# Patient Record
Sex: MALE | Race: WHITE | NOT HISPANIC OR LATINO | Employment: PART TIME | URBAN - METROPOLITAN AREA
[De-identification: names, ages, dates, MRNs, and addresses within clinical notes are randomized per-mention and may not be internally consistent; named-entity substitution may affect disease eponyms.]

---

## 2022-12-01 ENCOUNTER — HOSPITAL ENCOUNTER (INPATIENT)
Facility: HOSPITAL | Age: 77
LOS: 10 days | Discharge: HOME HEALTH | DRG: 236 | End: 2022-12-15
Attending: STUDENT IN AN ORGANIZED HEALTH CARE EDUCATION/TRAINING PROGRAM | Admitting: INTERNAL MEDICINE
Payer: MEDICARE

## 2022-12-01 DIAGNOSIS — R60.0 PERIPHERAL EDEMA: ICD-10-CM

## 2022-12-01 DIAGNOSIS — Z45.018: ICD-10-CM

## 2022-12-01 DIAGNOSIS — R07.9 CHEST PAIN: ICD-10-CM

## 2022-12-01 DIAGNOSIS — I25.10 CORONARY ARTERY DISEASE INVOLVING NATIVE CORONARY ARTERY OF NATIVE HEART WITHOUT ANGINA PECTORIS: ICD-10-CM

## 2022-12-01 DIAGNOSIS — I25.10 CORONARY ARTERY DISEASE, UNSPECIFIED VESSEL OR LESION TYPE, UNSPECIFIED WHETHER ANGINA PRESENT, UNSPECIFIED WHETHER NATIVE OR TRANSPLANTED HEART: ICD-10-CM

## 2022-12-01 DIAGNOSIS — I49.9 CARDIAC RHYTHM DISORDER OR DISTURBANCE OR CHANGE: ICD-10-CM

## 2022-12-01 DIAGNOSIS — I46.9 CARDIAC ARREST: ICD-10-CM

## 2022-12-01 DIAGNOSIS — I25.10 CAD (CORONARY ARTERY DISEASE): ICD-10-CM

## 2022-12-01 DIAGNOSIS — I49.9 DYSRHYTHMIA: ICD-10-CM

## 2022-12-01 DIAGNOSIS — I20.0 UNSTABLE ANGINA: Primary | ICD-10-CM

## 2022-12-01 LAB
ALBUMIN SERPL-MCNC: 4 GM/DL (ref 3.4–4.8)
ALBUMIN/GLOB SERPL: 1.6 RATIO (ref 1.1–2)
ALP SERPL-CCNC: 82 UNIT/L (ref 40–150)
ALT SERPL-CCNC: 19 UNIT/L (ref 0–55)
AST SERPL-CCNC: 16 UNIT/L (ref 5–34)
BASOPHILS # BLD AUTO: 0.08 X10(3)/MCL (ref 0–0.2)
BASOPHILS NFR BLD AUTO: 1 %
BILIRUBIN DIRECT+TOT PNL SERPL-MCNC: 0.6 MG/DL
BNP BLD-MCNC: 29.2 PG/ML
BUN SERPL-MCNC: 23.8 MG/DL (ref 8.4–25.7)
CALCIUM SERPL-MCNC: 8.6 MG/DL (ref 8.8–10)
CHLORIDE SERPL-SCNC: 102 MMOL/L (ref 98–107)
CO2 SERPL-SCNC: 27 MMOL/L (ref 23–31)
CREAT SERPL-MCNC: 1.22 MG/DL (ref 0.73–1.18)
EOSINOPHIL # BLD AUTO: 0.19 X10(3)/MCL (ref 0–0.9)
EOSINOPHIL NFR BLD AUTO: 2.4 %
ERYTHROCYTE [DISTWIDTH] IN BLOOD BY AUTOMATED COUNT: 12.5 % (ref 11.5–17)
GFR SERPLBLD CREATININE-BSD FMLA CKD-EPI: >60 MLS/MIN/1.73/M2
GLOBULIN SER-MCNC: 2.5 GM/DL (ref 2.4–3.5)
GLUCOSE SERPL-MCNC: 168 MG/DL (ref 82–115)
HCT VFR BLD AUTO: 41.7 % (ref 42–52)
HGB BLD-MCNC: 14 GM/DL (ref 14–18)
IMM GRANULOCYTES # BLD AUTO: 0.02 X10(3)/MCL (ref 0–0.04)
IMM GRANULOCYTES NFR BLD AUTO: 0.3 %
INR BLD: 1.09 (ref 0–1.3)
LYMPHOCYTES # BLD AUTO: 2.56 X10(3)/MCL (ref 0.6–4.6)
LYMPHOCYTES NFR BLD AUTO: 32 %
MCH RBC QN AUTO: 32.6 PG (ref 27–31)
MCHC RBC AUTO-ENTMCNC: 33.6 MG/DL (ref 33–36)
MCV RBC AUTO: 97.2 FL (ref 80–94)
MONOCYTES # BLD AUTO: 0.97 X10(3)/MCL (ref 0.1–1.3)
MONOCYTES NFR BLD AUTO: 12.1 %
NEUTROPHILS # BLD AUTO: 4.2 X10(3)/MCL (ref 2.1–9.2)
NEUTROPHILS NFR BLD AUTO: 52.2 %
NRBC BLD AUTO-RTO: 0 %
PLATELET # BLD AUTO: 161 X10(3)/MCL (ref 130–400)
PMV BLD AUTO: 9.1 FL (ref 7.4–10.4)
POTASSIUM SERPL-SCNC: 4.3 MMOL/L (ref 3.5–5.1)
PROT SERPL-MCNC: 6.5 GM/DL (ref 5.8–7.6)
PROTHROMBIN TIME: 14 SECONDS (ref 12.5–14.5)
RBC # BLD AUTO: 4.29 X10(6)/MCL (ref 4.7–6.1)
SODIUM SERPL-SCNC: 138 MMOL/L (ref 136–145)
TROPONIN I SERPL-MCNC: 0.04 NG/ML (ref 0–0.04)
WBC # SPEC AUTO: 8 X10(3)/MCL (ref 4.5–11.5)

## 2022-12-01 PROCEDURE — 93010 ELECTROCARDIOGRAM REPORT: CPT | Mod: ,,, | Performed by: STUDENT IN AN ORGANIZED HEALTH CARE EDUCATION/TRAINING PROGRAM

## 2022-12-01 PROCEDURE — 80053 COMPREHEN METABOLIC PANEL: CPT | Performed by: STUDENT IN AN ORGANIZED HEALTH CARE EDUCATION/TRAINING PROGRAM

## 2022-12-01 PROCEDURE — 63600175 PHARM REV CODE 636 W HCPCS: Performed by: STUDENT IN AN ORGANIZED HEALTH CARE EDUCATION/TRAINING PROGRAM

## 2022-12-01 PROCEDURE — 99285 EMERGENCY DEPT VISIT HI MDM: CPT | Mod: 25

## 2022-12-01 PROCEDURE — 93010 EKG 12-LEAD: ICD-10-PCS | Mod: ,,, | Performed by: STUDENT IN AN ORGANIZED HEALTH CARE EDUCATION/TRAINING PROGRAM

## 2022-12-01 PROCEDURE — 96372 THER/PROPH/DIAG INJ SC/IM: CPT | Performed by: STUDENT IN AN ORGANIZED HEALTH CARE EDUCATION/TRAINING PROGRAM

## 2022-12-01 PROCEDURE — 93005 ELECTROCARDIOGRAM TRACING: CPT

## 2022-12-01 PROCEDURE — 84484 ASSAY OF TROPONIN QUANT: CPT | Performed by: STUDENT IN AN ORGANIZED HEALTH CARE EDUCATION/TRAINING PROGRAM

## 2022-12-01 PROCEDURE — 85025 COMPLETE CBC W/AUTO DIFF WBC: CPT | Performed by: STUDENT IN AN ORGANIZED HEALTH CARE EDUCATION/TRAINING PROGRAM

## 2022-12-01 PROCEDURE — 85610 PROTHROMBIN TIME: CPT | Performed by: STUDENT IN AN ORGANIZED HEALTH CARE EDUCATION/TRAINING PROGRAM

## 2022-12-01 PROCEDURE — 83880 ASSAY OF NATRIURETIC PEPTIDE: CPT | Performed by: STUDENT IN AN ORGANIZED HEALTH CARE EDUCATION/TRAINING PROGRAM

## 2022-12-01 PROCEDURE — G0378 HOSPITAL OBSERVATION PER HR: HCPCS

## 2022-12-01 PROCEDURE — 25000003 PHARM REV CODE 250

## 2022-12-01 PROCEDURE — 25000003 PHARM REV CODE 250: Performed by: STUDENT IN AN ORGANIZED HEALTH CARE EDUCATION/TRAINING PROGRAM

## 2022-12-01 RX ORDER — TADALAFIL 5 MG/1
5 TABLET ORAL DAILY
Status: ON HOLD | COMMUNITY
Start: 2022-11-21 | End: 2022-12-15 | Stop reason: HOSPADM

## 2022-12-01 RX ORDER — SODIUM CHLORIDE 0.9 % (FLUSH) 0.9 %
10 SYRINGE (ML) INJECTION
Status: DISCONTINUED | OUTPATIENT
Start: 2022-12-02 | End: 2022-12-15 | Stop reason: HOSPADM

## 2022-12-01 RX ORDER — AMLODIPINE BESYLATE 5 MG/1
5 TABLET ORAL DAILY
Status: ON HOLD | COMMUNITY
Start: 2022-11-17 | End: 2022-12-15 | Stop reason: HOSPADM

## 2022-12-01 RX ORDER — TRAMADOL HYDROCHLORIDE 50 MG/1
50 TABLET ORAL 2 TIMES DAILY PRN
Status: ON HOLD | COMMUNITY
Start: 2022-11-14 | End: 2022-12-15 | Stop reason: HOSPADM

## 2022-12-01 RX ORDER — TRAZODONE HYDROCHLORIDE 100 MG/1
100 TABLET ORAL NIGHTLY
Status: ON HOLD | COMMUNITY
Start: 2022-09-25 | End: 2022-12-15 | Stop reason: HOSPADM

## 2022-12-01 RX ORDER — NAPROXEN SODIUM 220 MG/1
324 TABLET, FILM COATED ORAL
Status: COMPLETED | OUTPATIENT
Start: 2022-12-01 | End: 2022-12-01

## 2022-12-01 RX ORDER — TALC
6 POWDER (GRAM) TOPICAL NIGHTLY PRN
Status: DISCONTINUED | OUTPATIENT
Start: 2022-12-02 | End: 2022-12-15 | Stop reason: HOSPADM

## 2022-12-01 RX ORDER — ENOXAPARIN SODIUM 100 MG/ML
1 INJECTION SUBCUTANEOUS
Status: COMPLETED | OUTPATIENT
Start: 2022-12-01 | End: 2022-12-01

## 2022-12-01 RX ORDER — NAPROXEN SODIUM 220 MG/1
81 TABLET, FILM COATED ORAL DAILY
Status: DISCONTINUED | OUTPATIENT
Start: 2022-12-02 | End: 2022-12-05

## 2022-12-01 RX ORDER — PANTOPRAZOLE SODIUM 40 MG/1
40 TABLET, DELAYED RELEASE ORAL DAILY
Status: ON HOLD | COMMUNITY
Start: 2022-10-29 | End: 2022-12-15 | Stop reason: HOSPADM

## 2022-12-01 RX ADMIN — ASPIRIN 81 MG CHEWABLE TABLET 324 MG: 81 TABLET CHEWABLE at 10:12

## 2022-12-01 RX ADMIN — ENOXAPARIN SODIUM 80 MG: 80 INJECTION SUBCUTANEOUS at 11:12

## 2022-12-01 NOTE — Clinical Note
The catheter was repositioned into the MECHE. An angiography was performed of the Right internal mammary artery. The angiography was performed via power injection.

## 2022-12-01 NOTE — Clinical Note
The catheter was inserted into the ostium   left main. Hemodynamics were performed.  An angiography was performed of the left coronary arteries. Multiple views were taken. The angiography was performed via power injection.  Then removed.

## 2022-12-01 NOTE — Clinical Note
The catheter was repositioned into the LIMA. Hemodynamics were performed.  An angiography was performed of the Left internal mammary artery. The angiography was performed via power injection.  Then removed.

## 2022-12-01 NOTE — Clinical Note
Diagnosis: Unstable angina [[id number]]   Future Attending Provider: FABIAN GUPTA [[id number]]   Admitting Provider:: FABIAN GUPTA [[id number]]

## 2022-12-01 NOTE — Clinical Note
The wire was inserted into the aortaWire used for catheter insertion and all exchanges. Then removed..

## 2022-12-02 PROBLEM — I20.0 UNSTABLE ANGINA: Status: ACTIVE | Noted: 2022-12-02

## 2022-12-02 PROBLEM — R07.9 CHEST PAIN: Status: ACTIVE | Noted: 2022-12-02

## 2022-12-02 LAB
CATH EF QUANTITATIVE: 60 %
INR BLD: 0.99 (ref 0–1.3)
PROTHROMBIN TIME: 13 SECONDS (ref 12.5–14.5)
TROPONIN I SERPL-MCNC: 0.05 NG/ML (ref 0–0.04)
TROPONIN I SERPL-MCNC: 0.07 NG/ML (ref 0–0.04)

## 2022-12-02 PROCEDURE — 25500020 PHARM REV CODE 255: Performed by: INTERNAL MEDICINE

## 2022-12-02 PROCEDURE — 93010 EKG 12-LEAD: ICD-10-PCS | Mod: 76,,, | Performed by: STUDENT IN AN ORGANIZED HEALTH CARE EDUCATION/TRAINING PROGRAM

## 2022-12-02 PROCEDURE — 93005 ELECTROCARDIOGRAM TRACING: CPT

## 2022-12-02 PROCEDURE — 84484 ASSAY OF TROPONIN QUANT: CPT | Performed by: STUDENT IN AN ORGANIZED HEALTH CARE EDUCATION/TRAINING PROGRAM

## 2022-12-02 PROCEDURE — C1760 CLOSURE DEV, VASC: HCPCS | Performed by: INTERNAL MEDICINE

## 2022-12-02 PROCEDURE — 36415 COLL VENOUS BLD VENIPUNCTURE: CPT | Performed by: INTERNAL MEDICINE

## 2022-12-02 PROCEDURE — 99153 MOD SED SAME PHYS/QHP EA: CPT | Performed by: INTERNAL MEDICINE

## 2022-12-02 PROCEDURE — 99152 MOD SED SAME PHYS/QHP 5/>YRS: CPT | Performed by: INTERNAL MEDICINE

## 2022-12-02 PROCEDURE — G0378 HOSPITAL OBSERVATION PER HR: HCPCS

## 2022-12-02 PROCEDURE — 25000003 PHARM REV CODE 250: Performed by: INTERNAL MEDICINE

## 2022-12-02 PROCEDURE — C1769 GUIDE WIRE: HCPCS | Performed by: INTERNAL MEDICINE

## 2022-12-02 PROCEDURE — 25000003 PHARM REV CODE 250: Performed by: STUDENT IN AN ORGANIZED HEALTH CARE EDUCATION/TRAINING PROGRAM

## 2022-12-02 PROCEDURE — 93010 ELECTROCARDIOGRAM REPORT: CPT | Mod: 59,,, | Performed by: STUDENT IN AN ORGANIZED HEALTH CARE EDUCATION/TRAINING PROGRAM

## 2022-12-02 PROCEDURE — 93459 L HRT ART/GRFT ANGIO: CPT | Performed by: INTERNAL MEDICINE

## 2022-12-02 PROCEDURE — 25000242 PHARM REV CODE 250 ALT 637 W/ HCPCS: Performed by: INTERNAL MEDICINE

## 2022-12-02 PROCEDURE — 63600175 PHARM REV CODE 636 W HCPCS: Performed by: INTERNAL MEDICINE

## 2022-12-02 PROCEDURE — 85610 PROTHROMBIN TIME: CPT | Performed by: INTERNAL MEDICINE

## 2022-12-02 PROCEDURE — C1894 INTRO/SHEATH, NON-LASER: HCPCS | Performed by: INTERNAL MEDICINE

## 2022-12-02 PROCEDURE — 93010 ELECTROCARDIOGRAM REPORT: CPT | Mod: 76,,, | Performed by: STUDENT IN AN ORGANIZED HEALTH CARE EDUCATION/TRAINING PROGRAM

## 2022-12-02 DEVICE — ANGIO-SEAL VIP VASCULAR CLOSURE DEVICE
Type: IMPLANTABLE DEVICE | Site: GROIN | Status: FUNCTIONAL
Brand: ANGIO-SEAL

## 2022-12-02 RX ORDER — SODIUM CHLORIDE 9 MG/ML
500 INJECTION, SOLUTION INTRAVENOUS
Status: COMPLETED | OUTPATIENT
Start: 2022-12-02 | End: 2022-12-02

## 2022-12-02 RX ORDER — FENTANYL CITRATE 50 UG/ML
INJECTION, SOLUTION INTRAMUSCULAR; INTRAVENOUS
Status: DISCONTINUED | OUTPATIENT
Start: 2022-12-02 | End: 2022-12-05

## 2022-12-02 RX ORDER — NITROGLYCERIN 0.4 MG/1
0.4 TABLET SUBLINGUAL
Status: COMPLETED | OUTPATIENT
Start: 2022-12-02 | End: 2022-12-02

## 2022-12-02 RX ORDER — LIDOCAINE HYDROCHLORIDE 10 MG/ML
INJECTION INFILTRATION; PERINEURAL
Status: DISCONTINUED | OUTPATIENT
Start: 2022-12-02 | End: 2022-12-05

## 2022-12-02 RX ORDER — MIDAZOLAM HYDROCHLORIDE 1 MG/ML
INJECTION INTRAMUSCULAR; INTRAVENOUS
Status: DISCONTINUED | OUTPATIENT
Start: 2022-12-02 | End: 2022-12-05

## 2022-12-02 RX ORDER — SODIUM CHLORIDE 9 MG/ML
INJECTION, SOLUTION INTRAVENOUS CONTINUOUS
Status: ACTIVE | OUTPATIENT
Start: 2022-12-02 | End: 2022-12-03

## 2022-12-02 RX ORDER — LOSARTAN POTASSIUM 25 MG/1
25 TABLET ORAL DAILY
Status: DISCONTINUED | OUTPATIENT
Start: 2022-12-03 | End: 2022-12-15 | Stop reason: HOSPADM

## 2022-12-02 RX ORDER — ATORVASTATIN CALCIUM 40 MG/1
80 TABLET, FILM COATED ORAL NIGHTLY
Status: DISCONTINUED | OUTPATIENT
Start: 2022-12-02 | End: 2022-12-15 | Stop reason: HOSPADM

## 2022-12-02 RX ORDER — HEPARIN SODIUM,PORCINE/D5W 25000/250
20 INTRAVENOUS SOLUTION INTRAVENOUS CONTINUOUS
Status: DISCONTINUED | OUTPATIENT
Start: 2022-12-03 | End: 2022-12-05

## 2022-12-02 RX ORDER — METOPROLOL TARTRATE 25 MG/1
25 TABLET, FILM COATED ORAL 2 TIMES DAILY
Status: DISCONTINUED | OUTPATIENT
Start: 2022-12-02 | End: 2022-12-05

## 2022-12-02 RX ORDER — NITROGLYCERIN 0.4 MG/1
TABLET SUBLINGUAL
Status: DISCONTINUED | OUTPATIENT
Start: 2022-12-02 | End: 2022-12-15 | Stop reason: HOSPADM

## 2022-12-02 RX ADMIN — SODIUM CHLORIDE 500 ML: 9 INJECTION, SOLUTION INTRAVENOUS at 04:12

## 2022-12-02 RX ADMIN — ATORVASTATIN CALCIUM 80 MG: 40 TABLET, FILM COATED ORAL at 09:12

## 2022-12-02 RX ADMIN — METOPROLOL TARTRATE 25 MG: 25 TABLET, FILM COATED ORAL at 09:12

## 2022-12-02 RX ADMIN — SODIUM CHLORIDE: 9 INJECTION, SOLUTION INTRAVENOUS at 07:12

## 2022-12-02 RX ADMIN — NITROGLYCERIN 0.4 MG: 0.4 TABLET, ORALLY DISINTEGRATING SUBLINGUAL at 04:12

## 2022-12-02 RX ADMIN — ASPIRIN 81 MG CHEWABLE TABLET 81 MG: 81 TABLET CHEWABLE at 08:12

## 2022-12-02 NOTE — ED PROVIDER NOTES
Encounter Date: 12/1/2022    SCRIBE #1 NOTE: I, Melvin Mayer, am scribing for, and in the presence of,  Dr. Dale Singh MD. I have scribed the entire note.     History     Chief Complaint   Patient presents with    Chest Pain     Pt was seen [facility] yesterday for CP intial troponin was 52. 1 hour troponin 71. 3 hour troponin was 65.  Pt was sent home with a f/u for cardiology. Dr. Martinez wanted to perform an angio asap and needed to pt admitted to do so. Pt arrives POV states CP radiates down left arm.      77 year old male presents to the ED for chest pain. Pt states over the past 7 days he has experienced mild chest pressure that radiates down his left arm. Pt states he has also been feeling SOB upon exertion, but improves when resting. Pt was seen at [facility] yesterday and was told his Troponin was elevated. Pt was told to find a cardiologist. Dr. Martinez told the pt to come to the ED for admission so he can have an angiogram done in the morning. Pt denies any current pain or symptoms.     The history is provided by the patient. No  was used.   Chest Pain  The current episode started several days ago. Chest pain occurs intermittently. The chest pain is unchanged. The pain is associated with exertion. The quality of the pain is described as pressure-like. The pain radiates to the left arm. Primary symptoms include shortness of breath. Pertinent negatives for primary symptoms include no fever, no fatigue, no cough, no abdominal pain, no nausea, no vomiting and no dizziness.   Pertinent negatives for associated symptoms include no numbness and no weakness. He tried nothing for the symptoms. Risk factors include being elderly.   Review of patient's allergies indicates:   Allergen Reactions    Diphenhydramine      No past medical history on file.  Past Surgical History:   Procedure Laterality Date    ANGIOGRAM, CORONARY, WITH LEFT HEART CATHETERIZATION N/A 12/2/2022    Procedure: Angiogram, Coronary, with Left  Heart Cath;  Surgeon: Candido Martinez MD;  Location: [facility] CATH LAB;  Service: Cardiology;  Laterality: N/A;    CORONARY ARTERY BYPASS GRAFT (CABG) N/A 12/5/2022    Procedure: CORONARY ARTERY BYPASS GRAFT (CABG);  Surgeon: Vini Ricks MD;  Location: [facility] OR;  Service: Cardiothoracic;  Laterality: N/A;  Case #3    INSERTION OF PACEMAKER N/A 12/8/2022    Procedure: INSERTION, CARDIAC PACEMAKER;  Surgeon: Vini Ricks MD;  Location: [facility] OR;  Service: Cardiology;  Laterality: N/A;  INSERTION OF PERM PACEMAKER //    REP - PAT     No family history on file.     Review of Systems   Constitutional:  Negative for chills, fatigue and fever.   HENT:  Negative for congestion, ear discharge, ear pain, nosebleeds, rhinorrhea and sore throat.    Eyes:  Negative for pain, redness and visual disturbance.   Respiratory:  Positive for shortness of breath. Negative for cough and chest tightness.    Cardiovascular:  Positive for chest pain. Negative for leg swelling.   Gastrointestinal:  Negative for abdominal pain, blood in stool, constipation, diarrhea, nausea and vomiting.   Genitourinary:  Negative for dysuria and hematuria.   Musculoskeletal:  Negative for arthralgias, joint swelling, myalgias and neck pain.   Skin:  Negative for color change, pallor and wound.   Neurological:  Negative for dizziness, weakness, light-headedness, numbness and headaches.     Physical Exam     Initial Vitals [12/01/22 2126]   BP Pulse Resp Temp SpO2   130/74 76 16 96.1 °F (35.6 °C) 98 %      MAP       --         Physical Exam    Nursing note and vitals reviewed.  Constitutional: He appears well-developed and well-nourished. He is not diaphoretic. No distress.   HENT:   Head: Normocephalic and atraumatic.   Right Ear: External ear normal.   Left Ear: External ear normal.   Nose: Nose normal.   Mouth/Throat: Oropharynx is clear and moist.   Eyes: Conjunctivae and EOM are normal. Pupils are equal, round, and reactive to light. Right eye  exhibits no discharge. Left eye exhibits no discharge.   Neck: Neck supple. No tracheal deviation present.   Normal range of motion.  Cardiovascular:  Normal rate, regular rhythm, normal heart sounds and intact distal pulses.     Exam reveals no gallop and no friction rub.       No murmur heard.  Pulmonary/Chest: Breath sounds normal. No stridor. No respiratory distress. He has no wheezes. He has no rhonchi. He has no rales. He exhibits no tenderness.   Abdominal: Abdomen is soft. Bowel sounds are normal. He exhibits no distension and no mass. There is no abdominal tenderness. There is no guarding.   Musculoskeletal:         General: No tenderness or edema. Normal range of motion.      Cervical back: Normal range of motion and neck supple.     Neurological: He is alert and oriented to person, place, and time. No cranial nerve deficit or sensory deficit.   Skin: Skin is warm and dry. Capillary refill takes less than 2 seconds. No rash noted. No erythema. No pallor.       ED Course   Critical Care    Date/Time: 12/19/2022 4:00 PM  Performed by: Dale Cordoba MD  Authorized by: Dale Cordoba MD   Direct patient critical care time: 10 minutes  Additional history critical care time: 5 minutes  Ordering / reviewing critical care time: 6 minutes  Documentation critical care time: 5 minutes  Consulting other physicians critical care time: 5 minutes  Total critical care time (exclusive of procedural time) : 31 minutes  Critical care time was exclusive of separately billable procedures and treating other patients.  Critical care was necessary to treat or prevent imminent or life-threatening deterioration of the following conditions: cardiac failure.  Critical care was time spent personally by me on the following activities: development of treatment plan with patient or surrogate, discussions with consultants, discussions with primary provider, interpretation of cardiac output measurements, evaluation of patient's  response to treatment, examination of patient, obtaining history from patient or surrogate, ordering and performing treatments and interventions, ordering and review of laboratory studies, ordering and review of radiographic studies, pulse oximetry, re-evaluation of patient's condition and review of old charts.  Comments: Unstable angina requiring Lovenox, admission      Labs Reviewed   COMPREHENSIVE METABOLIC PANEL - Abnormal; Notable for the following components:       Result Value    Glucose Level 168 (*)     Creatinine 1.22 (*)     Calcium Level Total 8.6 (*)     All other components within normal limits   CBC WITH DIFFERENTIAL - Abnormal; Notable for the following components:    RBC 4.29 (*)     Hct 41.7 (*)     MCV 97.2 (*)     MCH 32.6 (*)     All other components within normal limits   TROPONIN I - Abnormal; Notable for the following components:    Troponin-I 0.051 (*)     All other components within normal limits   TROPONIN I - Abnormal; Notable for the following components:    Troponin-I 0.067 (*)     All other components within normal limits   B-TYPE NATRIURETIC PEPTIDE - Normal   TROPONIN I - Normal   PROTIME-INR - Normal   CBC W/ AUTO DIFFERENTIAL    Narrative:     The following orders were created for panel order CBC auto differential.  Procedure                               Abnormality         Status                     ---------                               -----------         ------                     CBC with Differential[[id number]]        Abnormal            Final result                 Please view results for these tests on the individual orders.     EKG Readings: (Independently Interpreted)   Initial Reading: No STEMI. Rhythm: Normal Sinus Rhythm. Heart Rate: 70. Ectopy: No Ectopy. Conduction: Normal. ST Segments: Normal ST Segments. T Waves Flipped: AVL. Axis: Left Axis Deviation.   Done on 12/1/22 at 2126.     QTC: 429 ms    High voltage QRS   ECG Results              EKG 12-lead (Final  result)  Result time 12/03/22 09:28:12      [facility] (12/03/22 09:28:12)                   Narrative:    Test Reason : R07.9,    Vent. Rate : 073 BPM     Atrial Rate : 073 BPM     P-R Int : 238 ms          QRS Dur : 106 ms      QT Int : 408 ms       P-R-T Axes : 050 -39 078 degrees     QTc Int : 449 ms    Sinus rhythm with 1st degree A-V block  Left axis deviation  Incomplete right bundle branch block  Minimal voltage criteria for LVH, may be normal variant ( R in aVL )  Septal infarct ,age undetermined  Abnormal ECG  Confirmed by Jay Mitchell MD ([id number]) on 12/3/2022 9:28:05 AM    Referred By: [email or web address]   SELF           Confirmed By:Jay Mitchell MD                                     EKG 12-lead (Final result)  Result time 12/03/22 09:31:28      Final result by Interface, Lab In [facility] (12/03/22 09:31:28)                   Narrative:    Test Reason : R07.9,    Vent. Rate : 070 BPM     Atrial Rate : 070 BPM     P-R Int : 202 ms          QRS Dur : 104 ms      QT Int : 398 ms       P-R-T Axes : 090 -40 068 degrees     QTc Int : 429 ms    Normal sinus rhythm  Left axis deviation  Minimal voltage criteria for LVH, may be normal variant ( R in aVL )  Abnormal ECG  No previous ECGs available  Confirmed by Jay Mitchell MD ([id number]) on 12/3/2022 9:31:15 AM    Referred By:             Confirmed By:Jay Mitchell MD                                  Imaging Results              X-Ray Chest PA And Lateral (Final result)  Result time 12/02/22 06:49:11      Final result by Jong Salazar MD (12/02/22 06:49:11)                   Impression:      No acute cardiopulmonary process.      Electronically signed by: Jong Salazar  Date:    12/02/2022  Time:    06:49               Narrative:    EXAMINATION:  XR CHEST PA AND LATERAL    CLINICAL HISTORY:  Chest Pain;    TECHNIQUE:  Two views of the chest    COMPARISON:  08/18/2021    FINDINGS:  No focal opacification, pleural effusion, or  pneumothorax.    The cardiomediastinal silhouette is within normal limits.    No acute osseous abnormality.                                       Medications   0.9%  NaCl infusion ( Intravenous New Bag 12/2/22 1915)   cefUROXime sodium 1.5 gram SolR (has no administration in time range)   dextrose 5 % in water (D5W) 5 % IVPB (has no administration in time range)   cefazolin (ANCEF) 2 gram in dextrose 5% 50 mL IVPB (premix) (2 g Intravenous New Bag 12/6/22 0611)   mupirocin 2 % ointment ( Nasal Given 12/9/22 2030)   electrolyte-A infusion (has no administration in time range)   ondansetron 4 mg/2 mL injection (has no administration in time range)   ondansetron 4 mg/2 mL injection (  Back Association 12/6/22 1000)   heparin, porcine (PF) (heparin flush 100 units/mL) 100 unit/mL injection flush (has no administration in time range)   LIDOcaine HCL 10 mg/ml (1%) 10 mg/mL (1 %) injection (has no administration in time range)   amiodarone 360 mg/200 mL (1.8 mg/mL) infusion (1 mg/min Intravenous New Bag 12/10/22 0603)   amiodarone tablet 400 mg (400 mg Oral Given 12/12/22 2131)   aspirin chewable tablet 324 mg (324 mg Oral Given 12/1/22 2206)   enoxaparin injection 80 mg (80 mg Subcutaneous Given 12/1/22 2342)   0.9%  NaCl infusion (0 mLs Intravenous Stopped 12/2/22 0406)   nitroGLYCERIN SL tablet 0.4 mg (0.4 mg Sublingual Given 12/2/22 0404)   heparin 25,000 units in dextrose 5% (100 units/ml) IV bolus from bag INITIAL BOLUS (max bolus 4000 units) (4,000 Units Intravenous Bolus from Bag 12/3/22 0010)   ketorolac injection 15 mg (15 mg Intravenous Given 12/8/22 1140)   acetaminophen 1,000 mg/100 mL (10 mg/mL) injection 1,000 mg (1,000 mg Intravenous New Bag 12/5/22 1615)   morphine 4 mg/mL injection (4 mg  Given 12/5/22 1740)   acetaminophen 1,000 mg/100 mL (10 mg/mL) injection 1,000 mg (0 mg Intravenous Stopped 12/8/22 9671)   amiodarone in dextrose 150 mg/100 mL (1.5 mg/mL) loading dose 150 mg (0 mg Intravenous Stopped  12/10/22 0228)   amiodarone tablet 200 mg (200 mg Oral Given 12/15/22 0802)   furosemide injection 20 mg (20 mg Intravenous Given 12/12/22 1343)     Medical Decision Making:   Initial Assessment:   Patient with chest pain, worse with exertion told by Cardiology to come to the ER for further evaluation  Differential Diagnosis:   CAD, angina, unstable angina, NSTEMI  Clinical Tests:   Lab Tests: Reviewed and Ordered  Radiological Study: Reviewed and Ordered  Medical Tests: Reviewed and Ordered  ED Management:  Patient awake alert oriented.  Chest pain-free at this time.  Unfortunately he is a very good history for some unstable angina, chest pain radiating down his left arm.  Associated with shortness of breath.  Discussed with DANNI Madrigal for [facility], asked that we admit patient give therapeutic Lovenox, will admit for further evaluation the a.m..  NPO at midnight.  Patient's labs largely unremarkable, EKG without any acute ischemic changes.  We will admit patient to cardiology for further evaluation management.    Dispo:  Admit      Data Reviewed/Counseling: I have personally reviewed the patient's vital signs, nursing notes, and other relevant tests, information, and imaging. I had a detailed discussion regarding the historical points, exam findings, and any diagnostic results supporting the discharge diagnosis.      Portions of this note were dictated using voice recognition software. Although it was reviewed for accuracy, some inherent voice recognition errors may have occurred and be present in this document.             Scribe Attestation:   Scribe #1: I performed the above scribed service and the documentation accurately describes the services I performed. I attest to the accuracy of the note.    Attending Attestation:           Physician Attestation for Scribe:  Physician Attestation Statement for Scribe #1: I, Dr. Dale Singh MD, reviewed documentation, as scribed by Melvin Mayer in my presence, and it is  both accurate and complete.                        Clinical Impression:   Final diagnoses:  [R07.9] Chest pain  [I20.0] Unstable angina (Primary)          ED Disposition Condition    Observation Stable                Dale Cordoba MD  12/02/22 0059       Dale Cordoba MD  12/19/22 4457

## 2022-12-02 NOTE — H&P
Patient name: Suresh Aragon  MRN: [id number]  : 1945  Cath Lab Procedure H&P Update    Pre-Procedure Assessment:    I saw and examined the patient face to face. The patient has been re-evaluated and his condition is unchanged. The reason for admission, procedure and care is still present.  Based on the patients H&P, pre-procedure physical exam, relevant diagnostic studies, NPO status and information obtained from the patient, I determine the patient is an appropriate candidate for the proposed procedure and anesthesia planned. I further certify the anesthesia risks, benefits and options have been explained to the patient to which he agrees as documented on the procedural consent.    See scanned updated H&P and additional records from media tab.      Candido Martinez

## 2022-12-02 NOTE — H&P
[facility]  Cardiology  History and Physical     Patient Name: Suresh Aragon  MRN: [id number]  Admission Date: 12/1/2022  Code Status: Full Code   Attending Provider: Yair Benson MD   Primary Care Physician: No primary care provider on file.  Principal Problem:<principal problem not specified>    Patient information was obtained from patient, past medical records, and ER records.     Subjective:     Chief Complaint:  chest pain     HPI:   This is a 77-year-old male, who is known to Dr. Martinez, with a history of HTN, HLD, GERD, angina.  Patient presents to the ER on 12/01/2022 with complaints of chest pain.  Of note patient was just seen in clinic by Dr. Martinez where he was advised to present to ER if had chest pain secondary to unstable angina prior to his scheduled outpatient LHC.  Patient stated the pain started approximately 7 days prior.  He stated the pain feels like a pressure and radiates down to his left arm.  He said the pain is worse with exertion and improves with rest.  He also had associated shortness of breath with exertion.  Initial troponin was negative however repeat troponins are now mildly elevated.  Initial EKG revealed normal sinus rhythm with a left axis deviation and LVH.  [facility] has admitted the patient for unstable angina.      PMH: HTN, HLD, GERD, angina, Prostate cancer  PSH:   appendectomy, prostate surgery  Social History:  Denies EtOH, tobacco, and illicit drug use  Family History:  Father-CAD, MI, HTN    Previous Cardiac Diagnostics:   No previous cardiac diagnostics available for review          Review of patient's allergies indicates:   Allergen Reactions    Diphenhydramine        No current facility-administered medications on file prior to encounter.     Current Outpatient Medications on File Prior to Encounter   Medication Sig    amLODIPine (NORVASC) 5 MG tablet Take 5 mg by mouth once daily.    pantoprazole (PROTONIX) 40 MG tablet Take 40 mg by mouth once  daily.    tadalafiL (CIALIS) 5 MG tablet Take 5 mg by mouth once daily.    traMADoL (ULTRAM) 50 mg tablet Take 50 mg by mouth 2 (two) times daily as needed.    traZODone (DESYREL) 100 MG tablet Take 100 mg by mouth every evening.         Review of Systems   Constitutional: Negative.   HENT: Negative.     Eyes: Negative.    Cardiovascular:  Positive for chest pain.   Respiratory: Negative.     Endocrine: Negative.    Hematologic/Lymphatic: Negative.    Skin: Negative.    Musculoskeletal: Negative.    Gastrointestinal: Negative.    Genitourinary: Negative.    Neurological: Negative.    Psychiatric/Behavioral: Negative.     Allergic/Immunologic: Negative.    Objective:     Vital Signs (Most Recent):  Temp: 96.1 °F (35.6 °C) (12/01/22 2126)  Pulse: 72 (12/02/22 0721)  Resp: 18 (12/02/22 0721)  BP: (!) 135/94 (12/02/22 0701)  SpO2: 98 % (12/02/22 0721)   Vital Signs (24h Range):  Temp:  [96.1 °F (35.6 °C)] 96.1 °F (35.6 °C)  Pulse:  [63-77] 72  Resp:  [13-22] 18  SpO2:  [95 %-98 %] 98 %  BP: (110-156)/(63-94) 135/94     Weight: 78 kg (172 lb)  Body mass index is 24.68 kg/m².    SpO2: 98 %  O2 Device (Oxygen Therapy): room air    No intake or output data in the 24 hours ending 12/02/22 0823    Lines/Drains/Airways       Peripheral Intravenous Line  Duration                  Peripheral IV - Single Lumen 12/01/22 2215 20 G Left Antecubital <1 day                    Physical Exam  Constitutional:       Appearance: Normal appearance.   HENT:      Head: Normocephalic.   Eyes:      Extraocular Movements: Extraocular movements intact.      Conjunctiva/sclera: Conjunctivae normal.   Cardiovascular:      Rate and Rhythm: Normal rate and regular rhythm.      Pulses: Normal pulses.      Heart sounds: Normal heart sounds.   Pulmonary:      Effort: Pulmonary effort is normal.      Breath sounds: Normal breath sounds.   Abdominal:      General: Abdomen is flat.   Musculoskeletal:         General: Normal range of motion.      Cervical  back: Normal range of motion and neck supple.   Skin:     General: Skin is warm and dry.   Neurological:      Mental Status: He is alert and oriented to person, place, and time.       Significant Labs: CMP   Recent Labs   Lab 12/01/22 2203      K 4.3   CO2 27   BUN 23.8   CREATININE 1.22*   CALCIUM 8.6*   ALBUMIN 4.0   BILITOT 0.6   ALKPHOS 82   AST 16   ALT 19   , CBC   Recent Labs   Lab 12/01/22 2203   WBC 8.0   HGB 14.0   HCT 41.7*      , and Troponin   Recent Labs   Lab 12/01/22 2203 12/02/22  0004 12/02/22  0529   TROPONINI 0.045 0.051* 0.067*       Significant Imaging:  X-Ray: CXR: X-Ray Chest PA and Lateral (CXR):   Results for orders placed or performed during the hospital encounter of 12/01/22   X-Ray Chest PA And Lateral    Narrative    EXAMINATION:  XR CHEST PA AND LATERAL    CLINICAL HISTORY:  Chest Pain;    TECHNIQUE:  Two views of the chest    COMPARISON:  08/18/2021    FINDINGS:  No focal opacification, pleural effusion, or pneumothorax.    The cardiomediastinal silhouette is within normal limits.    No acute osseous abnormality.      Impression    No acute cardiopulmonary process.      Electronically signed by: Jong Salazar  Date:    12/02/2022  Time:    06:49     EKG:       Assessment and Plan:     Impression:  Unstable angina  HTN  HLD  GERD    Plan:  Keep NPO  Continue to trend CE  [facility] today  Continue home medications  Risk, Benefits and Alternatives Reviewed and Discussed with the PT and their Family and they wish to proceed with above Procedure.        NIKOLE Cervantes-BC  Cardiology  [facility] - Emergency Dept  12/02/2022 8:23 AM

## 2022-12-02 NOTE — PROGRESS NOTES
76 yo male known to [facility], seen by Dr. Martinez today as a new patient, with history of USA and scheduled for OP C. He was told to go to ER if CP worsened. Tonight he c/o CP described as pressure radiating down left arm and SOB/WONG which improves with rest. Trop 0.045, no acute EKG changes. Will admit to observation, trend troponins, monitor telemetry, give one dose of wt. Based lovenox, ASA and keep NPO after MN. Further evaluation in the morning.

## 2022-12-03 LAB
APTT PPP: 100.1 SECONDS (ref 23.2–33.7)
APTT PPP: 76.9 SECONDS (ref 23.2–33.7)
APTT PPP: >200 SECONDS (ref 23.2–33.7)
BASOPHILS # BLD AUTO: 0.07 X10(3)/MCL (ref 0–0.2)
BASOPHILS NFR BLD AUTO: 0.8 %
EOSINOPHIL # BLD AUTO: 0.09 X10(3)/MCL (ref 0–0.9)
EOSINOPHIL NFR BLD AUTO: 1 %
ERYTHROCYTE [DISTWIDTH] IN BLOOD BY AUTOMATED COUNT: 12.6 % (ref 11.5–17)
HCT VFR BLD AUTO: 39.6 % (ref 42–52)
HGB BLD-MCNC: 13.3 GM/DL (ref 14–18)
IMM GRANULOCYTES # BLD AUTO: 0.02 X10(3)/MCL (ref 0–0.04)
IMM GRANULOCYTES NFR BLD AUTO: 0.2 %
LYMPHOCYTES # BLD AUTO: 2.35 X10(3)/MCL (ref 0.6–4.6)
LYMPHOCYTES NFR BLD AUTO: 27.3 %
MCH RBC QN AUTO: 32.4 PG (ref 27–31)
MCHC RBC AUTO-ENTMCNC: 33.6 MG/DL (ref 33–36)
MCV RBC AUTO: 96.6 FL (ref 80–94)
MONOCYTES # BLD AUTO: 1.02 X10(3)/MCL (ref 0.1–1.3)
MONOCYTES NFR BLD AUTO: 11.9 %
NEUTROPHILS # BLD AUTO: 5.1 X10(3)/MCL (ref 2.1–9.2)
NEUTROPHILS NFR BLD AUTO: 58.8 %
NRBC BLD AUTO-RTO: 0 %
PLATELET # BLD AUTO: 168 X10(3)/MCL (ref 130–400)
PMV BLD AUTO: 9.7 FL (ref 7.4–10.4)
RBC # BLD AUTO: 4.1 X10(6)/MCL (ref 4.7–6.1)
WBC # SPEC AUTO: 8.6 X10(3)/MCL (ref 4.5–11.5)

## 2022-12-03 PROCEDURE — 63600175 PHARM REV CODE 636 W HCPCS: Performed by: INTERNAL MEDICINE

## 2022-12-03 PROCEDURE — 36415 COLL VENOUS BLD VENIPUNCTURE: CPT | Performed by: INTERNAL MEDICINE

## 2022-12-03 PROCEDURE — 93010 EKG 12-LEAD: ICD-10-PCS | Mod: ,,, | Performed by: INTERNAL MEDICINE

## 2022-12-03 PROCEDURE — 93005 ELECTROCARDIOGRAM TRACING: CPT

## 2022-12-03 PROCEDURE — G0378 HOSPITAL OBSERVATION PER HR: HCPCS

## 2022-12-03 PROCEDURE — 93010 ELECTROCARDIOGRAM REPORT: CPT | Mod: ,,, | Performed by: INTERNAL MEDICINE

## 2022-12-03 PROCEDURE — 25000003 PHARM REV CODE 250: Performed by: STUDENT IN AN ORGANIZED HEALTH CARE EDUCATION/TRAINING PROGRAM

## 2022-12-03 PROCEDURE — 25000003 PHARM REV CODE 250: Performed by: INTERNAL MEDICINE

## 2022-12-03 PROCEDURE — 85730 THROMBOPLASTIN TIME PARTIAL: CPT | Performed by: INTERNAL MEDICINE

## 2022-12-03 PROCEDURE — 85025 COMPLETE CBC W/AUTO DIFF WBC: CPT | Performed by: INTERNAL MEDICINE

## 2022-12-03 RX ADMIN — METOPROLOL TARTRATE 25 MG: 25 TABLET, FILM COATED ORAL at 09:12

## 2022-12-03 RX ADMIN — LOSARTAN POTASSIUM 25 MG: 25 TABLET, FILM COATED ORAL at 09:12

## 2022-12-03 RX ADMIN — NITROGLYCERIN 2 INCH: 20 OINTMENT TOPICAL at 09:12

## 2022-12-03 RX ADMIN — ATORVASTATIN CALCIUM 80 MG: 40 TABLET, FILM COATED ORAL at 09:12

## 2022-12-03 RX ADMIN — NITROGLYCERIN 2 INCH: 20 OINTMENT TOPICAL at 12:12

## 2022-12-03 RX ADMIN — HEPARIN SODIUM 16 UNITS/KG/HR: 10000 INJECTION, SOLUTION INTRAVENOUS at 04:12

## 2022-12-03 RX ADMIN — ASPIRIN 81 MG CHEWABLE TABLET 81 MG: 81 TABLET CHEWABLE at 09:12

## 2022-12-03 RX ADMIN — HEPARIN SODIUM 20 UNITS/KG/HR: 10000 INJECTION, SOLUTION INTRAVENOUS at 12:12

## 2022-12-03 NOTE — PROGRESS NOTES
[facility] - Observation Unit  Cardiology  Progress Note    Patient Name: Suersh Aragon  MRN: [id number]  Admission Date: 12/1/2022  Hospital Length of Stay: 0 days  Code Status: Full Code   Attending Physician: Yair Benson MD   Primary Care Physician: No primary care provider on file.  Expected Discharge Date:   Principal Problem:Unstable angina    Subjective:     Brief HPI:   This is a 77-year-old male, who is known to Dr. Martinez, with a history of HTN, HLD, GERD, angina.  Patient presents to the ER on 12/01/2022 with complaints of chest pain.  Of note patient was just seen in clinic by Dr. Martinez where he was advised to present to ER if had chest pain secondary to unstable angina prior to his scheduled outpatient LHC.  Patient stated the pain started approximately 7 days prior.  He stated the pain feels like a pressure and radiates down to his left arm.  He said the pain is worse with exertion and improves with rest.  He also had associated shortness of breath with exertion.  Initial troponin was negative however repeat troponins are now mildly elevated.  Initial EKG revealed normal sinus rhythm with a left axis deviation and LVH.  [facility] has admitted the patient for unstable angina.     Hospital Course:   12.3.22: NAD noted. VSS. SR on tele. Denies CP/SOB/Palps.  Right groin benign.    PMH: HTN, HLD, GERD, angina, Prostate cancer  PSH:   appendectomy, prostate surgery  Social History:  Denies EtOH, tobacco, and illicit drug use  Family History:  Father-CAD, MI, HTN     Previous Cardiac Diagnostics:   [facility] 12.2.22  1. LEFT MAIN:  MILD CALCIFICATION BUT NO OBSTRUCTIVE DISEASE.  2. LAD:  THE LAD SHOWS HEAVY CALCIFICATION PROXIMALLY.  THERE IS EVIDENCE OF A 95% STENOSIS PROXIMALLY INVOLVING THE 1ST DIAGONAL.  THE 1ST DIAGONAL SHOWS 95% STENOSIS PROXIMALLY.  THE MID LAD SHOWS 90% STENOSIS.  3. LEFT CIRCUMFLEX:  90% PROXIMAL STENOSIS IS NOTED  4. RIGHT CORONARY ARTERY:  THE RIGHT CORONARY ARTERY SHOWS A LONG,  VERY DISTAL 90% LESION WHICH COMPROMISES THE FLOW INTO THE PDA     GRAFT ANGIOGRAPHY:  1. LIMA TO THE LEFT SIDE OF THE CHEST WALL: WIDELY PATENT WITH NO DISEASE  2.  MECHE TO THE RIGHT SIDE OF THE CHEST WALL: WIDELY PATENT WITH NO DISEASE          Review of Systems   Constitutional: Negative for chills and fever.   Cardiovascular:  Negative for chest pain and palpitations.   Respiratory:  Negative for shortness of breath.    Psychiatric/Behavioral:  Negative for altered mental status.          Objective:     Vital Signs (Most Recent):  Temp: 97.6 °F (36.4 °C) (12/03/22 0730)  Pulse: 72 (12/03/22 0730)  Resp: 18 (12/03/22 0730)  BP: 128/67 (12/03/22 0730)  SpO2: 96 % (12/03/22 0730) Vital Signs (24h Range):  Temp:  [96.1 °F (35.6 °C)-98.4 °F (36.9 °C)] 97.6 °F (36.4 °C)  Pulse:  [] 72  Resp:  [15-18] 18  SpO2:  [95 %-100 %] 96 %  BP: (128-171)/(67-87) 128/67     Weight: 78 kg (171 lb 15.3 oz)  Body mass index is 24.67 kg/m².    SpO2: 96 %  O2 Device (Oxygen Therapy): nasal cannula      Intake/Output Summary (Last 24 hours) at 12/3/2022 0823  Last data filed at 12/3/2022 0445  Gross per 24 hour   Intake 240 ml   Output 1500 ml   Net -1260 ml       Lines/Drains/Airways       Peripheral Intravenous Line  Duration                  Peripheral IV - Single Lumen 12/01/22 2215 20 G Left Antecubital 1 day                    Significant Labs: CMP   Recent Labs   Lab 12/01/22 2203      K 4.3   CO2 27   BUN 23.8   CREATININE 1.22*   CALCIUM 8.6*   ALBUMIN 4.0   BILITOT 0.6   ALKPHOS 82   AST 16   ALT 19   , CBC   Recent Labs   Lab 12/01/22 2203 12/03/22  0556   WBC 8.0 8.6   HGB 14.0 13.3*   HCT 41.7* 39.6*    168   , and Troponin   Recent Labs   Lab 12/01/22 2203 12/02/22  0004 12/02/22  0529   TROPONINI 0.045 0.051* 0.067*       Telemetry:  SR    Physical Exam:  Physical Exam  Constitutional:       Appearance: Normal appearance.   HENT:      Head: Normocephalic.   Eyes:      Extraocular Movements:  Extraocular movements intact.      Conjunctiva/sclera: Conjunctivae normal.   Cardiovascular:      Rate and Rhythm: Normal rate and regular rhythm.      Pulses: Normal pulses.      Heart sounds: Normal heart sounds.      Comments: R Groin Soft/Flat, Non-Tender, No Sign of Bleed/Infection. +2 BLE Palpable Pedal Pulses    Pulmonary:      Effort: Pulmonary effort is normal.      Breath sounds: Normal breath sounds.   Abdominal:      Palpations: Abdomen is soft.   Musculoskeletal:         General: Normal range of motion.      Cervical back: Neck supple.   Skin:     General: Skin is warm and dry.   Neurological:      Mental Status: He is alert and oriented to person, place, and time. Mental status is at baseline.   Psychiatric:         Mood and Affect: Mood normal.         Behavior: Behavior normal.       Current Inpatient Medications:    Current Facility-Administered Medications:     aspirin chewable tablet 81 mg, 81 mg, Oral, Daily, Dale Cordoba MD, 81 mg at 12/02/22 0807    atorvastatin tablet 80 mg, 80 mg, Oral, QHS, Candido Martinez MD, 80 mg at 12/02/22 2145    fentaNYL injection, , , PRN, Candido Martinez MD, 50 mcg at 12/02/22 1724    heparin 25,000 units in dextrose 5% (100 units/ml) IV bolus from bag - ADDITIONAL PRN BOLUS - 30 units/kg (max bolus 4000 units), 30 Units/kg (Adjusted), Intravenous, PRN, Candido Martinez MD    heparin 25,000 units in dextrose 5% (100 units/ml) IV bolus from bag - ADDITIONAL PRN BOLUS - 60 units/kg (max bolus 4000 units), 53.3 Units/kg (Adjusted), Intravenous, PRCandido CROSS MD    heparin 25,000 units in dextrose 5% 250 mL (100 units/mL) infusion LOW INTENSITY nomogram - LAF, 20 Units/kg/hr (Adjusted), Intravenous, Continuous, Candido Martinez MD, Last Rate: 15 mL/hr at 12/03/22 0018, 20 Units/kg/hr at 12/03/22 0018    iopamidoL (ISOVUE-370) injection, , , PRN, Candido Martinez MD, 100 mL at 12/02/22 1747    LIDOcaine HCL 10 mg/ml (1%) injection, , , PRNCandido  MD Juan, 10 mL at 12/02/22 1725    losartan tablet 25 mg, 25 mg, Oral, Daily, Candido Martinez MD    melatonin tablet 6 mg, 6 mg, Oral, Nightly PRN, Dale Cordoba MD    metoprolol tartrate (LOPRESSOR) tablet 25 mg, 25 mg, Oral, BID, Candido Martinez MD, 25 mg at 12/02/22 2145    midazolam (VERSED) 1 mg/mL injection, , , PRN, Candido Martinez MD, 1 mg at 12/02/22 1724    nitroGLYCERIN 2% TD oint ointment 2 inch, 2 inch, Topical (Top), Q6H, Candido Martinez MD, 2 inch at 12/03/22 0000    nitroGLYCERIN SL tablet, , , PRN, Candido Martinez MD, 0.1 mg at 12/02/22 1754    sodium chloride 0.9% flush 10 mL, 10 mL, Intravenous, PRN, Dale Cordoba MD        Assessment:     IMPRESSION:  NSTEMI type I  MV CAD  Unstable angina  HTN  HLD  GERD  No Hx of GI Bleed      Plan:     PLAN:  Continue ASA, high intensity statin, ARB, BB  Echo   Carotid US  CT Sx for CABG Eval pending  Right groin precautions/activity restrictions        JORDAN CervantesHospital for Special Care  Cardiology  [facility]  12/03/2022

## 2022-12-03 NOTE — NURSING
aPTT >200, verified with two separate lab results. Heparin infusion stopped per nomogram. aPTT ordered for 11:00am. RT with [facility] notified per phone, instructed to follow protocol.   aPTT result at 76.9. Heparin infusion restarted at 12:48 at 16u/kg/hr per ordered nomogram.

## 2022-12-04 LAB
ABORH RETYPE: NORMAL
ALBUMIN SERPL-MCNC: 3.7 GM/DL (ref 3.4–4.8)
ALBUMIN/GLOB SERPL: 1.7 RATIO (ref 1.1–2)
ALP SERPL-CCNC: 63 UNIT/L (ref 40–150)
ALT SERPL-CCNC: 14 UNIT/L (ref 0–55)
APTT PPP: 142.1 SECONDS (ref 23.2–33.7)
APTT PPP: 78.4 SECONDS (ref 23.2–33.7)
APTT PPP: 97.9 SECONDS (ref 23.2–33.7)
AST SERPL-CCNC: 17 UNIT/L (ref 5–34)
AV INDEX (PROSTH): 0.72
AV MEAN GRADIENT: 2 MMHG
AV PEAK GRADIENT: 4 MMHG
AV VALVE AREA: 2.27 CM2
AV VELOCITY RATIO: 0.74
BASOPHILS # BLD AUTO: 0.06 X10(3)/MCL (ref 0–0.2)
BASOPHILS NFR BLD AUTO: 0.6 %
BILIRUBIN DIRECT+TOT PNL SERPL-MCNC: 1.3 MG/DL
BSA FOR ECHO PROCEDURE: 1.96 M2
BUN SERPL-MCNC: 11.8 MG/DL (ref 8.4–25.7)
CALCIUM SERPL-MCNC: 8.4 MG/DL (ref 8.8–10)
CHLORIDE SERPL-SCNC: 108 MMOL/L (ref 98–107)
CO2 SERPL-SCNC: 22 MMOL/L (ref 23–31)
CREAT SERPL-MCNC: 0.84 MG/DL (ref 0.73–1.18)
DOP CALC AO PEAK VEL: 1 M/S
DOP CALC AO VTI: 20.9 CM
DOP CALC LVOT AREA: 3.1 CM2
DOP CALC LVOT DIAMETER: 2 CM
DOP CALC LVOT PEAK VEL: 0.74 M/S
DOP CALC LVOT STROKE VOLUME: 47.41 CM3
DOP CALC MV VTI: 24.9 CM
DOP CALCLVOT PEAK VEL VTI: 15.1 CM
E WAVE DECELERATION TIME: 224 MSEC
E/A RATIO: 0.78
E/E' RATIO: 5.06 M/S
EJECTION FRACTION: 50 %
EOSINOPHIL # BLD AUTO: 0.06 X10(3)/MCL (ref 0–0.9)
EOSINOPHIL NFR BLD AUTO: 0.6 %
ERYTHROCYTE [DISTWIDTH] IN BLOOD BY AUTOMATED COUNT: 12.5 % (ref 11.5–17)
GFR SERPLBLD CREATININE-BSD FMLA CKD-EPI: >60 MLS/MIN/1.73/M2
GLOBULIN SER-MCNC: 2.2 GM/DL (ref 2.4–3.5)
GLUCOSE SERPL-MCNC: 112 MG/DL (ref 82–115)
GROUP & RH: NORMAL
HCT VFR BLD AUTO: 39 % (ref 42–52)
HGB BLD-MCNC: 13.2 GM/DL (ref 14–18)
IMM GRANULOCYTES # BLD AUTO: 0.02 X10(3)/MCL (ref 0–0.04)
IMM GRANULOCYTES NFR BLD AUTO: 0.2 %
INDIRECT COOMBS GEL: NORMAL
LEFT ATRIUM SIZE: 2.8 CM
LEFT CCA DIST DIAS: 15 CM/S
LEFT CCA DIST SYS: 86 CM/S
LEFT CCA PROX DIAS: 16 CM/S
LEFT CCA PROX SYS: 88 CM/S
LEFT ECA DIAS: 7 CM/S
LEFT ECA SYS: 102 CM/S
LEFT ICA DIST DIAS: 21 CM/S
LEFT ICA DIST SYS: 81 CM/S
LEFT ICA MID DIAS: 18 CM/S
LEFT ICA MID SYS: 82 CM/S
LEFT ICA PROX DIAS: 14 CM/S
LEFT ICA PROX SYS: 57 CM/S
LEFT VENTRICLE DIASTOLIC VOLUME INDEX: 33.67 ML/M2
LEFT VENTRICLE DIASTOLIC VOLUME: 66 ML
LEFT VENTRICLE SYSTOLIC VOLUME INDEX: 15.8 ML/M2
LEFT VENTRICLE SYSTOLIC VOLUME: 31 ML
LEFT VERTEBRAL SYS: 45 CM/S
LV LATERAL E/E' RATIO: 4.3 M/S
LV SEPTAL E/E' RATIO: 6.14 M/S
LVOT MG: 1 MMHG
LVOT MV: 0.48 CM/S
LYMPHOCYTES # BLD AUTO: 2.93 X10(3)/MCL (ref 0.6–4.6)
LYMPHOCYTES NFR BLD AUTO: 27.2 %
MCH RBC QN AUTO: 32.2 PG (ref 27–31)
MCHC RBC AUTO-ENTMCNC: 33.8 MG/DL (ref 33–36)
MCV RBC AUTO: 95.1 FL (ref 80–94)
MONOCYTES # BLD AUTO: 1.42 X10(3)/MCL (ref 0.1–1.3)
MONOCYTES NFR BLD AUTO: 13.2 %
MV MEAN GRADIENT: 1 MMHG
MV PEAK A VEL: 0.55 M/S
MV PEAK E VEL: 0.43 M/S
MV PEAK GRADIENT: 2 MMHG
MV STENOSIS PRESSURE HALF TIME: 56 MS
MV VALVE AREA BY CONTINUITY EQUATION: 1.9 CM2
MV VALVE AREA P 1/2 METHOD: 3.93 CM2
NEUTROPHILS # BLD AUTO: 6.3 X10(3)/MCL (ref 2.1–9.2)
NEUTROPHILS NFR BLD AUTO: 58.2 %
NRBC BLD AUTO-RTO: 0 %
OHS CV CAROTID RIGHT ICA EDV HIGHEST: 20
OHS CV CAROTID ULTRASOUND LEFT ICA/CCA RATIO: 0.95
OHS CV CAROTID ULTRASOUND RIGHT ICA/CCA RATIO: 0.8
OHS CV PV CAROTID LEFT HIGHEST CCA: 88
OHS CV PV CAROTID LEFT HIGHEST ICA: 82
OHS CV PV CAROTID RIGHT HIGHEST CCA: 91
OHS CV PV CAROTID RIGHT HIGHEST ICA: 72
OHS CV US CAROTID LEFT HIGHEST EDV: 21
PLATELET # BLD AUTO: 157 X10(3)/MCL (ref 130–400)
PMV BLD AUTO: 9.3 FL (ref 7.4–10.4)
POTASSIUM SERPL-SCNC: 3.8 MMOL/L (ref 3.5–5.1)
PROT SERPL-MCNC: 5.9 GM/DL (ref 5.8–7.6)
PV PEAK VELOCITY: 0.65 CM/S
RBC # BLD AUTO: 4.1 X10(6)/MCL (ref 4.7–6.1)
RIGHT CCA DIST DIAS: 8 CM/S
RIGHT CCA DIST SYS: 90 CM/S
RIGHT CCA PROX DIAS: 8 CM/S
RIGHT CCA PROX SYS: 91 CM/S
RIGHT ECA DIAS: 0 CM/S
RIGHT ECA SYS: 130 CM/S
RIGHT ICA DIST DIAS: 20 CM/S
RIGHT ICA DIST SYS: 72 CM/S
RIGHT ICA MID DIAS: 18 CM/S
RIGHT ICA MID SYS: 71 CM/S
RIGHT ICA PROX DIAS: 14 CM/S
RIGHT ICA PROX SYS: 67 CM/S
RIGHT VERTEBRAL SYS: 40 CM/S
SODIUM SERPL-SCNC: 139 MMOL/L (ref 136–145)
TDI LATERAL: 0.1 M/S
TDI SEPTAL: 0.07 M/S
TDI: 0.09 M/S
WBC # SPEC AUTO: 10.8 X10(3)/MCL (ref 4.5–11.5)

## 2022-12-04 PROCEDURE — G0378 HOSPITAL OBSERVATION PER HR: HCPCS

## 2022-12-04 PROCEDURE — 85730 THROMBOPLASTIN TIME PARTIAL: CPT | Performed by: INTERNAL MEDICINE

## 2022-12-04 PROCEDURE — 86923 COMPATIBILITY TEST ELECTRIC: CPT | Performed by: PHYSICIAN ASSISTANT

## 2022-12-04 PROCEDURE — 25000003 PHARM REV CODE 250: Performed by: INTERNAL MEDICINE

## 2022-12-04 PROCEDURE — 36415 COLL VENOUS BLD VENIPUNCTURE: CPT | Performed by: INTERNAL MEDICINE

## 2022-12-04 PROCEDURE — 25000003 PHARM REV CODE 250: Performed by: STUDENT IN AN ORGANIZED HEALTH CARE EDUCATION/TRAINING PROGRAM

## 2022-12-04 PROCEDURE — 80053 COMPREHEN METABOLIC PANEL: CPT | Performed by: NURSE PRACTITIONER

## 2022-12-04 PROCEDURE — 63600175 PHARM REV CODE 636 W HCPCS: Performed by: INTERNAL MEDICINE

## 2022-12-04 PROCEDURE — 36415 COLL VENOUS BLD VENIPUNCTURE: CPT | Performed by: NURSE PRACTITIONER

## 2022-12-04 PROCEDURE — 86850 RBC ANTIBODY SCREEN: CPT | Performed by: PHYSICIAN ASSISTANT

## 2022-12-04 PROCEDURE — 85025 COMPLETE CBC W/AUTO DIFF WBC: CPT | Performed by: NURSE PRACTITIONER

## 2022-12-04 RX ORDER — CEFAZOLIN SODIUM 2 G/50ML
2 SOLUTION INTRAVENOUS
Status: DISCONTINUED | OUTPATIENT
Start: 2022-12-04 | End: 2022-12-15 | Stop reason: HOSPADM

## 2022-12-04 RX ORDER — MUPIROCIN 20 MG/G
OINTMENT TOPICAL DAILY
Status: DISCONTINUED | OUTPATIENT
Start: 2022-12-04 | End: 2022-12-15 | Stop reason: HOSPADM

## 2022-12-04 RX ORDER — MUPIROCIN 20 MG/G
OINTMENT TOPICAL
Status: DISCONTINUED | OUTPATIENT
Start: 2022-12-04 | End: 2022-12-15 | Stop reason: HOSPADM

## 2022-12-04 RX ADMIN — ATORVASTATIN CALCIUM 80 MG: 40 TABLET, FILM COATED ORAL at 09:12

## 2022-12-04 RX ADMIN — METOPROLOL TARTRATE 25 MG: 25 TABLET, FILM COATED ORAL at 09:12

## 2022-12-04 RX ADMIN — NITROGLYCERIN 2 INCH: 20 OINTMENT TOPICAL at 12:12

## 2022-12-04 RX ADMIN — MELATONIN TAB 3 MG 6 MG: 3 TAB at 01:12

## 2022-12-04 RX ADMIN — LOSARTAN POTASSIUM 25 MG: 25 TABLET, FILM COATED ORAL at 09:12

## 2022-12-04 RX ADMIN — MELATONIN TAB 3 MG 6 MG: 3 TAB at 11:12

## 2022-12-04 RX ADMIN — ASPIRIN 81 MG CHEWABLE TABLET 81 MG: 81 TABLET CHEWABLE at 09:12

## 2022-12-04 RX ADMIN — MUPIROCIN: 20 OINTMENT TOPICAL at 12:12

## 2022-12-04 RX ADMIN — NITROGLYCERIN 2 INCH: 20 OINTMENT TOPICAL at 06:12

## 2022-12-04 RX ADMIN — HEPARIN SODIUM 13 UNITS/KG/HR: 10000 INJECTION, SOLUTION INTRAVENOUS at 06:12

## 2022-12-04 NOTE — PROGRESS NOTES
[facility] - Observation Unit  Cardiology  Progress Note    Patient Name: Suresh Aragon  MRN: [id number]  Admission Date: 12/1/2022  Hospital Length of Stay: 0 days  Code Status: Full Code   Attending Physician: Yair Benson MD   Primary Care Physician: No primary care provider on file.  Expected Discharge Date:   Principal Problem:Unstable angina    Subjective:     Brief HPI:   This is a 77-year-old male, who is known to Dr. Martinez, with a history of HTN, HLD, GERD, angina.  Patient presents to the ER on 12/01/2022 with complaints of chest pain.  Of note patient was just seen in clinic by Dr. Martinez where he was advised to present to ER if had chest pain secondary to unstable angina prior to his scheduled outpatient LHC.  Patient stated the pain started approximately 7 days prior.  He stated the pain feels like a pressure and radiates down to his left arm.  He said the pain is worse with exertion and improves with rest.  He also had associated shortness of breath with exertion.  Initial troponin was negative however repeat troponins are now mildly elevated.  Initial EKG revealed normal sinus rhythm with a left axis deviation and LVH.  [facility] has admitted the patient for unstable angina.     Hospital Course:   12.3.22: NAD noted. VSS. SR on tele. Denies CP/SOB/Palps.  Right groin benign.  12.4.22: NAD noted. VSS. SR on tele. Denies CP/SOB/Palps. Awaiting CABG.    PMH: HTN, HLD, GERD, angina, Prostate cancer  PSH:   appendectomy, prostate surgery  Social History:  Denies EtOH, tobacco, and illicit drug use  Family History:  Father-CAD, MI, HTN     Previous Cardiac Diagnostics:   Carotid US 12.3.22  The right internal carotid artery demonstrated no hemodynamically significant stenosis   Left internal carotid artery demonstrated less than 50% stenosis.    Bilateral vertebral arteries were patent with antegrade flow    [facility] 12.2.22  1. LEFT MAIN:  MILD CALCIFICATION BUT NO OBSTRUCTIVE DISEASE.  2. LAD:  THE LAD  SHOWS HEAVY CALCIFICATION PROXIMALLY.  THERE IS EVIDENCE OF A 95% STENOSIS PROXIMALLY INVOLVING THE 1ST DIAGONAL.  THE 1ST DIAGONAL SHOWS 95% STENOSIS PROXIMALLY.  THE MID LAD SHOWS 90% STENOSIS.  3. LEFT CIRCUMFLEX:  90% PROXIMAL STENOSIS IS NOTED  4. RIGHT CORONARY ARTERY:  THE RIGHT CORONARY ARTERY SHOWS A LONG, VERY DISTAL 90% LESION WHICH COMPROMISES THE FLOW INTO THE PDA     GRAFT ANGIOGRAPHY:  1. LIMA TO THE LEFT SIDE OF THE CHEST WALL: WIDELY PATENT WITH NO DISEASE  2.  MECHE TO THE RIGHT SIDE OF THE CHEST WALL: WIDELY PATENT WITH NO DISEASE          Review of Systems   Constitutional: Negative for chills and fever.   Cardiovascular:  Negative for chest pain and palpitations.   Respiratory:  Negative for shortness of breath.    Psychiatric/Behavioral:  Negative for altered mental status.          Objective:     Vital Signs (Most Recent):  Temp: 98.1 °F (36.7 °C) (12/04/22 0717)  Pulse: 72 (12/04/22 0717)  Resp: 18 (12/04/22 0717)  BP: (!) 150/80 (12/04/22 0717)  SpO2: 96 % (12/04/22 0717) Vital Signs (24h Range):  Temp:  [97.4 °F (36.3 °C)-98.1 °F (36.7 °C)] 98.1 °F (36.7 °C)  Pulse:  [66-81] 72  Resp:  [16-20] 18  SpO2:  [94 %-97 %] 96 %  BP: (128-165)/(62-84) 150/80     Weight: 78 kg (171 lb 15.3 oz)  Body mass index is 24.67 kg/m².    SpO2: 96 %  O2 Device (Oxygen Therapy): nasal cannula      Intake/Output Summary (Last 24 hours) at 12/4/2022 0718  Last data filed at 12/3/2022 1047  Gross per 24 hour   Intake 360 ml   Output --   Net 360 ml         Lines/Drains/Airways       Peripheral Intravenous Line  Duration                  Peripheral IV - Single Lumen 12/01/22 2215 20 G Left Antecubital 2 days                    Significant Labs: CMP   Recent Labs   Lab 12/04/22  0037      K 3.8   CO2 22*   BUN 11.8   CREATININE 0.84   CALCIUM 8.4*   ALBUMIN 3.7   BILITOT 1.3   ALKPHOS 63   AST 17   ALT 14     , CBC   Recent Labs   Lab 12/03/22  0556 12/04/22  0037   WBC 8.6 10.8   HGB 13.3* 13.2*   HCT 39.6*  39.0*    157     , and Troponin   No results for input(s): TROPONINI in the last 48 hours.      Telemetry:  SR    Physical Exam:  Physical Exam  Constitutional:       Appearance: Normal appearance.   HENT:      Head: Normocephalic.   Eyes:      Extraocular Movements: Extraocular movements intact.      Conjunctiva/sclera: Conjunctivae normal.   Cardiovascular:      Rate and Rhythm: Normal rate and regular rhythm.      Pulses: Normal pulses.      Heart sounds: Normal heart sounds.      Comments: R Groin Soft/Flat, Non-Tender, No Sign of Bleed/Infection. +2 BLE Palpable Pedal Pulses    Pulmonary:      Effort: Pulmonary effort is normal.      Breath sounds: Normal breath sounds.   Abdominal:      Palpations: Abdomen is soft.   Musculoskeletal:         General: Normal range of motion.      Cervical back: Neck supple.   Skin:     General: Skin is warm and dry.   Neurological:      Mental Status: He is alert and oriented to person, place, and time. Mental status is at baseline.   Psychiatric:         Mood and Affect: Mood normal.         Behavior: Behavior normal.       Current Inpatient Medications:    Current Facility-Administered Medications:     aspirin chewable tablet 81 mg, 81 mg, Oral, Daily, Dale Cordoba MD, 81 mg at 12/03/22 0901    atorvastatin tablet 80 mg, 80 mg, Oral, QHS, Candido Martinez MD, 80 mg at 12/03/22 2127    fentaNYL injection, , , PRN, Candido Martinez MD, 50 mcg at 12/02/22 1724    heparin 25,000 units in dextrose 5% (100 units/ml) IV bolus from bag - ADDITIONAL PRN BOLUS - 30 units/kg (max bolus 4000 units), 30 Units/kg (Adjusted), Intravenous, PRN, Candido Martinez MD    heparin 25,000 units in dextrose 5% (100 units/ml) IV bolus from bag - ADDITIONAL PRN BOLUS - 60 units/kg (max bolus 4000 units), 53.3 Units/kg (Adjusted), Intravenous, PRCandido CROSS MD    heparin 25,000 units in dextrose 5% 250 mL (100 units/mL) infusion LOW INTENSITY nomogram - LAF, 20 Units/kg/hr  (Adjusted), Intravenous, Continuous, Candido Martinez MD, Last Rate: 9.8 mL/hr at 12/04/22 0252, 13 Units/kg/hr at 12/04/22 0252    iopamidoL (ISOVUE-370) injection, , , PRN, Candido Martinez MD, 100 mL at 12/02/22 1747    LIDOcaine HCL 10 mg/ml (1%) injection, , , PRN, Candido Martinez MD, 10 mL at 12/02/22 1725    losartan tablet 25 mg, 25 mg, Oral, Daily, Candido Martinez MD, 25 mg at 12/03/22 0901    melatonin tablet 6 mg, 6 mg, Oral, Nightly PRN, Dale Cordoba MD, 6 mg at 12/04/22 0100    metoprolol tartrate (LOPRESSOR) tablet 25 mg, 25 mg, Oral, BID, Candido Martinez MD, 25 mg at 12/03/22 2128    midazolam (VERSED) 1 mg/mL injection, , , PRN, Candido Martinez MD, 1 mg at 12/02/22 1724    nitroGLYCERIN 2% TD oint ointment 2 inch, 2 inch, Topical (Top), Q6H, Candido Martinez MD, 2 inch at 12/04/22 0654    nitroGLYCERIN SL tablet, , , PRN, Candido Martinez MD, 0.1 mg at 12/02/22 1754    perflutren lipid microspheres injection 1.3 mL, 1.3 mL, Intravenous, Once, Yair Benson MD    sodium chloride 0.9% flush 10 mL, 10 mL, Intravenous, PRN, Dale Cordoba MD        Assessment:     IMPRESSION:  NSTEMI type I  MV CAD  Angina  HTN  HLD  GERD  No Hx of GI Bleed      Plan:     PLAN:  Continue ASA, high intensity statin, ARB, BB  Echo Pending  CABG per CT Sx  Right groin precautions/activity restrictions        Nehemias Santos, [facility]-BC  Cardiology  [facility]  12/04/2022

## 2022-12-05 ENCOUNTER — ANESTHESIA (OUTPATIENT)
Dept: SURGERY | Facility: HOSPITAL | Age: 77
DRG: 236 | End: 2022-12-05
Payer: MEDICARE

## 2022-12-05 ENCOUNTER — ANESTHESIA EVENT (OUTPATIENT)
Dept: SURGERY | Facility: HOSPITAL | Age: 77
DRG: 236 | End: 2022-12-05
Payer: MEDICARE

## 2022-12-05 PROBLEM — I10 HYPERTENSION: Status: ACTIVE | Noted: 2022-12-05

## 2022-12-05 PROBLEM — K21.9 GASTROESOPHAGEAL REFLUX DISEASE: Status: ACTIVE | Noted: 2022-12-05

## 2022-12-05 LAB
ABO + RH BLD: NORMAL
ABS NEUT (OLG): 14.92 X10(3)/MCL (ref 2.1–9.2)
ALBUMIN SERPL-MCNC: 3.2 GM/DL (ref 3.4–4.8)
ALBUMIN/GLOB SERPL: 4 RATIO (ref 1.1–2)
ALP SERPL-CCNC: 33 UNIT/L (ref 40–150)
ALT SERPL-CCNC: 16 UNIT/L (ref 0–55)
ANION GAP SERPL CALC-SCNC: 8 MEQ/L
APTT PPP: 30.6 SECONDS (ref 23.2–33.7)
APTT PPP: 35.5 SECONDS (ref 23.2–33.7)
AST SERPL-CCNC: 39 UNIT/L (ref 5–34)
BASOPHILS # BLD AUTO: 0.06 X10(3)/MCL (ref 0–0.2)
BASOPHILS NFR BLD AUTO: 0.5 %
BILIRUBIN DIRECT+TOT PNL SERPL-MCNC: 1.4 MG/DL
BLD PROD TYP BPU: NORMAL
BLOOD UNIT EXPIRATION DATE: NORMAL
BLOOD UNIT TYPE CODE: 5100
BSA FOR ECHO PROCEDURE: 1.96 M2
BUN SERPL-MCNC: 11.2 MG/DL (ref 8.4–25.7)
BUN SERPL-MCNC: 14.3 MG/DL (ref 8.4–25.7)
CALCIUM SERPL-MCNC: 7.1 MG/DL (ref 8.8–10)
CALCIUM SERPL-MCNC: 9 MG/DL (ref 8.8–10)
CHLORIDE SERPL-SCNC: 112 MMOL/L (ref 98–107)
CHLORIDE SERPL-SCNC: 114 MMOL/L (ref 98–107)
CO2 SERPL-SCNC: 18 MMOL/L (ref 23–31)
CO2 SERPL-SCNC: 21 MMOL/L (ref 23–31)
CORRECTED TEMPERATURE (PCO2): 41 MMHG (ref 35–45)
CORRECTED TEMPERATURE (PH): 7.41 (ref 7.35–7.45)
CORRECTED TEMPERATURE (PO2): 79 MMHG (ref 80–100)
CREAT SERPL-MCNC: 0.87 MG/DL (ref 0.73–1.18)
CREAT SERPL-MCNC: 0.87 MG/DL (ref 0.73–1.18)
CREAT/UREA NIT SERPL: 16
CROSSMATCH INTERPRETATION: NORMAL
DISPENSE STATUS: NORMAL
EOSINOPHIL # BLD AUTO: 0.06 X10(3)/MCL (ref 0–0.9)
EOSINOPHIL NFR BLD AUTO: 0.5 %
ERYTHROCYTE [DISTWIDTH] IN BLOOD BY AUTOMATED COUNT: 12.7 % (ref 11.5–17)
ERYTHROCYTE [DISTWIDTH] IN BLOOD BY AUTOMATED COUNT: 12.8 % (ref 11.5–17)
GFR SERPLBLD CREATININE-BSD FMLA CKD-EPI: >60 MLS/MIN/1.73/M2
GFR SERPLBLD CREATININE-BSD FMLA CKD-EPI: >60 MLS/MIN/1.73/M2
GLOBULIN SER-MCNC: 0.8 GM/DL (ref 2.4–3.5)
GLUCOSE SERPL-MCNC: 148 MG/DL (ref 82–115)
GLUCOSE SERPL-MCNC: 183 MG/DL (ref 82–115)
GLUCOSE SERPL-MCNC: 185 MG/DL (ref 70–110)
GLUCOSE SERPL-MCNC: 194 MG/DL (ref 70–110)
HCO3 UR-SCNC: 23.1 MMOL/L (ref 24–28)
HCO3 UR-SCNC: 24.2 MMOL/L (ref 24–28)
HCO3 UR-SCNC: 26 MMOL/L (ref 22–26)
HCT VFR BLD AUTO: 24.8 % (ref 42–52)
HCT VFR BLD AUTO: 30.4 % (ref 42–52)
HCT VFR BLD CALC: 21 %PCV (ref 36–54)
HCT VFR BLD CALC: 23 %PCV (ref 36–54)
HGB BLD-MCNC: 10.4 GM/DL (ref 14–18)
HGB BLD-MCNC: 15.2 G/DL (ref 12–16)
HGB BLD-MCNC: 7 G/DL
HGB BLD-MCNC: 8 G/DL
HGB BLD-MCNC: 8.5 GM/DL (ref 14–18)
IMM GRANULOCYTES # BLD AUTO: 0.09 X10(3)/MCL (ref 0–0.04)
IMM GRANULOCYTES # BLD AUTO: 0.19 X10(3)/MCL (ref 0–0.04)
IMM GRANULOCYTES NFR BLD AUTO: 0.7 %
IMM GRANULOCYTES NFR BLD AUTO: 1 %
INR BLD: 1.6 (ref 0–1.3)
INSTRUMENT WBC (OLG): 18.2 X10(3)/MCL
LYMPHOCYTES # BLD AUTO: 2.36 X10(3)/MCL (ref 0.6–4.6)
LYMPHOCYTES NFR BLD AUTO: 17.7 %
LYMPHOCYTES NFR BLD MANUAL: 18 %
LYMPHOCYTES NFR BLD MANUAL: 3.28 X10(3)/MCL
MAGNESIUM SERPL-MCNC: 2.3 MG/DL (ref 1.6–2.6)
MCH RBC QN AUTO: 33.2 PG (ref 27–31)
MCH RBC QN AUTO: 33.3 PG (ref 27–31)
MCHC RBC AUTO-ENTMCNC: 34.2 MG/DL (ref 33–36)
MCHC RBC AUTO-ENTMCNC: 34.3 MG/DL (ref 33–36)
MCV RBC AUTO: 97.1 FL (ref 80–94)
MCV RBC AUTO: 97.3 FL (ref 80–94)
MONOCYTES # BLD AUTO: 1.22 X10(3)/MCL (ref 0.1–1.3)
MONOCYTES NFR BLD AUTO: 9.2 %
MRSA PCR SCRN (OHS): NOT DETECTED
NEUTROPHILS # BLD AUTO: 9.5 X10(3)/MCL (ref 2.1–9.2)
NEUTROPHILS NFR BLD AUTO: 71.4 %
NEUTROPHILS NFR BLD MANUAL: 82 %
NRBC BLD AUTO-RTO: 0 %
NRBC BLD AUTO-RTO: 0 %
PCO2 BLDA: 34.8 MMHG (ref 35–45)
PCO2 BLDA: 39 MMHG
PCO2 BLDA: 39.9 MMHG (ref 35–45)
PCO2 BLDA: 41 MMHG (ref 35–45)
PCO2 BLDA: 43 MMHG
PH SMN: 7.31 [PH] (ref 7.35–7.45)
PH SMN: 7.35 [PH]
PH SMN: 7.37 [PH] (ref 7.35–7.45)
PH SMN: 7.41 [PH] (ref 7.35–7.45)
PH SMN: 7.45 [PH] (ref 7.35–7.45)
PHOSPHATE SERPL-MCNC: 2.6 MG/DL (ref 2.3–4.7)
PLATELET # BLD AUTO: 117 X10(3)/MCL (ref 130–400)
PLATELET # BLD AUTO: 82 X10(3)/MCL (ref 130–400)
PLATELET # BLD EST: NORMAL 10*3/UL
PMV BLD AUTO: 9.3 FL (ref 7.4–10.4)
PMV BLD AUTO: 9.9 FL (ref 7.4–10.4)
PO2 BLDA: 144 MMHG
PO2 BLDA: 232 MMHG
PO2 BLDA: 239 MMHG (ref 80–100)
PO2 BLDA: 42 MMHG (ref 40–60)
PO2 BLDA: 79 MMHG (ref 80–100)
POC BASE DEFICIT: -3.8 MMOL/L
POC BASE DEFICIT: -4.5 MMOL/L
POC BASE DEFICIT: 1.2 MMOL/L (ref -2–2)
POC BE: -2 MMOL/L
POC BE: 0 MMOL/L
POC COHB: 1.5 %
POC HCO3: 21.5 MMOL/L
POC HCO3: 21.7 MMOL/L
POC IONIZED CALCIUM: 0.89 MMOL/L (ref 1.06–1.42)
POC IONIZED CALCIUM: 0.93 MMOL/L (ref 1.06–1.42)
POC IONIZED CALCIUM: 0.96 MMOL/L (ref 1.1–1.2)
POC IONIZED CALCIUM: 1.09 MMOL/L (ref 1.12–1.23)
POC IONIZED CALCIUM: 1.12 MMOL/L
POC METHB: 1 % (ref 0.4–1.5)
POC O2HB: 94.7 % (ref 94–97)
POC SATURATED O2: 100 %
POC SATURATED O2: 100 % (ref 95–100)
POC SATURATED O2: 76 % (ref 95–100)
POC SATURATED O2: 95.7 %
POC SATURATED O2: 99 %
POC TCO2: 24 MMOL/L (ref 24–29)
POC TCO2: 25 MMOL/L (ref 23–27)
POC TEMPERATURE: 37 C
POC TEMPERATURE: 37 C
POC TEMPERATURE: 37 °C
POCT GLUCOSE: 114 MG/DL (ref 70–110)
POCT GLUCOSE: 129 MG/DL (ref 70–110)
POCT GLUCOSE: 136 MG/DL (ref 70–110)
POCT GLUCOSE: 138 MG/DL (ref 70–110)
POCT GLUCOSE: 144 MG/DL (ref 70–110)
POCT GLUCOSE: 155 MG/DL (ref 70–110)
POCT GLUCOSE: 164 MG/DL (ref 70–110)
POCT GLUCOSE: 176 MG/DL (ref 70–110)
POCT GLUCOSE: 182 MG/DL (ref 70–110)
POCT GLUCOSE: 97 MG/DL (ref 70–110)
POTASSIUM BLD-SCNC: 3.1 MMOL/L
POTASSIUM BLD-SCNC: 3.5 MMOL/L (ref 3.5–5)
POTASSIUM BLD-SCNC: 4.6 MMOL/L
POTASSIUM BLD-SCNC: 4.6 MMOL/L (ref 3.5–5.1)
POTASSIUM BLD-SCNC: 5.1 MMOL/L (ref 3.5–5.1)
POTASSIUM SERPL-SCNC: 3.3 MMOL/L (ref 3.5–5.1)
POTASSIUM SERPL-SCNC: 3.8 MMOL/L (ref 3.5–5.1)
PROT SERPL-MCNC: 4 GM/DL (ref 5.8–7.6)
PROTHROMBIN TIME: 18.8 SECONDS (ref 12.5–14.5)
RBC # BLD AUTO: 2.55 X10(6)/MCL (ref 4.7–6.1)
RBC # BLD AUTO: 3.13 X10(6)/MCL (ref 4.7–6.1)
RBC MORPH BLD: NORMAL
SAMPLE: ABNORMAL
SAMPLE: ABNORMAL
SODIUM BLD-SCNC: 136 MMOL/L (ref 137–145)
SODIUM BLD-SCNC: 137 MMOL/L
SODIUM BLD-SCNC: 138 MMOL/L (ref 136–145)
SODIUM BLD-SCNC: 139 MMOL/L (ref 136–145)
SODIUM BLD-SCNC: 141 MMOL/L
SODIUM SERPL-SCNC: 141 MMOL/L (ref 136–145)
SODIUM SERPL-SCNC: 143 MMOL/L (ref 136–145)
SPECIMEN SOURCE: ABNORMAL
UNIT NUMBER: NORMAL
WBC # SPEC AUTO: 13.3 X10(3)/MCL (ref 4.5–11.5)
WBC # SPEC AUTO: 18.2 X10(3)/MCL (ref 4.5–11.5)

## 2022-12-05 PROCEDURE — 33533 PR CABG, ARTERIAL, SINGLE: ICD-10-PCS | Mod: AS,,,

## 2022-12-05 PROCEDURE — 25000003 PHARM REV CODE 250

## 2022-12-05 PROCEDURE — 63600175 PHARM REV CODE 636 W HCPCS

## 2022-12-05 PROCEDURE — 83735 ASSAY OF MAGNESIUM: CPT

## 2022-12-05 PROCEDURE — C1729 CATH, DRAINAGE: HCPCS | Performed by: THORACIC SURGERY (CARDIOTHORACIC VASCULAR SURGERY)

## 2022-12-05 PROCEDURE — 85730 THROMBOPLASTIN TIME PARTIAL: CPT | Performed by: THORACIC SURGERY (CARDIOTHORACIC VASCULAR SURGERY)

## 2022-12-05 PROCEDURE — 94761 N-INVAS EAR/PLS OXIMETRY MLT: CPT

## 2022-12-05 PROCEDURE — 33518 PR CABG, ARTERY-VEIN, TWO: ICD-10-PCS | Mod: ,,, | Performed by: THORACIC SURGERY (CARDIOTHORACIC VASCULAR SURGERY)

## 2022-12-05 PROCEDURE — 94003 VENT MGMT INPAT SUBQ DAY: CPT

## 2022-12-05 PROCEDURE — 33518 CABG ARTERY-VEIN TWO: CPT | Mod: AS,,,

## 2022-12-05 PROCEDURE — 80048 BASIC METABOLIC PNL TOTAL CA: CPT | Performed by: THORACIC SURGERY (CARDIOTHORACIC VASCULAR SURGERY)

## 2022-12-05 PROCEDURE — 27000221 HC OXYGEN, UP TO 24 HOURS

## 2022-12-05 PROCEDURE — C1887 CATHETER, GUIDING: HCPCS | Performed by: THORACIC SURGERY (CARDIOTHORACIC VASCULAR SURGERY)

## 2022-12-05 PROCEDURE — 33533 CABG ARTERIAL SINGLE: CPT | Mod: ,,, | Performed by: THORACIC SURGERY (CARDIOTHORACIC VASCULAR SURGERY)

## 2022-12-05 PROCEDURE — 36415 COLL VENOUS BLD VENIPUNCTURE: CPT | Performed by: THORACIC SURGERY (CARDIOTHORACIC VASCULAR SURGERY)

## 2022-12-05 PROCEDURE — 37000008 HC ANESTHESIA 1ST 15 MINUTES: Performed by: THORACIC SURGERY (CARDIOTHORACIC VASCULAR SURGERY)

## 2022-12-05 PROCEDURE — 63600175 PHARM REV CODE 636 W HCPCS: Performed by: STUDENT IN AN ORGANIZED HEALTH CARE EDUCATION/TRAINING PROGRAM

## 2022-12-05 PROCEDURE — 99900035 HC TECH TIME PER 15 MIN (STAT)

## 2022-12-05 PROCEDURE — 33508 PR ENDOSCOPY W/VIDEO-ASST VEIN HARVEST,CABG: ICD-10-PCS | Mod: 59,,, | Performed by: THORACIC SURGERY (CARDIOTHORACIC VASCULAR SURGERY)

## 2022-12-05 PROCEDURE — 25000003 PHARM REV CODE 250: Performed by: INTERNAL MEDICINE

## 2022-12-05 PROCEDURE — 87641 MR-STAPH DNA AMP PROBE: CPT | Performed by: INTERNAL MEDICINE

## 2022-12-05 PROCEDURE — 27201423 OPTIME MED/SURG SUP & DEVICES STERILE SUPPLY: Performed by: THORACIC SURGERY (CARDIOTHORACIC VASCULAR SURGERY)

## 2022-12-05 PROCEDURE — P9016 RBC LEUKOCYTES REDUCED: HCPCS | Performed by: PHYSICIAN ASSISTANT

## 2022-12-05 PROCEDURE — 85007 BL SMEAR W/DIFF WBC COUNT: CPT | Performed by: THORACIC SURGERY (CARDIOTHORACIC VASCULAR SURGERY)

## 2022-12-05 PROCEDURE — 37000009 HC ANESTHESIA EA ADD 15 MINS: Performed by: THORACIC SURGERY (CARDIOTHORACIC VASCULAR SURGERY)

## 2022-12-05 PROCEDURE — 36000712 HC OR TIME LEV V 1ST 15 MIN: Performed by: THORACIC SURGERY (CARDIOTHORACIC VASCULAR SURGERY)

## 2022-12-05 PROCEDURE — 33518 CABG ARTERY-VEIN TWO: CPT | Mod: ,,, | Performed by: THORACIC SURGERY (CARDIOTHORACIC VASCULAR SURGERY)

## 2022-12-05 PROCEDURE — 82803 BLOOD GASES ANY COMBINATION: CPT

## 2022-12-05 PROCEDURE — 84100 ASSAY OF PHOSPHORUS: CPT

## 2022-12-05 PROCEDURE — 63600175 PHARM REV CODE 636 W HCPCS: Performed by: THORACIC SURGERY (CARDIOTHORACIC VASCULAR SURGERY)

## 2022-12-05 PROCEDURE — 33533 PR CABG, ARTERIAL, SINGLE: ICD-10-PCS | Mod: ,,, | Performed by: THORACIC SURGERY (CARDIOTHORACIC VASCULAR SURGERY)

## 2022-12-05 PROCEDURE — C1894 INTRO/SHEATH, NON-LASER: HCPCS | Performed by: THORACIC SURGERY (CARDIOTHORACIC VASCULAR SURGERY)

## 2022-12-05 PROCEDURE — 25000003 PHARM REV CODE 250: Performed by: THORACIC SURGERY (CARDIOTHORACIC VASCULAR SURGERY)

## 2022-12-05 PROCEDURE — 20000000 HC ICU ROOM

## 2022-12-05 PROCEDURE — 27200966 HC CLOSED SUCTION SYSTEM

## 2022-12-05 PROCEDURE — S5010 5% DEXTROSE AND 0.45% SALINE: HCPCS

## 2022-12-05 PROCEDURE — 36000713 HC OR TIME LEV V EA ADD 15 MIN: Performed by: THORACIC SURGERY (CARDIOTHORACIC VASCULAR SURGERY)

## 2022-12-05 PROCEDURE — 25000003 PHARM REV CODE 250: Performed by: STUDENT IN AN ORGANIZED HEALTH CARE EDUCATION/TRAINING PROGRAM

## 2022-12-05 PROCEDURE — 80053 COMPREHEN METABOLIC PANEL: CPT | Performed by: THORACIC SURGERY (CARDIOTHORACIC VASCULAR SURGERY)

## 2022-12-05 PROCEDURE — 99900031 HC PATIENT EDUCATION (STAT)

## 2022-12-05 PROCEDURE — 33508 ENDOSCOPIC VEIN HARVEST: CPT | Mod: 59,,, | Performed by: THORACIC SURGERY (CARDIOTHORACIC VASCULAR SURGERY)

## 2022-12-05 PROCEDURE — 36620 INSERTION CATHETER ARTERY: CPT | Performed by: ANESTHESIOLOGY

## 2022-12-05 PROCEDURE — 85610 PROTHROMBIN TIME: CPT | Performed by: THORACIC SURGERY (CARDIOTHORACIC VASCULAR SURGERY)

## 2022-12-05 PROCEDURE — 37799 UNLISTED PX VASCULAR SURGERY: CPT

## 2022-12-05 PROCEDURE — 33518 PR CABG, ARTERY-VEIN, TWO: ICD-10-PCS | Mod: AS,,,

## 2022-12-05 PROCEDURE — 33533 CABG ARTERIAL SINGLE: CPT | Mod: AS,,,

## 2022-12-05 PROCEDURE — P9045 ALBUMIN (HUMAN), 5%, 250 ML: HCPCS | Mod: JG

## 2022-12-05 RX ORDER — SUCRALFATE 1 G/1
1 TABLET ORAL EVERY 6 HOURS
Status: DISCONTINUED | OUTPATIENT
Start: 2022-12-05 | End: 2022-12-15 | Stop reason: HOSPADM

## 2022-12-05 RX ORDER — PROTAMINE SULFATE 10 MG/ML
INJECTION, SOLUTION INTRAVENOUS
Status: DISCONTINUED | OUTPATIENT
Start: 2022-12-05 | End: 2022-12-05

## 2022-12-05 RX ORDER — POTASSIUM CHLORIDE 29.8 MG/ML
40 INJECTION INTRAVENOUS
Status: DISCONTINUED | OUTPATIENT
Start: 2022-12-05 | End: 2022-12-10

## 2022-12-05 RX ORDER — CEFAZOLIN SODIUM 2 G/50ML
2 SOLUTION INTRAVENOUS
Status: DISPENSED | OUTPATIENT
Start: 2022-12-05 | End: 2022-12-06

## 2022-12-05 RX ORDER — LOPERAMIDE HYDROCHLORIDE 2 MG/1
2 CAPSULE ORAL CONTINUOUS PRN
Status: DISCONTINUED | OUTPATIENT
Start: 2022-12-05 | End: 2022-12-15 | Stop reason: HOSPADM

## 2022-12-05 RX ORDER — MUPIROCIN 20 MG/G
OINTMENT TOPICAL 2 TIMES DAILY
Status: DISPENSED | OUTPATIENT
Start: 2022-12-05 | End: 2022-12-10

## 2022-12-05 RX ORDER — DEXMEDETOMIDINE HYDROCHLORIDE 4 UG/ML
0-1.4 INJECTION, SOLUTION INTRAVENOUS CONTINUOUS
Status: DISCONTINUED | OUTPATIENT
Start: 2022-12-05 | End: 2022-12-09

## 2022-12-05 RX ORDER — SODIUM CHLORIDE, SODIUM GLUCONATE, SODIUM ACETATE, POTASSIUM CHLORIDE AND MAGNESIUM CHLORIDE 30; 37; 368; 526; 502 MG/100ML; MG/100ML; MG/100ML; MG/100ML; MG/100ML
INJECTION, SOLUTION INTRAVENOUS CONTINUOUS
Status: ACTIVE | OUTPATIENT
Start: 2022-12-05 | End: 2022-12-06

## 2022-12-05 RX ORDER — DEXTROSE MONOHYDRATE AND SODIUM CHLORIDE 5; .45 G/100ML; G/100ML
INJECTION, SOLUTION INTRAVENOUS CONTINUOUS
Status: DISCONTINUED | OUTPATIENT
Start: 2022-12-05 | End: 2022-12-09

## 2022-12-05 RX ORDER — POTASSIUM CHLORIDE 14.9 MG/ML
20 INJECTION INTRAVENOUS
Status: DISCONTINUED | OUTPATIENT
Start: 2022-12-05 | End: 2022-12-10

## 2022-12-05 RX ORDER — METOPROLOL TARTRATE 25 MG/1
12.5 TABLET ORAL 2 TIMES DAILY
Status: DISCONTINUED | OUTPATIENT
Start: 2022-12-06 | End: 2022-12-12

## 2022-12-05 RX ORDER — AMLODIPINE BESYLATE 5 MG/1
5 TABLET ORAL DAILY
Status: DISCONTINUED | OUTPATIENT
Start: 2022-12-06 | End: 2022-12-12

## 2022-12-05 RX ORDER — ALBUMIN HUMAN 250 G/1000ML
SOLUTION INTRAVENOUS CONTINUOUS PRN
Status: DISCONTINUED | OUTPATIENT
Start: 2022-12-05 | End: 2022-12-05

## 2022-12-05 RX ORDER — DOCUSATE SODIUM 100 MG/1
100 CAPSULE, LIQUID FILLED ORAL 2 TIMES DAILY
Status: DISCONTINUED | OUTPATIENT
Start: 2022-12-06 | End: 2022-12-15 | Stop reason: HOSPADM

## 2022-12-05 RX ORDER — PAPAVERINE HYDROCHLORIDE 30 MG/ML
INJECTION INTRAMUSCULAR; INTRAVENOUS
Status: DISCONTINUED | OUTPATIENT
Start: 2022-12-05 | End: 2022-12-05 | Stop reason: HOSPADM

## 2022-12-05 RX ORDER — MIDAZOLAM HYDROCHLORIDE 1 MG/ML
INJECTION INTRAMUSCULAR; INTRAVENOUS
Status: DISCONTINUED | OUTPATIENT
Start: 2022-12-05 | End: 2022-12-05

## 2022-12-05 RX ORDER — FENTANYL CITRATE 50 UG/ML
INJECTION, SOLUTION INTRAMUSCULAR; INTRAVENOUS
Status: DISCONTINUED | OUTPATIENT
Start: 2022-12-05 | End: 2022-12-05

## 2022-12-05 RX ORDER — ACETAMINOPHEN 650 MG/20.3ML
650 LIQUID ORAL EVERY 6 HOURS PRN
Status: DISCONTINUED | OUTPATIENT
Start: 2022-12-05 | End: 2022-12-15 | Stop reason: HOSPADM

## 2022-12-05 RX ORDER — DEXTROSE MONOHYDRATE 5 G/100ML
INJECTION INTRAVENOUS
Status: DISPENSED
Start: 2022-12-05 | End: 2022-12-05

## 2022-12-05 RX ORDER — HYDROCODONE BITARTRATE AND ACETAMINOPHEN 500; 5 MG/1; MG/1
TABLET ORAL
Status: DISCONTINUED | OUTPATIENT
Start: 2022-12-05 | End: 2022-12-15 | Stop reason: HOSPADM

## 2022-12-05 RX ORDER — VASOPRESSIN 20 [USP'U]/ML
INJECTION, SOLUTION INTRAMUSCULAR; SUBCUTANEOUS
Status: DISCONTINUED | OUTPATIENT
Start: 2022-12-05 | End: 2022-12-05

## 2022-12-05 RX ORDER — MAG HYDROX/ALUMINUM HYD/SIMETH 200-200-20
30 SUSPENSION, ORAL (FINAL DOSE FORM) ORAL
Status: DISCONTINUED | OUTPATIENT
Start: 2022-12-05 | End: 2022-12-06

## 2022-12-05 RX ORDER — LIDOCAINE HYDROCHLORIDE 10 MG/ML
1 INJECTION, SOLUTION EPIDURAL; INFILTRATION; INTRACAUDAL; PERINEURAL ONCE
Status: CANCELLED | OUTPATIENT
Start: 2022-12-05 | End: 2022-12-05

## 2022-12-05 RX ORDER — MORPHINE SULFATE 4 MG/ML
INJECTION, SOLUTION INTRAMUSCULAR; INTRAVENOUS
Status: COMPLETED
Start: 2022-12-05 | End: 2022-12-05

## 2022-12-05 RX ORDER — OXYCODONE HYDROCHLORIDE 5 MG/1
10 TABLET ORAL EVERY 4 HOURS PRN
Status: DISCONTINUED | OUTPATIENT
Start: 2022-12-05 | End: 2022-12-15 | Stop reason: HOSPADM

## 2022-12-05 RX ORDER — CALCIUM GLUCONATE 20 MG/ML
2 INJECTION, SOLUTION INTRAVENOUS
Status: DISCONTINUED | OUTPATIENT
Start: 2022-12-05 | End: 2022-12-15 | Stop reason: HOSPADM

## 2022-12-05 RX ORDER — EPHEDRINE SULFATE 50 MG/ML
INJECTION, SOLUTION INTRAVENOUS
Status: DISCONTINUED | OUTPATIENT
Start: 2022-12-05 | End: 2022-12-05

## 2022-12-05 RX ORDER — ALBUMIN HUMAN 50 G/1000ML
12.5 SOLUTION INTRAVENOUS
Status: DISCONTINUED | OUTPATIENT
Start: 2022-12-05 | End: 2022-12-15 | Stop reason: HOSPADM

## 2022-12-05 RX ORDER — HEPARIN SODIUM 1000 [USP'U]/ML
INJECTION, SOLUTION INTRAVENOUS; SUBCUTANEOUS
Status: DISCONTINUED | OUTPATIENT
Start: 2022-12-05 | End: 2022-12-05

## 2022-12-05 RX ORDER — KETOROLAC TROMETHAMINE 30 MG/ML
15 INJECTION, SOLUTION INTRAMUSCULAR; INTRAVENOUS EVERY 6 HOURS
Status: COMPLETED | OUTPATIENT
Start: 2022-12-05 | End: 2022-12-08

## 2022-12-05 RX ORDER — LACTULOSE 10 G/15ML
20 SOLUTION ORAL EVERY 6 HOURS PRN
Status: DISCONTINUED | OUTPATIENT
Start: 2022-12-05 | End: 2022-12-15 | Stop reason: HOSPADM

## 2022-12-05 RX ORDER — CALCIUM CHLORIDE INJECTION 100 MG/ML
INJECTION, SOLUTION INTRAVENOUS
Status: DISCONTINUED | OUTPATIENT
Start: 2022-12-05 | End: 2022-12-05 | Stop reason: HOSPADM

## 2022-12-05 RX ORDER — ONDANSETRON 4 MG/1
8 TABLET, ORALLY DISINTEGRATING ORAL EVERY 6 HOURS PRN
Status: CANCELLED | OUTPATIENT
Start: 2022-12-05

## 2022-12-05 RX ORDER — DEXTROSE MONOHYDRATE 100 MG/ML
25 INJECTION, SOLUTION INTRAVENOUS
Status: DISCONTINUED | OUTPATIENT
Start: 2022-12-05 | End: 2022-12-15 | Stop reason: HOSPADM

## 2022-12-05 RX ORDER — CALCIUM GLUCONATE 20 MG/ML
3 INJECTION, SOLUTION INTRAVENOUS
Status: DISCONTINUED | OUTPATIENT
Start: 2022-12-05 | End: 2022-12-15 | Stop reason: HOSPADM

## 2022-12-05 RX ORDER — MAGNESIUM SULFATE HEPTAHYDRATE 40 MG/ML
4 INJECTION, SOLUTION INTRAVENOUS
Status: DISCONTINUED | OUTPATIENT
Start: 2022-12-05 | End: 2022-12-10

## 2022-12-05 RX ORDER — METOCLOPRAMIDE HYDROCHLORIDE 5 MG/ML
5 INJECTION INTRAMUSCULAR; INTRAVENOUS EVERY 6 HOURS PRN
Status: DISCONTINUED | OUTPATIENT
Start: 2022-12-05 | End: 2022-12-15 | Stop reason: HOSPADM

## 2022-12-05 RX ORDER — ONDANSETRON 2 MG/ML
INJECTION INTRAMUSCULAR; INTRAVENOUS
Status: DISPENSED
Start: 2022-12-05 | End: 2022-12-06

## 2022-12-05 RX ORDER — HYDROCODONE BITARTRATE AND ACETAMINOPHEN 5; 325 MG/1; MG/1
1 TABLET ORAL EVERY 4 HOURS PRN
Status: DISCONTINUED | OUTPATIENT
Start: 2022-12-05 | End: 2022-12-15 | Stop reason: HOSPADM

## 2022-12-05 RX ORDER — POTASSIUM CHLORIDE 14.9 MG/ML
60 INJECTION INTRAVENOUS
Status: DISCONTINUED | OUTPATIENT
Start: 2022-12-05 | End: 2022-12-10

## 2022-12-05 RX ORDER — DEXTROSE MONOHYDRATE 100 MG/ML
12.5 INJECTION, SOLUTION INTRAVENOUS
Status: DISCONTINUED | OUTPATIENT
Start: 2022-12-05 | End: 2022-12-15 | Stop reason: HOSPADM

## 2022-12-05 RX ORDER — ASPIRIN 81 MG/1
81 TABLET ORAL DAILY
Status: DISCONTINUED | OUTPATIENT
Start: 2022-12-06 | End: 2022-12-15 | Stop reason: HOSPADM

## 2022-12-05 RX ORDER — MORPHINE SULFATE 4 MG/ML
4 INJECTION, SOLUTION INTRAMUSCULAR; INTRAVENOUS EVERY 4 HOURS PRN
Status: DISCONTINUED | OUTPATIENT
Start: 2022-12-05 | End: 2022-12-09

## 2022-12-05 RX ORDER — SODIUM CHLORIDE, SODIUM GLUCONATE, SODIUM ACETATE, POTASSIUM CHLORIDE AND MAGNESIUM CHLORIDE 30; 37; 368; 526; 502 MG/100ML; MG/100ML; MG/100ML; MG/100ML; MG/100ML
INJECTION, SOLUTION INTRAVENOUS CONTINUOUS
Status: CANCELLED | OUTPATIENT
Start: 2022-12-05 | End: 2023-01-04

## 2022-12-05 RX ORDER — TRANEXAMIC ACID 100 MG/ML
INJECTION, SOLUTION INTRAVENOUS
Status: DISCONTINUED | OUTPATIENT
Start: 2022-12-05 | End: 2022-12-05

## 2022-12-05 RX ORDER — ONDANSETRON 2 MG/ML
4 INJECTION INTRAMUSCULAR; INTRAVENOUS EVERY 4 HOURS PRN
Status: DISCONTINUED | OUTPATIENT
Start: 2022-12-05 | End: 2022-12-09

## 2022-12-05 RX ORDER — PROPOFOL 10 MG/ML
VIAL (ML) INTRAVENOUS
Status: DISCONTINUED | OUTPATIENT
Start: 2022-12-05 | End: 2022-12-05

## 2022-12-05 RX ORDER — FOLIC ACID 1 MG/1
1 TABLET ORAL DAILY
Status: DISCONTINUED | OUTPATIENT
Start: 2022-12-06 | End: 2022-12-15 | Stop reason: HOSPADM

## 2022-12-05 RX ORDER — HEPARIN SODIUM 1000 [USP'U]/ML
INJECTION, SOLUTION INTRAVENOUS; SUBCUTANEOUS
Status: DISCONTINUED | OUTPATIENT
Start: 2022-12-05 | End: 2022-12-05 | Stop reason: HOSPADM

## 2022-12-05 RX ORDER — ROCURONIUM BROMIDE 10 MG/ML
INJECTION, SOLUTION INTRAVENOUS
Status: DISCONTINUED | OUTPATIENT
Start: 2022-12-05 | End: 2022-12-05

## 2022-12-05 RX ORDER — MIDAZOLAM HYDROCHLORIDE 1 MG/ML
INJECTION INTRAMUSCULAR; INTRAVENOUS
Status: COMPLETED
Start: 2022-12-05 | End: 2022-12-05

## 2022-12-05 RX ORDER — MAGNESIUM SULFATE HEPTAHYDRATE 40 MG/ML
2 INJECTION, SOLUTION INTRAVENOUS
Status: DISCONTINUED | OUTPATIENT
Start: 2022-12-05 | End: 2022-12-10

## 2022-12-05 RX ORDER — SUCRALFATE 1 G/1
1 TABLET ORAL
Status: DISCONTINUED | OUTPATIENT
Start: 2022-12-06 | End: 2022-12-05

## 2022-12-05 RX ORDER — CEFUROXIME SODIUM 1.5 G/16ML
INJECTION, POWDER, FOR SOLUTION INTRAVENOUS
Status: DISPENSED
Start: 2022-12-05 | End: 2022-12-05

## 2022-12-05 RX ORDER — FAMOTIDINE 10 MG/ML
20 INJECTION INTRAVENOUS DAILY
Status: DISCONTINUED | OUTPATIENT
Start: 2022-12-06 | End: 2022-12-15 | Stop reason: HOSPADM

## 2022-12-05 RX ORDER — MIDAZOLAM HYDROCHLORIDE 1 MG/ML
2 INJECTION INTRAMUSCULAR; INTRAVENOUS ONCE AS NEEDED
Status: CANCELLED | OUTPATIENT
Start: 2022-12-05 | End: 2034-05-03

## 2022-12-05 RX ORDER — ACETAMINOPHEN 10 MG/ML
1000 INJECTION, SOLUTION INTRAVENOUS ONCE
Status: COMPLETED | OUTPATIENT
Start: 2022-12-05 | End: 2022-12-05

## 2022-12-05 RX ORDER — CALCIUM GLUCONATE 20 MG/ML
1 INJECTION, SOLUTION INTRAVENOUS
Status: DISCONTINUED | OUTPATIENT
Start: 2022-12-05 | End: 2022-12-15 | Stop reason: HOSPADM

## 2022-12-05 RX ORDER — ENOXAPARIN SODIUM 100 MG/ML
40 INJECTION SUBCUTANEOUS EVERY 24 HOURS
Status: DISCONTINUED | OUTPATIENT
Start: 2022-12-06 | End: 2022-12-12

## 2022-12-05 RX ADMIN — EPINEPHRINE 0.02 MCG/KG/MIN: 1 INJECTION INTRAMUSCULAR; INTRAVENOUS; SUBCUTANEOUS at 05:12

## 2022-12-05 RX ADMIN — PROTAMINE SULFATE 50 MG: 10 INJECTION, SOLUTION INTRAVENOUS at 02:12

## 2022-12-05 RX ADMIN — HEPARIN SODIUM 30000 UNITS: 1000 INJECTION, SOLUTION INTRAVENOUS; SUBCUTANEOUS at 01:12

## 2022-12-05 RX ADMIN — EPHEDRINE SULFATE 20 MG: 50 INJECTION INTRAVENOUS at 12:12

## 2022-12-05 RX ADMIN — SODIUM CHLORIDE: 9 INJECTION, SOLUTION INTRAVENOUS at 12:12

## 2022-12-05 RX ADMIN — EPHEDRINE SULFATE 10 MG: 50 INJECTION INTRAVENOUS at 12:12

## 2022-12-05 RX ADMIN — FENTANYL CITRATE 100 MCG: 50 INJECTION, SOLUTION INTRAMUSCULAR; INTRAVENOUS at 01:12

## 2022-12-05 RX ADMIN — MIDAZOLAM HYDROCHLORIDE 2 MG: 1 INJECTION, SOLUTION INTRAMUSCULAR; INTRAVENOUS at 12:12

## 2022-12-05 RX ADMIN — CALCIUM CHLORIDE INJECTION 250 G: 100 INJECTION, SOLUTION INTRAVENOUS at 03:12

## 2022-12-05 RX ADMIN — ROCURONIUM BROMIDE 40 MG: 10 INJECTION, SOLUTION INTRAVENOUS at 01:12

## 2022-12-05 RX ADMIN — TRANEXAMIC ACID 1000 MG: 100 INJECTION, SOLUTION INTRAVENOUS at 02:12

## 2022-12-05 RX ADMIN — POTASSIUM CHLORIDE 20 MEQ: 14.9 INJECTION, SOLUTION INTRAVENOUS at 06:12

## 2022-12-05 RX ADMIN — VASOPRESSIN 1 UNITS: 20 INJECTION INTRAVENOUS at 12:12

## 2022-12-05 RX ADMIN — DEXMEDETOMIDINE HYDROCHLORIDE 0.6 MCG/KG/HR: 400 INJECTION INTRAVENOUS at 02:12

## 2022-12-05 RX ADMIN — SUGAMMADEX 156 MG: 100 INJECTION, SOLUTION INTRAVENOUS at 03:12

## 2022-12-05 RX ADMIN — SODIUM CHLORIDE 2 UNITS/HR: 9 INJECTION, SOLUTION INTRAVENOUS at 02:12

## 2022-12-05 RX ADMIN — ALBUMIN HUMAN: 250 SOLUTION INTRAVENOUS at 03:12

## 2022-12-05 RX ADMIN — TRANEXAMIC ACID 1000 MG: 100 INJECTION, SOLUTION INTRAVENOUS at 12:12

## 2022-12-05 RX ADMIN — FENTANYL CITRATE 150 MCG: 50 INJECTION, SOLUTION INTRAMUSCULAR; INTRAVENOUS at 01:12

## 2022-12-05 RX ADMIN — EPINEPHRINE 0.02 MCG/KG/MIN: 1 INJECTION INTRAMUSCULAR; INTRAVENOUS; SUBCUTANEOUS at 02:12

## 2022-12-05 RX ADMIN — ACETAMINOPHEN 1000 MG: 10 INJECTION, SOLUTION INTRAVENOUS at 04:12

## 2022-12-05 RX ADMIN — FENTANYL CITRATE 50 MCG: 50 INJECTION, SOLUTION INTRAMUSCULAR; INTRAVENOUS at 04:12

## 2022-12-05 RX ADMIN — FENTANYL CITRATE 250 MCG: 50 INJECTION, SOLUTION INTRAMUSCULAR; INTRAVENOUS at 12:12

## 2022-12-05 RX ADMIN — MORPHINE SULFATE 4 MG: 4 INJECTION INTRAVENOUS at 10:12

## 2022-12-05 RX ADMIN — PROTAMINE SULFATE 350 MG: 10 INJECTION, SOLUTION INTRAVENOUS at 02:12

## 2022-12-05 RX ADMIN — MIDAZOLAM HYDROCHLORIDE 2 MG: 1 INJECTION, SOLUTION INTRAMUSCULAR; INTRAVENOUS at 11:12

## 2022-12-05 RX ADMIN — CALCIUM CHLORIDE INJECTION 250 G: 100 INJECTION, SOLUTION INTRAVENOUS at 02:12

## 2022-12-05 RX ADMIN — ONDANSETRON 4 MG: 2 INJECTION INTRAMUSCULAR; INTRAVENOUS at 05:12

## 2022-12-05 RX ADMIN — ROCURONIUM BROMIDE 60 MG: 10 INJECTION, SOLUTION INTRAVENOUS at 12:12

## 2022-12-05 RX ADMIN — ALBUMIN (HUMAN) 12.5 G: 2.5 SOLUTION INTRAVENOUS at 05:12

## 2022-12-05 RX ADMIN — DEXTROSE AND SODIUM CHLORIDE: 5; 450 INJECTION, SOLUTION INTRAVENOUS at 05:12

## 2022-12-05 RX ADMIN — MUPIROCIN: 20 OINTMENT TOPICAL at 09:12

## 2022-12-05 RX ADMIN — PROPOFOL 100 MG: 10 INJECTION, EMULSION INTRAVENOUS at 12:12

## 2022-12-05 RX ADMIN — MIDAZOLAM HYDROCHLORIDE 3 MG: 1 INJECTION, SOLUTION INTRAMUSCULAR; INTRAVENOUS at 02:12

## 2022-12-05 RX ADMIN — CEFUROXIME SODIUM 1.5 G: 1.5 INJECTION, POWDER, FOR SOLUTION INTRAVENOUS at 12:12

## 2022-12-05 RX ADMIN — ROCURONIUM BROMIDE 50 MG: 10 INJECTION, SOLUTION INTRAVENOUS at 02:12

## 2022-12-05 RX ADMIN — KETOROLAC TROMETHAMINE 15 MG: 30 INJECTION, SOLUTION INTRAMUSCULAR at 06:12

## 2022-12-05 RX ADMIN — MORPHINE SULFATE 4 MG: 4 INJECTION INTRAVENOUS at 05:12

## 2022-12-05 RX ADMIN — MIDAZOLAM 2 MG: 1 INJECTION INTRAMUSCULAR; INTRAVENOUS at 11:12

## 2022-12-05 RX ADMIN — FENTANYL CITRATE 50 MCG: 50 INJECTION, SOLUTION INTRAMUSCULAR; INTRAVENOUS at 01:12

## 2022-12-05 RX ADMIN — METOPROLOL TARTRATE 25 MG: 25 TABLET, FILM COATED ORAL at 09:12

## 2022-12-05 NOTE — ANESTHESIA PREPROCEDURE EVALUATION
12/05/2022  Suresh Aragon is a 77 y.o., male presented December 1st with chest pain and further workup revealed severe CAD with non ST elevation MI (EF preserved) now requiring CABG.    Transthoracic echo December 3, 2022   EF 50%  Grade 1 diastolic dysfunction  No significant valvular disease    Left heart catheterization December 2, 2022   Left main mild disease  Lad 90% mid with 95% 1st diagonal  Left circumflex 90% proximal  RCA 90% distal  EF 60%   LVEDP 7  No AS no MR        Pre-op Assessment    I have reviewed the Patient Summary Reports.    I have reviewed the NPO Status.   I have reviewed the Medications.     Review of Systems  Anesthesia Hx:   Denies Personal Hx of Anesthesia complications.   Social:  Non-Smoker    Cardiovascular:   Angina  Functional Capacity 2 METS  Coronary Artery Disease: Hx of Myocardial Infarction, MI is acute = within 1 week, NSTEMI        Physical Exam  General: Well nourished, Cooperative, Alert and Oriented    Airway:  Mallampati: II   Mouth Opening: Normal  TM Distance: Normal  Tongue: Normal  Neck ROM: Normal ROM    Dental:  Intact    Chest/Lungs:  Clear to auscultation, Normal Respiratory Rate    Heart:  Rate: Normal  Rhythm: Regular Rhythm        Anesthesia Plan  Type of Anesthesia, risks & benefits discussed:    Anesthesia Type: Gen ETT  Intra-op Monitoring Plan: Standard ASA Monitors, Art Line, Central Line, KEVIN and CO  Post Op Pain Control Plan: multimodal analgesia and IV/PO Opioids PRN  Induction:  IV  Airway Plan: Direct  Informed Consent: Informed consent signed with the Patient and all parties understand the risks and agree with anesthesia plan.  All questions answered. Patient consented to blood products? Yes  ASA Score: 4  Day of Surgery Review of History & Physical: H&P Update referred to the surgeon/provider.    Ready For Surgery From Anesthesia  Perspective.     .

## 2022-12-05 NOTE — OP NOTE
[facility]                       [address] [place] [id number]    PATIENT NAME:      RAMON GIRALDO   YOB: 1945  CSN:               [id number]  MRN:               [id number]  ADMIT DATE:        12/01/2022 21:39:00  PHYSICIAN:         Vini Ricks MD                          OPERATIVE REPORT      DATE OF SURGERY:    12/05/2022 00:00:00    SURGEON:  Vini Ricks MD    PREOPERATIVE DIAGNOSIS:  Severe triple-vessel coronary artery disease.    PROCEDURES:    1. Coronary artery bypass grafting x3 with left internal mammary artery to the   left anterior descending, saphenous venous graft to diagonal, and saphenous   venous graft to posterior descending artery.   2. Endoscopic venous harvesting, left greater saphenous vein.  3. Application of shorter graft to sternal edges.    POSTOPERATIVE DIAGNOSIS:  Severe triple-vessel coronary artery disease.    ASSISTANT:  Keagan Olson.    BLOOD LOSS:  Per perfusion.    ANESTHESIA:  General.    TECHNIQUE:  Under informed consent, the patient was taken to the OR in the   supine position.  General anesthesia was induced and therefore maintained for   the remainder of the procedure.  All pressure points were padded equally.  Skin   over chest, abdomen, and legs was prepped and draped in the usual sterile   fashion.  IV antibiotics were administered.  Anesthesia placed appropriate   lines.  Endoscopic venous harvesting was performed in the left lower extremity   with good quality vein.  Next, a midline incision was made followed by taking   down subcutaneous tissue and fascia, exposing the sternum that was penetrated in   the midline.  The mammary was harvested on the pedicle.  The patient was   heparinized.  Midline retractor was placed.  Pericardium was opened.    Pericardial stay suture was placed.  Ascending aortic cannulation was performed.    The mammary was cut.  Distal  pedicle was ligated and then clipped.  Proximal   pedicle was controlled with bulldog.  It was shaped for later anastomosis.    Mammary had excellent flow in it.  Dual-stage venous cannula was placed in the   right atrium when the ACT was therapeutic.  The patient went on full   cardiopulmonary bypass with good emptying.  Crossclamp was placed followed by   antegrade dose of cardioplegia, 1 L of cold blood, with good isoelectricity   throughout the whole case.  Examination of the lateral aspect of the heart   revealed the OM vessel.  This was so small, not suitable for bypass.  Next to it   was the P lateral vessel.  This was opened.  This was 1.5 to 1.75 mm in   diameter.  It was anastomosed to the reverse segment of saphenous vein.  Good   flow down the graft.  The vein was cut to the appropriate length.  Next, the   diagonal vessel was opened.  This was a 2 mm in diameter.  It was anastomosed to   reverse segment saphenous vein with good flow down the graft.  The vein was cut   to the appropriate length.  The patient was being rewarmed.  A slit was made in   the pericardium.  The LAD was opened in the mid epicardium.  This was a 2 mm   vessel.  It was anastomosed to mammary in running Prolene fashion.  Mammary   pedicle was tacked to the epicardium.  There was brisk blood flow down the LAD   when the mammary bulldog was released.  The crossclamp was removed.  Partial   occlusion clamp was placed.  Two arteriotomies were performed.  A 4 mm punch was   used.  Proximal anastomoses were made and marked.  Partial occlusion clamp was   released.  The left chest was evacuated of fluid.  Proximal and distal   anastomoses were checked for hemostasis.  When the patient was warmed, he came   off pump with no problem.  Heparin was reversed with protamine.  Mediastinum and   left chest were drained.  The V wires were placed.  The patient was pacemaker   dependent.  The patient was decannulated.  The sternum was wired, and the  wounds   were closed in layers of absorbable suture.  The patient tolerated the   procedure well.  He was transferred to the ICU in acceptable condition.        ______________________________  MD CARY Tony/KEM  DD:  12/05/2022  Time:  03:08PM  DT:  12/05/2022  Time:  03:54PM  Job #:  [id number]      OPERATIVE REPORT

## 2022-12-05 NOTE — ANESTHESIA PROCEDURE NOTES
Intubation    Date/Time: 12/5/2022 12:35 PM  Performed by: Delta Ware CRNA  Authorized by: Artis Gibbs Jr., MD     Intubation:     Induction:  Intravenous    Intubated:  Postinduction    Mask Ventilation:  Easy mask    Attempts:  1    Attempted By:  CRNA    Method of Intubation:  Direct    Blade:  Apryl 3    Laryngeal View Grade: Grade IIA - cords partially seen      Difficult Airway Encountered?: No      Complications:  None    Airway Device:  Oral endotracheal tube    Airway Device Size:  8.0    Style/Cuff Inflation:  Cuffed (inflated to minimal occlusive pressure)    Tube secured:  22    Secured at:  The lips    Placement Verified By:  Capnometry    Complicating Factors:  None    Findings Post-Intubation:  BS equal bilateral and atraumatic/condition of teeth unchanged

## 2022-12-05 NOTE — PLAN OF CARE
12/05/22 1147   Discharge Assessment   Assessment Type Discharge Planning Assessment   Confirmed Demographics Correct on Facesheet   Source of Information patient;family   Does patient/caregiver understand observation status   (inpatient)   Communicated JEFF with patient/caregiver Yes   Reason For Admission unstable angina   Lives With spouse   Facility Arrived From: home   Do you expect to return to your current living situation? Yes   Do you have help at home or someone to help you manage your care at home? No   Prior to hospitilization cognitive status: Alert/Oriented;No Deficits   Current cognitive status: Alert/Oriented;No Deficits   Walking or Climbing Stairs Difficulty none   Dressing/Bathing Difficulty none   Equipment Currently Used at Home none   Readmission within 30 days? No   Patient currently being followed by outpatient case management? No   Do you currently have service(s) that help you manage your care at home? No   Do you take prescription medications? Yes   Do you have prescription coverage? Yes   Coverage [employer]   Do you have any problems affording any of your prescribed medications? No   Who is going to help you get home at discharge? [facility] cherie [phone] [facility] joselito [phone]   How do you get to doctors appointments? car, drives self   Are you on dialysis? No   Discharge Plan A Home Health   Discharge Plan B Home Health   DME Needed Upon Discharge  none   Discharge Plan discussed with: Patient;Adult children   Discharge Barriers Identified None   Financial Resource Strain   How hard is it for you to pay for the very basics like food, housing, medical care, and heating? Not hard   Housing Stability   In the last 12 months, was there a time when you were not able to pay the mortgage or rent on time? N   In the last 12 months, how many places have you lived?   (1)   Transportation Needs   In the past 12 months, has lack of transportation kept you from medical appointments or from getting  medications? no   In the past 12 months, has lack of transportation kept you from meetings, work, or from getting things needed for daily living? No   Food Insecurity   Within the past 12 months, you worried that your food would run out before you got the money to buy more. Never true   Within the past 12 months, the food you bought just didn't last and you didn't have money to get more. Never true   Social Connections   Are you , , , , never , or living with a partner?    Relationship/Environment   Name(s) of Who Lives With Patient wife   Met with pt. Today his dgtrs. Ashley [phone] resides in [place] and Tawana [phone]/both are POA for their dad.  Pt. Is alert and oriented and made his wishes known to both.  Ashley will provide copy of POA papers on her next visit here.  Mr. Prince is the primary caregiver for his wife, ANGELITO . With Alzheimers.  He is quite concerned about his wife's care while he is inpatient, and also while recuperating at home. She currently attends [employer] [facility]  in [place], however she requires supv. At all times.  I  Provided dgtrs. With a sitters list and brochures to obtain sitters.  Encouraged  them to call [employer] to clarify if meals are available for both parents during this period.  Pt.and family are currently working on Living Will paperwork.  Pt. States his dgtrs. Are aware of his wishes. He is scheduled for CABG today

## 2022-12-05 NOTE — CLINICAL REVIEW
Concurrent review completed, meeting IP. New order obtained from kolby padilla RP. Email sent to BRENDA hanson and FELECIA luke for delivery of IMM. Pending access and md sign.

## 2022-12-05 NOTE — ANESTHESIA PROCEDURE NOTES
Arterial    Diagnosis: CAD    Patient location during procedure: holding area  Procedure start time: 12/5/2022 11:50 AM  Timeout: 12/5/2022 11:50 AM  Procedure end time: 12/5/2022 11:53 AM    Staffing  Authorizing Provider: Artis Gibbs Jr., MD  Performing Provider: Artis Gibbs Jr., MD    Anesthesiologist was present at the time of the procedure.    Preanesthetic Checklist  Completed: patient identified, IV checked, site marked, risks and benefits discussed, surgical consent, monitors and equipment checked, pre-op evaluation, timeout performed and anesthesia consent givenArterial  Skin Prep: alcohol swabs  Local Infiltration: lidocaine  Orientation: left  Location: radial    Catheter Size: 20 G  Catheter placement by Anatomical landmarks. Heme positive aspiration all ports. Insertion Attempts: 1  Assessment  Dressing: secured with tape and tegaderm  Patient: Tolerated well

## 2022-12-05 NOTE — ANESTHESIA PROCEDURE NOTES
Central Line    Diagnosis: CAD  Patient location during procedure: done in OR  Timeout: 12/5/2022 12:36 PM  Procedure end time: 12/5/2022 12:43 PM    Staffing  Authorizing Provider: Artis Gibbs Jr., MD  Performing Provider: Artis Gibbs Jr., MD    Staffing  Performed: anesthesiologist   Anesthesiologist: Artis Gibbs Jr., MD  Anesthesiologist was present at the time of the procedure.  Preanesthetic Checklist  Completed: patient identified, IV checked, site marked, risks and benefits discussed, surgical consent, monitors and equipment checked, pre-op evaluation, timeout performed and anesthesia consent given  Indication   Indication: hemodynamic monitoring, vascular access, med administration     Anesthesia   general anesthesia    Central Line   Skin Prep: skin prepped with ChloraPrep, skin prep agent completely dried prior to procedure  Sterile Barriers Followed: Yes    All five maximal barriers used- gloves, gown, cap, mask, and large sterile sheet    hand hygiene performed prior to central venous catheter insertion  Location: right internal jugular.   Catheter type: double lumen  Catheter Size: 14 Fr  Ultrasound: none      Manometry: Venous cannualation confirmed by visual estimation of blood vessel pressure using manometry.  Insertion Attempts: 1   Securement:line sutured, chlorhexidine patch, sterile dressing applied and blood return through all ports    Post-Procedure        Guidewire Guidewire removed intact. Guidewire removed intact, verified with nurse.

## 2022-12-05 NOTE — TRANSFER OF CARE
"Anesthesia Transfer of Care Note    Patient: Suresh Aragon    Procedure(s) Performed: Procedure(s) (LRB):  CORONARY ARTERY BYPASS GRAFT (CABG) (N/A)    Patient location: ICU    Anesthesia Type: general    Transport from OR: Continuous ECG monitoring in transport. Continuous SpO2 monitoring in transport. Continuos invasive BP monitoring in transport. Transported from OR intubated on 100% O2 by AMBU with assisted ventilation    Post pain: adequate analgesia    Post assessment: no apparent anesthetic complications    Post vital signs: stable    Level of consciousness: sedated    Nausea/Vomiting: no nausea/vomiting    Complications: none    Transfer of care protocol was followed      Last vitals:   Visit Vitals  BP (!) 175/100 (BP Location: Right arm, Patient Position: Sitting)   Pulse 60   Temp 36.7 °C (98.1 °F) (Oral)   Resp 20   Ht 5' 10" (1.778 m)   Wt 73 kg (160 lb 14.4 oz)   SpO2 98%   BMI 23.09 kg/m²     "

## 2022-12-05 NOTE — CONSULTS
[facility]                       [address] [place] [id number]    PATIENT NAME:       RAMON GIRALDO   YOB: 1945  CSN:                [id number]   MRN:                [id number]  ADMIT DATE:         12/01/2022 21:39:00  PHYSICIAN:          Vini Ricks MD                            CONSULTATION    DATE OF CONSULT:  12/03/2022 00:00:00    REASON FOR CONSULTATION:  Possible coronary artery bypass grafting.    HISTORY OF PRESENT ILLNESS:  This is a 77-year-old male, known to Dr. Martinez.  He   presented to the clinic with history of left chest pain radiating to the arm   with exercise.  He was advised to go to the emergency room and workup with a   cardiac cath revealed evidence of severe triple-vessel disease.  I was consulted   for possible intervention.    PAST MEDICAL HISTORY:  Positive for hypertension, hyperlipidemia, GERD, prostate   cancer, appendectomy, prostate surgery.    SOCIAL HISTORY:  No alcohol or smoking.    FAMILY HISTORY:  Coronary artery disease, myocardial infarction, hypertension.    MEDICATION:  Per MAR.    ALLERGIES:  BENADRYL.     REVIEW OF SYSTEMS:  Essentially very positive for chest pain with exertion.  Otherwise negative.    The patient is fairly active.    PHYSICAL EXAMINATION:  GENERAL:  No acute distress.  VITAL SIGNS:  Stable.  NECK:  Trachea midline.  No carotid bruits.  EYES:  EOMI, PERRLA.  LUNGS:  Good air entry bilaterally.  HEART:  Normal S1, S2.    ABDOMEN:  Soft.  EXTREMITIES:  Warm.  PSYCHIATRIC:  Affect is appropriate.  NEUROLOGIC:  2+ motor power.  MUSCULOSKELETAL:  No gross deformity.    DIAGNOSTIC DATABASE:  Results reviewed.  Cardiac cath reviewed, revealed   evidence of severe LAD disease with first diagonal disease, circumflex disease   and PDA disease.  Ejection fraction calculated at 60%.    ASSESSMENT AND PLAN:  Severe triple-vessel coronary artery disease.       The patient will definitely benefit from coronary artery bypass grafting with   targets in the left anterior descending, right coronary artery, and possible   diagonal circumflex.  He understands the risks and benefits of the procedure,   including risks of bleeding, infection, myocardial infarction, stroke, renal   failure, and death.  He completely understands and elected to proceed.      I would like to thank Dr. Martinez for allowing me to participate in the care of   this patient.        ______________________________  MD CARY Tony/KEM  DD:  12/03/2022  Time:  11:52AM  DT:  12/03/2022  Time:  12:38PM  Job #:  [id number]      CONSULTATION

## 2022-12-05 NOTE — H&P
[facility] - Peri Services  Pulmonary Critical Care Note    Patient Name: Suresh Aragon  MRN: [id number]  Admission Date: 12/1/2022  Hospital Length of Stay: 0 days  Code Status: Full Code  Attending Provider: Yair Benson MD  Primary Care Provider: No primary care provider on file.     Subjective:     HPI:   Suresh Aragon is a 77 y.o. male with a hx of HTN, HLD, CAD s/p CABG x3 LIMA-LAD, SVG-D1 & PDA, and GERD. Patient presents to the ER on 12/01/2022 with complaints of chest pain.  Of note patient was just seen in clinic by Dr. Martinez where he was advised to present to ER if had chest pain secondary to unstable angina prior to his scheduled outpatient LHC.  Patient stated the pain started approximately 7 days prior.  He stated the pain feels like a pressure and radiates down to his left arm.  He said the pain is worse with exertion and improves with rest.  He also had associated shortness of breath with exertion.  Initial troponin was negative however repeat troponins are now mildly elevated.  Initial EKG revealed normal sinus rhythm with a left axis deviation and LVH.  [facility] has admitted the patient for unstable angina. He had a LHC on 12/2/22 showing severe multivessel CAD and was referred for CABG. Following successful CABG with 56 minutes on bypass with 34 minutes aortic clamping he was easily taken off but pt was found to have minimal electrical activity and was pacer dependent. He remained stable and intubated and transferred to ICU.    Hospital Course/Significant events:  12.3.22: NAD noted. VSS. SR on tele. Denies CP/SOB/Palps.  Right groin benign.  12.4.22: NAD noted. VSS. SR on tele. Denies CP/SOB/Palps. Awaiting CABG.  15/5/22: s/p CABG, ETT, precedex, epinephrine, paced, insulin gtt at 4, bear hugger, fluids    24 Hour Interval History:  833 ml cell saver given in OR. PRVC 20/500/5/50%. Paced at 80. Epi gtt at 0.02, precedex at 1.    No past medical history on file.    Past Surgical History:    Procedure Laterality Date    ANGIOGRAM, CORONARY, WITH LEFT HEART CATHETERIZATION N/A 12/2/2022    Procedure: Angiogram, Coronary, with Left Heart Cath;  Surgeon: Candido Martinez MD;  Location: [facility] CATH LAB;  Service: Cardiology;  Laterality: N/A;       Social History     Socioeconomic History    Marital status:      Social Determinants of Health     Financial Resource Strain: Low Risk     Difficulty of Paying Living Expenses: Not hard at all   Food Insecurity: No Food Insecurity    Worried About Running Out of Food in the Last Year: Never true    Ran Out of Food in the Last Year: Never true   Transportation Needs: No Transportation Needs    Lack of Transportation (Medical): No    Lack of Transportation (Non-Medical): No   Social Connections: Unknown    Marital Status:    Housing Stability: Unknown    Unable to Pay for Housing in the Last Year: No           Current Outpatient Medications   Medication Instructions    amLODIPine (NORVASC) 5 mg, Oral, Daily    pantoprazole (PROTONIX) 40 mg, Oral, Daily    tadalafiL (CIALIS) 5 mg, Oral, Daily    traMADoL (ULTRAM) 50 mg, Oral, 2 times daily PRN    traZODone (DESYREL) 100 mg, Oral, Nightly       Current Inpatient Medications   acetaminophen  1,000 mg Intravenous Once    atorvastatin  80 mg Oral QHS    cefUROXime sodium        dextrose 5 % in water (D5W)        ketorolac  15 mg Intravenous Q6H    losartan  25 mg Oral Daily    mupirocin   Topical (Top) Daily    nitroGLYCERIN 2% TD oint  2 inch Topical (Top) Q6H    perflutren lipid microspheres  1.3 mL Intravenous Once       Current Intravenous Infusions   clevidipine      dexmedetomidine (PRECEDEX) infusion      electrolyte-A           Review of Systems   Unable to perform ROS: Critical illness        Objective:       Intake/Output Summary (Last 24 hours) at 12/5/2022 1620  Last data filed at 12/5/2022 1559  Gross per 24 hour   Intake 3124 ml   Output 175 ml   Net 2949 ml         Vital Signs (Most  Recent):  Temp: 98.1 °F (36.7 °C) (12/05/22 0707)  Pulse: 60 (12/05/22 1130)  Resp: 20 (12/05/22 1130)  BP: (!) 175/100 (12/05/22 1130)  SpO2: 98 % (12/05/22 1130)    Body mass index is 23.09 kg/m².  Weight: 73 kg (160 lb 14.4 oz) Vital Signs (24h Range):  Temp:  [98.1 °F (36.7 °C)-98.2 °F (36.8 °C)] 98.1 °F (36.7 °C)  Pulse:  [60-82] 60  Resp:  [18-21] 20  SpO2:  [95 %-98 %] 98 %  BP: (144-175)/() 175/100     Physical Exam  Constitutional:       General: He is not in acute distress.     Appearance: He is not ill-appearing, toxic-appearing or diaphoretic.      Comments: Intubated and sedated   HENT:      Head: Normocephalic and atraumatic.      Mouth/Throat:      Mouth: Mucous membranes are moist.   Eyes:      General: No scleral icterus.     Pupils: Pupils are equal, round, and reactive to light.   Neck:      Vascular: No JVD.   Cardiovascular:      Rate and Rhythm: Normal rate.      Pulses: Normal pulses.      Heart sounds: No murmur heard.    Friction rub present. No gallop.   Pulmonary:      Effort: No respiratory distress.      Breath sounds: No wheezing, rhonchi or rales.      Comments: 8 ETT 22 at the lip. Mediastinal CTs and pacer wire in place, no air leak on CT.  Abdominal:      General: Abdomen is flat. There is no distension.      Palpations: Abdomen is soft.      Tenderness: There is no abdominal tenderness. There is no guarding or rebound.   Musculoskeletal:      Cervical back: No rigidity.      Right lower leg: No edema.      Left lower leg: No edema.   Skin:     General: Skin is warm and dry.      Coloration: Skin is not pale.      Comments: Sternal dressing in place, clean intact and dry, ace wrap on LLE clean and dry.   Neurological:      Comments: Intubated and sedated RASS -3         Lines/Drains/Airways       Central Venous Catheter Line  Duration             Percutaneous Central Line Insertion/Assessment - Double Lumen  12/05/22 1246 right internal jugular <1 day              Drain   Duration                  Chest Tube 12/05/22 1441 Tube - 1 Right Mediastinal 28 Fr. <1 day         Chest Tube 12/05/22 1456 Tube - 2 Left Mediastinal 28 Fr. <1 day         Chest Tube 12/05/22 1458 Tube - 3 Left Midaxillary 28 Fr. <1 day         Urethral Catheter 12/05/22 1235 Non-latex 14 Fr. <1 day              Airway  Duration                  Airway - Non-Surgical 12/05/22 1235 <1 day              Arterial Line  Duration             Arterial Line 12/05/22 1150 Left Radial <1 day              Peripheral Intravenous Line  Duration                  Peripheral IV - Single Lumen 12/01/22 2215 20 G Left Antecubital 3 days                    Significant Labs:    Lab Results   Component Value Date    WBC 18.2 (H) 12/05/2022    HGB 10.4 (L) 12/05/2022    HCT 23 (L) 12/05/2022    MCV 97.1 (H) 12/05/2022     (L) 12/05/2022         BMP  Lab Results   Component Value Date     12/04/2022    K 3.8 12/04/2022    CO2 22 (L) 12/04/2022    BUN 11.8 12/04/2022    CREATININE 0.84 12/04/2022    CALCIUM 8.4 (L) 12/04/2022    EGFRNONAA >60 08/18/2021       ABG  Recent Labs   Lab 12/05/22  1418   PH 7.450   PO2 239*   PCO2 34.8*   HCO3 24.2   BE 0       Mechanical Ventilation Support:       Significant Imaging:  I have reviewed the pertinent imaging within the past 24 hours.        Assessment/Plan:     Assessment  1. CAD s/p CABG x3  with left internal mammary artery to the left anterior descending, saphenous venous graft to diagonal, and saphenous venous graft to posterior descending artery.  2. Pacer dependent in OR  3. PMH of recent NSTEMI, HFpEF (50%), HTN, HLD, GERD    Plan  - Admitted to ICU for ongoing monitoring post-CABG  - MV management per [facility] NET protocol  - Chest tube management per CVTS  - F/U AM labs and imaging   - Wean Epi and insulin drips     DVT Prophylaxis: Will defer to CT surgery team    GI Prophylaxis: Will defer to CT surgery team      32 minutes of critical care was time spent personally by me on  the following activities: development of treatment plan with patient or surrogate and bedside caregivers, discussions with consultants, evaluation of patient's response to treatment, examination of patient, ordering and performing treatments and interventions, ordering and review of laboratory studies, ordering and review of radiographic studies, pulse oximetry, re-evaluation of patient's condition.  This critical care time did not overlap with that of any other provider or involve time for any procedures.     Ophelia Washington MD  Pulmonary Critical Care Medicine  [facility] - Periop Services

## 2022-12-05 NOTE — PROGRESS NOTES
[facility] - Observation Unit  Cardiology  Progress Note    Patient Name: Suresh Aragon  MRN: [id number]  Admission Date: 12/1/2022  Hospital Length of Stay: 0 days  Code Status: Full Code   Attending Physician: Yair Benson MD   Primary Care Physician: No primary care provider on file.  Expected Discharge Date:   Principal Problem:Unstable angina    Subjective:     Brief HPI:   This is a 77-year-old male, who is known to Dr. Martinez, with a history of HTN, HLD, GERD, angina.  Patient presents to the ER on 12/01/2022 with complaints of chest pain.  Of note patient was just seen in clinic by Dr. Martinez where he was advised to present to ER if had chest pain secondary to unstable angina prior to his scheduled outpatient LHC.  Patient stated the pain started approximately 7 days prior.  He stated the pain feels like a pressure and radiates down to his left arm.  He said the pain is worse with exertion and improves with rest.  He also had associated shortness of breath with exertion.  Initial troponin was negative however repeat troponins are now mildly elevated.  Initial EKG revealed normal sinus rhythm with a left axis deviation and LVH.  [facility] has admitted the patient for unstable angina.     Hospital Course:   12.3.22: NAD noted. VSS. SR on tele. Denies CP/SOB/Palps.  Right groin benign.  12.4.22: NAD noted. VSS. SR on tele. Denies CP/SOB/Palps. Awaiting CABG.  12.5.22: NAD noted. VSS. SR on tele. NPO for CABG today.    PMH: HTN, HLD, GERD, angina, Prostate cancer  PSH:   appendectomy, prostate surgery  Social History:  Denies EtOH, tobacco, and illicit drug use  Family History:  Father-CAD, MI, HTN     Previous Cardiac Diagnostics:   Echo 12.3.22  Technically difficult study with limited visualization.  Definity used.    The estimated ejection fraction is 50%.    Left ventricle is normal in size with low-normal systolic function.    Grade 1 left ventricular diastolic dysfunction.    Carotid US 12.3.22  The  right internal carotid artery demonstrated no hemodynamically significant stenosis   Left internal carotid artery demonstrated less than 50% stenosis.    Bilateral vertebral arteries were patent with antegrade flow    [facility] 12.2.22  1. LEFT MAIN:  MILD CALCIFICATION BUT NO OBSTRUCTIVE DISEASE.  2. LAD:  THE LAD SHOWS HEAVY CALCIFICATION PROXIMALLY.  THERE IS EVIDENCE OF A 95% STENOSIS PROXIMALLY INVOLVING THE 1ST DIAGONAL.  THE 1ST DIAGONAL SHOWS 95% STENOSIS PROXIMALLY.  THE MID LAD SHOWS 90% STENOSIS.  3. LEFT CIRCUMFLEX:  90% PROXIMAL STENOSIS IS NOTED  4. RIGHT CORONARY ARTERY:  THE RIGHT CORONARY ARTERY SHOWS A LONG, VERY DISTAL 90% LESION WHICH COMPROMISES THE FLOW INTO THE PDA     GRAFT ANGIOGRAPHY:  1. LIMA TO THE LEFT SIDE OF THE CHEST WALL: WIDELY PATENT WITH NO DISEASE  2.  MECHE TO THE RIGHT SIDE OF THE CHEST WALL: WIDELY PATENT WITH NO DISEASE          Review of Systems   Constitutional: Negative for chills and fever.   Cardiovascular:  Negative for chest pain and palpitations.   Respiratory:  Negative for shortness of breath.    Psychiatric/Behavioral:  Negative for altered mental status.          Objective:     Vital Signs (Most Recent):  Temp: 98.1 °F (36.7 °C) (12/05/22 0707)  Pulse: 68 (12/05/22 0707)  Resp: (!) 21 (12/05/22 0707)  BP: (!) 155/82 (12/05/22 0707)  SpO2: 96 % (12/05/22 0707) Vital Signs (24h Range):  Temp:  [98.1 °F (36.7 °C)-98.4 °F (36.9 °C)] 98.1 °F (36.7 °C)  Pulse:  [60-82] 68  Resp:  [18-21] 21  SpO2:  [95 %-98 %] 96 %  BP: (122-167)/(53-83) 155/82     Weight: 73 kg (160 lb 14.4 oz)  Body mass index is 23.09 kg/m².    SpO2: 96 %  O2 Device (Oxygen Therapy): nasal cannula      Intake/Output Summary (Last 24 hours) at 12/5/2022 0802  Last data filed at 12/4/2022 1739  Gross per 24 hour   Intake 591 ml   Output --   Net 591 ml         Lines/Drains/Airways       Peripheral Intravenous Line  Duration                  Peripheral IV - Single Lumen 12/01/22 2215 20 G Left Antecubital 3  days                    Significant Labs: CMP   Recent Labs   Lab 12/04/22  0037      K 3.8   CO2 22*   BUN 11.8   CREATININE 0.84   CALCIUM 8.4*   ALBUMIN 3.7   BILITOT 1.3   ALKPHOS 63   AST 17   ALT 14     , CBC   Recent Labs   Lab 12/04/22  0037   WBC 10.8   HGB 13.2*   HCT 39.0*        , and Troponin   No results for input(s): TROPONINI in the last 48 hours.      Telemetry:  SR    Physical Exam:  Physical Exam  Constitutional:       Appearance: Normal appearance.   HENT:      Head: Normocephalic.   Eyes:      Extraocular Movements: Extraocular movements intact.      Conjunctiva/sclera: Conjunctivae normal.   Cardiovascular:      Rate and Rhythm: Normal rate and regular rhythm.      Pulses: Normal pulses.      Heart sounds: Normal heart sounds.      Comments: R Groin Soft/Flat, Non-Tender, No Sign of Bleed/Infection. +2 BLE Palpable Pedal Pulses    Pulmonary:      Effort: Pulmonary effort is normal.      Breath sounds: Normal breath sounds.   Abdominal:      Palpations: Abdomen is soft.   Musculoskeletal:         General: Normal range of motion.      Cervical back: Neck supple.   Skin:     General: Skin is warm and dry.   Neurological:      Mental Status: He is alert and oriented to person, place, and time. Mental status is at baseline.   Psychiatric:         Mood and Affect: Mood normal.         Behavior: Behavior normal.       Current Inpatient Medications:    Current Facility-Administered Medications:     aspirin chewable tablet 81 mg, 81 mg, Oral, Daily, Dale Cordoba MD, 81 mg at 12/04/22 0903    atorvastatin tablet 80 mg, 80 mg, Oral, QHS, Candido Martinez MD, 80 mg at 12/04/22 2131    cefazolin (ANCEF) 2 gram in dextrose 5% 50 mL IVPB (premix), 2 g, Intravenous, On Call Procedure, MARISOL Brown    fentaNYL injection, , , PRN, Candido Martinez MD, 50 mcg at 12/02/22 1724    heparin 25,000 units in dextrose 5% (100 units/ml) IV bolus from bag - ADDITIONAL PRN BOLUS - 30 units/kg (max  bolus 4000 units), 30 Units/kg (Adjusted), Intravenous, PRN, Candido Martinez MD    heparin 25,000 units in dextrose 5% (100 units/ml) IV bolus from bag - ADDITIONAL PRN BOLUS - 60 units/kg (max bolus 4000 units), 53.3 Units/kg (Adjusted), Intravenous, PRN, Candido Martinez MD    heparin 25,000 units in dextrose 5% 250 mL (100 units/mL) infusion LOW INTENSITY nomogram - LAF, 20 Units/kg/hr (Adjusted), Intravenous, Continuous, Candido Martinez MD, Stopped at 12/05/22 0658    iopamidoL (ISOVUE-370) injection, , , PRN, Candido Martinez MD, 100 mL at 12/02/22 1747    LIDOcaine HCL 10 mg/ml (1%) injection, , , PRN, Candido Martinez MD, 10 mL at 12/02/22 1725    losartan tablet 25 mg, 25 mg, Oral, Daily, Candido Martinez MD, 25 mg at 12/04/22 0903    melatonin tablet 6 mg, 6 mg, Oral, Nightly PRN, Dale Cordoba MD, 6 mg at 12/04/22 2359    metoprolol tartrate (LOPRESSOR) tablet 25 mg, 25 mg, Oral, BID, Candido Martinez MD, 25 mg at 12/04/22 2131    midazolam (VERSED) 1 mg/mL injection, , , PRN, Candido Martinez MD, 1 mg at 12/02/22 1724    mupirocin 2 % ointment, , Nasal, On Call Procedure, MARISOL Brown    mupirocin 2 % ointment, , Topical (Top), Daily, Yair Benson MD, Given at 12/04/22 1206    nitroGLYCERIN 2% TD oint ointment 2 inch, 2 inch, Topical (Top), Q6H, Candido Martinez MD, 2 inch at 12/04/22 1206    nitroGLYCERIN SL tablet, , , PRN, Candido Martinez MD, 0.1 mg at 12/02/22 1754    perflutren lipid microspheres injection 1.3 mL, 1.3 mL, Intravenous, Once, Yair Benson MD    sodium chloride 0.9% flush 10 mL, 10 mL, Intravenous, PRN, Dale Cordoba MD        Assessment:     IMPRESSION:  NSTEMI type I  MV CAD  Angina  HTN  HLD  GERD  No Hx of GI Bleed      Plan:     PLAN:  Continue ASA, high intensity statin, ARB, BB  CABG per CT Sx  Right groin precautions/activity restrictions        Nehemias Santos, [facility]-BC  Cardiology  [facility] - Observation Unit  12/05/2022

## 2022-12-06 LAB
ANION GAP SERPL CALC-SCNC: 7 MEQ/L
BASOPHILS # BLD AUTO: 0.03 X10(3)/MCL (ref 0–0.2)
BASOPHILS NFR BLD AUTO: 0.3 %
BSA FOR ECHO PROCEDURE: 1.96 M2
BUN SERPL-MCNC: 11.4 MG/DL (ref 8.4–25.7)
CALCIUM SERPL-MCNC: 7 MG/DL (ref 8.8–10)
CHLORIDE SERPL-SCNC: 111 MMOL/L (ref 98–107)
CO2 SERPL-SCNC: 21 MMOL/L (ref 23–31)
CREAT SERPL-MCNC: 0.84 MG/DL (ref 0.73–1.18)
CREAT/UREA NIT SERPL: 14
EJECTION FRACTION: 30 %
EOSINOPHIL # BLD AUTO: 0 X10(3)/MCL (ref 0–0.9)
EOSINOPHIL NFR BLD AUTO: 0 %
ERYTHROCYTE [DISTWIDTH] IN BLOOD BY AUTOMATED COUNT: 13.3 % (ref 11.5–17)
GFR SERPLBLD CREATININE-BSD FMLA CKD-EPI: >60 MLS/MIN/1.73/M2
GLUCOSE SERPL-MCNC: 168 MG/DL (ref 82–115)
GLUCOSE SERPL-MCNC: 184 MG/DL (ref 70–110)
HCO3 UR-SCNC: 24.8 MMOL/L (ref 24–28)
HCT VFR BLD AUTO: 29.9 % (ref 42–52)
HCT VFR BLD CALC: 27 %PCV (ref 36–54)
HGB BLD-MCNC: 10.2 GM/DL (ref 14–18)
HGB BLD-MCNC: 9 G/DL
IMM GRANULOCYTES # BLD AUTO: 0.03 X10(3)/MCL (ref 0–0.04)
IMM GRANULOCYTES NFR BLD AUTO: 0.3 %
LYMPHOCYTES # BLD AUTO: 1.01 X10(3)/MCL (ref 0.6–4.6)
LYMPHOCYTES NFR BLD AUTO: 10.3 %
MCH RBC QN AUTO: 31.9 PG (ref 27–31)
MCHC RBC AUTO-ENTMCNC: 34.1 MG/DL (ref 33–36)
MCV RBC AUTO: 93.4 FL (ref 80–94)
MONOCYTES # BLD AUTO: 1.22 X10(3)/MCL (ref 0.1–1.3)
MONOCYTES NFR BLD AUTO: 12.5 %
NEUTROPHILS # BLD AUTO: 7.5 X10(3)/MCL (ref 2.1–9.2)
NEUTROPHILS NFR BLD AUTO: 76.6 %
NRBC BLD AUTO-RTO: 0 %
PCO2 BLDA: 43.1 MMHG (ref 35–45)
PH SMN: 7.37 [PH] (ref 7.35–7.45)
PLATELET # BLD AUTO: 83 X10(3)/MCL (ref 130–400)
PMV BLD AUTO: 10 FL (ref 7.4–10.4)
PO2 BLDA: 430 MMHG (ref 80–100)
POC BE: 0 MMOL/L
POC IONIZED CALCIUM: 1.28 MMOL/L (ref 1.06–1.42)
POC SATURATED O2: 100 % (ref 95–100)
POC TCO2: 26 MMOL/L (ref 23–27)
POCT GLUCOSE: 111 MG/DL (ref 70–110)
POCT GLUCOSE: 126 MG/DL (ref 70–110)
POCT GLUCOSE: 130 MG/DL (ref 70–110)
POCT GLUCOSE: 137 MG/DL (ref 70–110)
POCT GLUCOSE: 143 MG/DL (ref 70–110)
POCT GLUCOSE: 157 MG/DL (ref 70–110)
POCT GLUCOSE: 179 MG/DL (ref 70–110)
POCT GLUCOSE: 194 MG/DL (ref 70–110)
POCT GLUCOSE: 99 MG/DL (ref 70–110)
POTASSIUM BLD-SCNC: 3.7 MMOL/L (ref 3.5–5.1)
POTASSIUM SERPL-SCNC: 4 MMOL/L (ref 3.5–5.1)
RBC # BLD AUTO: 3.2 X10(6)/MCL (ref 4.7–6.1)
SAMPLE: ABNORMAL
SODIUM BLD-SCNC: 139 MMOL/L (ref 136–145)
SODIUM SERPL-SCNC: 139 MMOL/L (ref 136–145)
TROPONIN I SERPL-MCNC: 2.1 NG/ML (ref 0–0.04)
TROPONIN I SERPL-MCNC: 2.59 NG/ML (ref 0–0.04)
TROPONIN I SERPL-MCNC: 4 NG/ML (ref 0–0.04)
WBC # SPEC AUTO: 9.8 X10(3)/MCL (ref 4.5–11.5)

## 2022-12-06 PROCEDURE — 36415 COLL VENOUS BLD VENIPUNCTURE: CPT

## 2022-12-06 PROCEDURE — 93010 ELECTROCARDIOGRAM REPORT: CPT | Mod: ,,, | Performed by: INTERNAL MEDICINE

## 2022-12-06 PROCEDURE — 80048 BASIC METABOLIC PNL TOTAL CA: CPT

## 2022-12-06 PROCEDURE — 85025 COMPLETE CBC W/AUTO DIFF WBC: CPT

## 2022-12-06 PROCEDURE — S5010 5% DEXTROSE AND 0.45% SALINE: HCPCS

## 2022-12-06 PROCEDURE — 63600175 PHARM REV CODE 636 W HCPCS: Performed by: INTERNAL MEDICINE

## 2022-12-06 PROCEDURE — 63600175 PHARM REV CODE 636 W HCPCS

## 2022-12-06 PROCEDURE — 93005 ELECTROCARDIOGRAM TRACING: CPT

## 2022-12-06 PROCEDURE — 84484 ASSAY OF TROPONIN QUANT: CPT | Performed by: NURSE PRACTITIONER

## 2022-12-06 PROCEDURE — 25000003 PHARM REV CODE 250

## 2022-12-06 PROCEDURE — 93010 EKG 12-LEAD: ICD-10-PCS | Mod: ,,, | Performed by: INTERNAL MEDICINE

## 2022-12-06 PROCEDURE — P9045 ALBUMIN (HUMAN), 5%, 250 ML: HCPCS | Mod: JG

## 2022-12-06 PROCEDURE — 36415 COLL VENOUS BLD VENIPUNCTURE: CPT | Performed by: NURSE PRACTITIONER

## 2022-12-06 PROCEDURE — 25000003 PHARM REV CODE 250: Performed by: INTERNAL MEDICINE

## 2022-12-06 PROCEDURE — 94799 UNLISTED PULMONARY SVC/PX: CPT

## 2022-12-06 PROCEDURE — 20000000 HC ICU ROOM

## 2022-12-06 RX ORDER — ONDANSETRON 2 MG/ML
INJECTION INTRAMUSCULAR; INTRAVENOUS
Status: DISPENSED
Start: 2022-12-06 | End: 2022-12-06

## 2022-12-06 RX ORDER — PROMETHAZINE HYDROCHLORIDE 25 MG/ML
25 INJECTION, SOLUTION INTRAMUSCULAR; INTRAVENOUS EVERY 4 HOURS PRN
Status: DISCONTINUED | OUTPATIENT
Start: 2022-12-06 | End: 2022-12-15 | Stop reason: HOSPADM

## 2022-12-06 RX ADMIN — ALUMINUM HYDROXIDE, MAGNESIUM HYDROXIDE, AND SIMETHICONE 30 ML: 200; 200; 20 SUSPENSION ORAL at 06:12

## 2022-12-06 RX ADMIN — KETOROLAC TROMETHAMINE 15 MG: 30 INJECTION, SOLUTION INTRAMUSCULAR at 11:12

## 2022-12-06 RX ADMIN — FOLIC ACID 1 MG: 1 TABLET ORAL at 08:12

## 2022-12-06 RX ADMIN — PROMETHAZINE HYDROCHLORIDE 25 MG: 25 INJECTION INTRAMUSCULAR; INTRAVENOUS at 10:12

## 2022-12-06 RX ADMIN — KETOROLAC TROMETHAMINE 15 MG: 30 INJECTION, SOLUTION INTRAMUSCULAR at 06:12

## 2022-12-06 RX ADMIN — CEFAZOLIN SODIUM 2 G: 2 SOLUTION INTRAVENOUS at 06:12

## 2022-12-06 RX ADMIN — ALBUMIN (HUMAN) 12.5 G: 2.5 SOLUTION INTRAVENOUS at 07:12

## 2022-12-06 RX ADMIN — SUCRALFATE 1 G: 1 TABLET ORAL at 06:12

## 2022-12-06 RX ADMIN — KETOROLAC TROMETHAMINE 15 MG: 30 INJECTION, SOLUTION INTRAMUSCULAR at 01:12

## 2022-12-06 RX ADMIN — KETOROLAC TROMETHAMINE 15 MG: 30 INJECTION, SOLUTION INTRAMUSCULAR at 05:12

## 2022-12-06 RX ADMIN — OXYCODONE 10 MG: 5 TABLET ORAL at 02:12

## 2022-12-06 RX ADMIN — ONDANSETRON 4 MG: 2 INJECTION INTRAMUSCULAR; INTRAVENOUS at 09:12

## 2022-12-06 RX ADMIN — SUCRALFATE 1 G: 1 TABLET ORAL at 11:12

## 2022-12-06 RX ADMIN — MORPHINE SULFATE 4 MG: 4 INJECTION INTRAVENOUS at 12:12

## 2022-12-06 RX ADMIN — MUPIROCIN: 20 OINTMENT TOPICAL at 09:12

## 2022-12-06 RX ADMIN — ATORVASTATIN CALCIUM 80 MG: 40 TABLET, FILM COATED ORAL at 09:12

## 2022-12-06 RX ADMIN — ENOXAPARIN SODIUM 40 MG: 40 INJECTION SUBCUTANEOUS at 05:12

## 2022-12-06 RX ADMIN — DOCUSATE SODIUM 100 MG: 100 CAPSULE, LIQUID FILLED ORAL at 08:12

## 2022-12-06 RX ADMIN — SUCRALFATE 1 G: 1 TABLET ORAL at 05:12

## 2022-12-06 RX ADMIN — DEXTROSE AND SODIUM CHLORIDE: 5; 450 INJECTION, SOLUTION INTRAVENOUS at 06:12

## 2022-12-06 RX ADMIN — ONDANSETRON 4 MG: 2 INJECTION INTRAMUSCULAR; INTRAVENOUS at 04:12

## 2022-12-06 RX ADMIN — DOCUSATE SODIUM 100 MG: 100 CAPSULE, LIQUID FILLED ORAL at 09:12

## 2022-12-06 RX ADMIN — FAMOTIDINE 20 MG: 10 INJECTION, SOLUTION INTRAVENOUS at 08:12

## 2022-12-06 RX ADMIN — KETOROLAC TROMETHAMINE 15 MG: 30 INJECTION, SOLUTION INTRAMUSCULAR at 12:12

## 2022-12-06 NOTE — PROGRESS NOTES
Pulmonary Critical Care   Progress note    Patient Name: Suresh Aragon  MRN: [id number]  Admission Date: 12/1/2022  Hospital Length of Stay: 1 days  Code Status: Full Code  Attending Provider: Yair Benson MD  Primary Care Provider: No primary care provider on file.     Subjective:     HPI:   Suresh Aragon is a 77 y.o. male with a hx of HTN, HLD, CAD s/p CABG x3 LIMA-LAD, SVG-D1 & PDA, and GERD. Patient presents to the ER on 12/01/2022 with complaints of chest pain.  Of note patient was just seen in clinic by Dr. Martinez where he was advised to present to ER if had chest pain secondary to unstable angina prior to his scheduled outpatient LHC.  Patient stated the pain started approximately 7 days prior.  He stated the pain feels like a pressure and radiates down to his left arm.  He said the pain is worse with exertion and improves with rest.  He also had associated shortness of breath with exertion.  Initial troponin was negative however repeat troponins are now mildly elevated.  Initial EKG revealed normal sinus rhythm with a left axis deviation and LVH.  [facility] has admitted the patient for unstable angina. He had a LHC on 12/2/22 showing severe multivessel CAD and was referred for CABG. Following successful CABG with 56 minutes on bypass with 34 minutes aortic clamping he was easily taken off but pt was found to have minimal electrical activity and was pacer dependent. He remained stable and intubated and transferred to ICU.    Hospital Course/Significant events:  12.3.22: NAD noted. VSS. SR on tele. Denies CP/SOB/Palps.  Right groin benign.  12.4.22: NAD noted. VSS. SR on tele. Denies CP/SOB/Palps. Awaiting CABG.  12/5/22: s/p CABG, ETT, precedex, epinephrine, paced, insulin gtt at 4, bear hugger, fluids  Extubated on 12/5    24 Hour Interval History:  No acute events overnight. Satting well. VSS.    No past medical history on file.    Past Surgical History:   Procedure Laterality Date    ANGIOGRAM, CORONARY, WITH LEFT  HEART CATHETERIZATION N/A 12/2/2022    Procedure: Angiogram, Coronary, with Left Heart Cath;  Surgeon: Candido Martinez MD;  Location: [facility] CATH LAB;  Service: Cardiology;  Laterality: N/A;       Social History     Socioeconomic History    Marital status:      Social Determinants of Health     Financial Resource Strain: Low Risk     Difficulty of Paying Living Expenses: Not hard at all   Food Insecurity: No Food Insecurity    Worried About Running Out of Food in the Last Year: Never true    Ran Out of Food in the Last Year: Never true   Transportation Needs: No Transportation Needs    Lack of Transportation (Medical): No    Lack of Transportation (Non-Medical): No   Social Connections: Unknown    Marital Status:    Housing Stability: Unknown    Unable to Pay for Housing in the Last Year: No           Current Outpatient Medications   Medication Instructions    amLODIPine (NORVASC) 5 mg, Oral, Daily    pantoprazole (PROTONIX) 40 mg, Oral, Daily    tadalafiL (CIALIS) 5 mg, Oral, Daily    traMADoL (ULTRAM) 50 mg, Oral, 2 times daily PRN    traZODone (DESYREL) 100 mg, Oral, Nightly       Current Inpatient Medications   aluminum-magnesium hydroxide-simethicone  30 mL Oral QID (AC & HS)    amLODIPine  5 mg Oral Daily    aspirin  81 mg Oral Daily    atorvastatin  80 mg Oral QHS    ceFAZolin (ANCEF) IVPB  2 g Intravenous Q12H    docusate sodium  100 mg Oral BID    enoxaparin  40 mg Subcutaneous Daily    famotidine (PF)  20 mg Intravenous Daily    folic acid  1 mg Oral Daily    ketorolac  15 mg Intravenous Q6H    losartan  25 mg Oral Daily    metoprolol tartrate  12.5 mg Oral BID    mupirocin   Topical (Top) Daily    mupirocin   Nasal BID    nitroGLYCERIN 2% TD oint  2 inch Topical (Top) Q6H    ondansetron        perflutren lipid microspheres  1.3 mL Intravenous Once    sucralfate  1 g Oral Q6H       Current Intravenous Infusions   clevidipine Stopped (12/05/22 1700)    dexmedetomidine (PRECEDEX) infusion  Stopped (12/05/22 2200)    dextrose 5 % and 0.45 % NaCl 100 mL/hr at 12/05/22 1738    electrolyte-A      EPINEPHrine 0.01 mcg/kg/min (12/05/22 2300)    insulin regular 1 units/mL infusion orderable (CTS POST-OP) 3 Units/hr (12/06/22 0000)    loperamide           Objective:       Intake/Output Summary (Last 24 hours) at 12/6/2022 0449  Last data filed at 12/6/2022 0300  Gross per 24 hour   Intake 6333 ml   Output 3050 ml   Net 3283 ml           Vital Signs (Most Recent):  Temp: 97.7 °F (36.5 °C) (12/06/22 0000)  Pulse: 98 (12/06/22 0330)  Resp: 17 (12/06/22 0330)  BP: 118/64 (12/06/22 0330)  SpO2: 98 % (12/06/22 0330)    Body mass index is 23.09 kg/m².  Weight: 73 kg (160 lb 14.4 oz) Vital Signs (24h Range):  Temp:  [97.7 °F (36.5 °C)-98.2 °F (36.8 °C)] 97.7 °F (36.5 °C)  Pulse:  [60-98] 98  Resp:  [9-24] 17  SpO2:  [95 %-100 %] 98 %  BP: ()/() 118/64     Physical Exam  Constitutional:       General: He is not in acute distress.     Appearance: He is not ill-appearing, toxic-appearing or diaphoretic.      Comments: Intubated and sedated   HENT:      Head: Normocephalic and atraumatic.      Mouth/Throat:      Mouth: Mucous membranes are moist.   Eyes:      General: No scleral icterus.     Pupils: Pupils are equal, round, and reactive to light.   Neck:      Vascular: No JVD.   Cardiovascular:      Rate and Rhythm: Normal rate.      Pulses: Normal pulses.      Heart sounds: No murmur heard.    Friction rub present. No gallop.   Pulmonary:      Effort: No respiratory distress.      Breath sounds: No wheezing, rhonchi or rales.      Comments: Mediastinal CTs and pacer wire in place, no air leak on CT.  Abdominal:      General: Abdomen is flat. There is no distension.      Palpations: Abdomen is soft.      Tenderness: There is no abdominal tenderness. There is no guarding or rebound.   Musculoskeletal:      Cervical back: No rigidity.      Right lower leg: No edema.      Left lower leg: No edema.   Skin:      General: Skin is warm and dry.      Coloration: Skin is not pale.      Comments: Sternal dressing in place, clean intact and dry, ace wrap on LLE clean and dry.   Neurological:      General: No focal deficit present.      Mental Status: He is oriented to person, place, and time.         Lines/Drains/Airways       Central Venous Catheter Line  Duration             Percutaneous Central Line Insertion/Assessment - Double Lumen  12/05/22 1246 right internal jugular <1 day              Drain  Duration                  Chest Tube 12/05/22 1441 Tube - 1 Right Mediastinal 28 Fr. <1 day         Chest Tube 12/05/22 1456 Tube - 2 Left Mediastinal 28 Fr. <1 day         Chest Tube 12/05/22 1458 Tube - 3 Left Midaxillary 28 Fr. <1 day         Urethral Catheter 12/05/22 1235 Non-latex 14 Fr. <1 day              Airway  Duration                  Airway - Non-Surgical 12/05/22 1235 <1 day              Arterial Line  Duration             Arterial Line 12/05/22 1150 Left Radial <1 day              Peripheral Intravenous Line  Duration                  Peripheral IV - Single Lumen 12/01/22 2215 20 G Left Antecubital 4 days                    Significant Labs:    Lab Results   Component Value Date    WBC 9.8 12/06/2022    HGB 10.2 (L) 12/06/2022    HCT 29.9 (L) 12/06/2022    MCV 93.4 12/06/2022    PLT 83 (L) 12/06/2022         BMP  Lab Results   Component Value Date     12/06/2022    K 4.0 12/06/2022    CO2 21 (L) 12/06/2022    BUN 11.4 12/06/2022    CREATININE 0.84 12/06/2022    CALCIUM 7.0 (L) 12/06/2022    EGFRNONAA >60 08/18/2021       ABG  Recent Labs   Lab 12/05/22  1418 12/05/22  1719 12/05/22 2104   PH 7.450   < > 7.35   PO2 239*   < > 144*   PCO2 34.8*   < > 39   HCO3 24.2   < > 21.5   BE 0  --   --     < > = values in this interval not displayed.         Mechanical Ventilation Support:  Vent Mode: CPAP PSV (12/05/22 2102)  Set Rate: 8 BPM (12/05/22 1959)  Vt Set: 500 mL (12/05/22 1959)  Pressure Support: 10 cmH20  (12/05/22 2102)  PEEP/CPAP: 5 cmH20 (12/05/22 2102)  Oxygen Concentration (%): 30 (12/05/22 2102)  Peak Airway Pressure: 16 cmH20 (12/05/22 2102)  Total Ve: 9.1 L/m (12/05/22 2102)  F/VT Ratio<105 (RSBI): (!) 21.74 (12/05/22 2102)    Significant Imaging:  I have reviewed the pertinent imaging within the past 24 hours.        Assessment/Plan:     Assessment  1. CAD s/p CABG x3  with left internal mammary artery to the left anterior descending, saphenous venous graft to diagonal, and saphenous venous graft to posterior descending artery.  2. Pacer dependent in OR  3. PMH of recent NSTEMI, HFpEF (50%), HTN, HLD, GERD    Plan  - Admitted to ICU for ongoing monitoring post-CABG  - Chest tube management per CVTS  - F/U AM labs and imaging   - Wean Epi and insulin drips     DVT Prophylaxis: Will defer to CT surgery team    GI Prophylaxis: Will defer to CT surgery team      32 minutes of critical care was time spent personally by me on the following activities: development of treatment plan with patient or surrogate and bedside caregivers, discussions with consultants, evaluation of patient's response to treatment, examination of patient, ordering and performing treatments and interventions, ordering and review of laboratory studies, ordering and review of radiographic studies, pulse oximetry, re-evaluation of patient's condition.  This critical care time did not overlap with that of any other provider or involve time for any procedures.     Jayna Carrera MD  Pulmonary Critical Care Medicine  [facility] - Periop Services

## 2022-12-06 NOTE — PROGRESS NOTES
[facility] - Observation Unit  Cardiology  Progress Note    Patient Name: Suresh Aragon  MRN: [id number]  Admission Date: 12/1/2022  Hospital Length of Stay: 1 days  Code Status: Full Code   Attending Physician: Yair Benson MD   Primary Care Physician: No primary care provider on file.  Expected Discharge Date:   Principal Problem:Unstable angina    Subjective:     Brief HPI:   This is a 77-year-old male, who is known to Dr. Martinez, with a history of HTN, HLD, GERD, angina.  Patient presents to the ER on 12/01/2022 with complaints of chest pain.  Of note patient was just seen in clinic by Dr. Martinez where he was advised to present to ER if had chest pain secondary to unstable angina prior to his scheduled outpatient LHC.  Patient stated the pain started approximately 7 days prior.  He stated the pain feels like a pressure and radiates down to his left arm.  He said the pain is worse with exertion and improves with rest.  He also had associated shortness of breath with exertion.  Initial troponin was negative however repeat troponins are now mildly elevated.  Initial EKG revealed normal sinus rhythm with a left axis deviation and LVH.  [facility] has admitted the patient for unstable angina.     Hospital Course:   12.3.22: NAD noted. VSS. SR on tele. Denies CP/SOB/Palps.  Right groin benign.  12.4.22: NAD noted. VSS. SR on tele. Denies CP/SOB/Palps. Awaiting CABG.  12.5.22: NAD noted. VSS. SR on tele. NPO for CABG today.  12.6.22:  Upon rounds this a.m. patient was in junctional rhythm with heart rate in the 90s.  Patient then stated he felt anxious and had sinus arrest with asystole.  Code was activated.  Patient was placed back in bed once patient in bed he recovered spontaneously without CPR or epinephrine.  Patient was placed back on external pacing.  Rate was attempted to be turned down to see underlying rhythm however patient continued to require pacing even pacer is lowest rate.  Upon recovery patient began  vomiting and complained of feeling flushed.  Vital signs stable, no vasopressors required.    PMH: HTN, HLD, GERD, angina, Prostate cancer  PSH:   appendectomy, prostate surgery  Social History:  Denies EtOH, tobacco, and illicit drug use  Family History:  Father-CAD, MI, HTN     Previous Cardiac Diagnostics:   Echo 12.3.22  Technically difficult study with limited visualization.  Definity used.    The estimated ejection fraction is 50%.    Left ventricle is normal in size with low-normal systolic function.    Grade 1 left ventricular diastolic dysfunction.    Carotid US 12.3.22  The right internal carotid artery demonstrated no hemodynamically significant stenosis   Left internal carotid artery demonstrated less than 50% stenosis.    Bilateral vertebral arteries were patent with antegrade flow    [facility] 12.2.22  1. LEFT MAIN:  MILD CALCIFICATION BUT NO OBSTRUCTIVE DISEASE.  2. LAD:  THE LAD SHOWS HEAVY CALCIFICATION PROXIMALLY.  THERE IS EVIDENCE OF A 95% STENOSIS PROXIMALLY INVOLVING THE 1ST DIAGONAL.  THE 1ST DIAGONAL SHOWS 95% STENOSIS PROXIMALLY.  THE MID LAD SHOWS 90% STENOSIS.  3. LEFT CIRCUMFLEX:  90% PROXIMAL STENOSIS IS NOTED  4. RIGHT CORONARY ARTERY:  THE RIGHT CORONARY ARTERY SHOWS A LONG, VERY DISTAL 90% LESION WHICH COMPROMISES THE FLOW INTO THE PDA     GRAFT ANGIOGRAPHY:  1. LIMA TO THE LEFT SIDE OF THE CHEST WALL: WIDELY PATENT WITH NO DISEASE  2.  MECHE TO THE RIGHT SIDE OF THE CHEST WALL: WIDELY PATENT WITH NO DISEASE          Review of Systems   Constitutional: Negative for chills and fever.   Cardiovascular:  Negative for chest pain and palpitations.   Respiratory:  Negative for shortness of breath.    Psychiatric/Behavioral:  Negative for altered mental status.          Objective:     Vital Signs (Most Recent):  Temp: 97.7 °F (36.5 °C) (12/06/22 0000)  Pulse: 97 (12/06/22 0630)  Resp: (!) 24 (12/06/22 0630)  BP: (!) 97/57 (12/06/22 0615)  SpO2: 96 % (12/06/22 0630) Vital Signs (24h Range):  Temp:   [97.7 °F (36.5 °C)] 97.7 °F (36.5 °C)  Pulse:  [] 97  Resp:  [9-32] 24  SpO2:  [96 %-100 %] 96 %  BP: ()/(54-82) 97/57     Weight: 79.7 kg (175 lb 11.3 oz)  Body mass index is 25.21 kg/m².    SpO2: 96 %  O2 Device (Oxygen Therapy): nasal cannula      Intake/Output Summary (Last 24 hours) at 12/6/2022 1132  Last data filed at 12/6/2022 1030  Gross per 24 hour   Intake 7488 ml   Output 3365 ml   Net 4123 ml         Lines/Drains/Airways       Central Venous Catheter Line  Duration             Percutaneous Central Line Insertion/Assessment - Double Lumen  12/05/22 1246 right internal jugular <1 day              Drain  Duration                  Chest Tube 12/05/22 1441 Tube - 1 Right Mediastinal 28 Fr. <1 day         Chest Tube 12/05/22 1456 Tube - 2 Left Mediastinal 28 Fr. <1 day         Chest Tube 12/05/22 1458 Tube - 3 Left Midaxillary 28 Fr. <1 day         Urethral Catheter 12/05/22 1235 Non-latex 14 Fr. <1 day              Airway  Duration                  Airway - Non-Surgical 12/05/22 1235 <1 day              Arterial Line  Duration             Arterial Line 12/05/22 1150 Left Radial <1 day              Peripheral Intravenous Line  Duration                  Peripheral IV - Single Lumen 12/01/22 2215 20 G Left Antecubital 4 days                    Significant Labs: CMP   Recent Labs   Lab 12/05/22  1324 12/05/22  1723 12/06/22  0203    143 139   K 3.8 3.3* 4.0   CO2 21* 18* 21*   BUN 14.3 11.2 11.4   CREATININE 0.87 0.87 0.84   CALCIUM 9.0 7.1* 7.0*   ALBUMIN  --  3.2*  --    BILITOT  --  1.4  --    ALKPHOS  --  33*  --    AST  --  39*  --    ALT  --  16  --      , CBC   Recent Labs   Lab 12/05/22  1324 12/05/22  1358 12/05/22  1723 12/06/22  0203   WBC 18.2*  --  13.3* 9.8   HGB 10.4*  --  8.5* 10.2*   HCT 30.4*   < > 24.8* 29.9*   *  --  82* 83*    < > = values in this interval not displayed.     , and Troponin   Recent Labs   Lab 12/06/22  0954   TROPONINI 3.997*         Telemetry:   Paced    Physical Exam:  Physical Exam  Constitutional:       Appearance: Normal appearance.   HENT:      Head: Normocephalic.   Eyes:      Extraocular Movements: Extraocular movements intact.      Conjunctiva/sclera: Conjunctivae normal.   Cardiovascular:      Rate and Rhythm: Normal rate and regular rhythm.      Pulses: Normal pulses.      Heart sounds: Normal heart sounds.      Comments: R Groin Soft/Flat, Non-Tender, No Sign of Bleed/Infection. +2 BLE Palpable Pedal Pulses  Paced rhythm    Pulmonary:      Effort: Pulmonary effort is normal.      Breath sounds: Normal breath sounds.   Abdominal:      Palpations: Abdomen is soft.   Musculoskeletal:         General: Normal range of motion.      Cervical back: Neck supple.   Skin:     General: Skin is warm and dry.      Comments: Midline sternotomy dressing CDI.+CT in place.  Pacer wires in place   Neurological:      Mental Status: He is alert and oriented to person, place, and time. Mental status is at baseline.   Psychiatric:         Mood and Affect: Mood normal.         Behavior: Behavior normal.       Current Inpatient Medications:    Current Facility-Administered Medications:     0.9%  NaCl infusion (for blood administration), , Intravenous, Q24H PRN, Vini Ricks MD    acetaminophen oral solution 650 mg, 650 mg, Per OG tube, Q6H PRN, MARISOL Roca    albumin human 5% bottle 12.5 g, 12.5 g, Intravenous, PRN, MARISOL Roca, Stopped at 12/06/22 0820    amLODIPine tablet 5 mg, 5 mg, Oral, Daily, Candido Martinez MD    aspirin EC tablet 81 mg, 81 mg, Oral, Daily, MARISOL Roca    atorvastatin tablet 80 mg, 80 mg, Oral, QHS, Candido Martinez MD, 80 mg at 12/04/22 2131    calcium gluconate 1 g in NS IVPB (premixed), 1 g, Intravenous, PRN, MARISOL Roca    calcium gluconate 1 g in NS IVPB (premixed), 2 g, Intravenous, PRN, MARISOL Roca    calcium gluconate 1 g in NS IVPB (premixed), 3 g, Intravenous, PRN, MARISOL Roca    cefazolin (ANCEF) 2 gram in  dextrose 5% 50 mL IVPB (premix), 2 g, Intravenous, On Call Procedure, MARISOL Brown    cefazolin (ANCEF) 2 gram in dextrose 5% 50 mL IVPB (premix), 2 g, Intravenous, Q12H, MARISOL Roca, Last Rate: 100 mL/hr at 12/06/22 0611, 2 g at 12/06/22 0611    cefUROXime sodium 1.5 g in dextrose 5 % in water (D5W) 5 % 100 mL IVPB (MB+), 1.5 g, Intravenous, On Call Procedure, Vini Ricks MD    clevidipine (CLEVIPREX) 25 mg/50 mL infusion, 0-21 mg/hr, Intravenous, Continuous, MARISOL Roca, Stopped at 12/05/22 1700    dexmedetomidine (PRECEDEX) 400mcg/100mL 0.9% NaCL infusion, 0-1.4 mcg/kg/hr, Intravenous, Continuous, MARISOL Roca, Stopped at 12/05/22 2200    dextrose 10 % infusion, 12.5 g, Intravenous, PRN, MARISOL Roca    dextrose 10 % infusion, 25 g, Intravenous, PRN, MARISOL Roca    dextrose 5 % and 0.45 % NaCl infusion, , Intravenous, Continuous, MARISOL Roca, Last Rate: 100 mL/hr at 12/06/22 0612, New Bag at 12/06/22 0612    docusate sodium capsule 100 mg, 100 mg, Oral, BID, MARISOL Roca, 100 mg at 12/06/22 0818    electrolyte-A infusion, , Intravenous, Continuous, MARISOL Roca    enoxaparin injection 40 mg, 40 mg, Subcutaneous, Daily, MARISOL Roca    EPINEPHrine (ADRENALIN) 5 mg in dextrose 5 % 250 mL infusion, 0-2 mcg/kg/min (Dosing Weight), Intravenous, Continuous, MARISOL Roca, Last Rate: 2.3 mL/hr at 12/05/22 2300, 0.01 mcg/kg/min at 12/05/22 2300    famotidine (PF) injection 20 mg, 20 mg, Intravenous, Daily, MARISOL Roca, 20 mg at 12/06/22 0817    folic acid tablet 1 mg, 1 mg, Oral, Daily, MARISOL Roca, 1 mg at 12/06/22 0818    HYDROcodone-acetaminophen 5-325 mg per tablet 1 tablet, 1 tablet, Oral, Q4H PRN, MARISOL Roca    insulin regular in 0.9 % NaCl 100 unit/100 mL (1 unit/mL) infusion, 0-52 Units/hr, Intravenous, Continuous, MARISOL Roca, Last Rate: 0 mL/hr at 12/06/22 0630, 0 Units/hr at 12/06/22 0630    ketorolac injection 15 mg, 15 mg, Intravenous, Q6H, MARISOL Roca, 15  mg at 12/06/22 0611    lactulose 10 gram/15 ml solution 20 g, 20 g, Oral, Q6H PRN, MARISOL Roca    loperamide capsule 2 mg, 2 mg, Oral, Continuous PRN, MARISOL Roca    losartan tablet 25 mg, 25 mg, Oral, Daily, Candido Martinez MD, 25 mg at 12/04/22 0903    magnesium sulfate 2g in water 50mL IVPB (premix), 2 g, Intravenous, PRN, MARISOL Roca    magnesium sulfate 2g in water 50mL IVPB (premix), 4 g, Intravenous, PRN, MARISOL Roca    melatonin tablet 6 mg, 6 mg, Oral, Nightly PRN, Candido Martinez MD, 6 mg at 12/04/22 2359    metoclopramide HCl injection 5 mg, 5 mg, Intravenous, Q6H PRN, MARISOL Roca    metoprolol tartrate (LOPRESSOR) split tablet 12.5 mg, 12.5 mg, Oral, BID, MARISOL Roca    morphine injection 4 mg, 4 mg, Intravenous, Q4H PRN, MARISOL Roca, 4 mg at 12/05/22 2221    mupirocin 2 % ointment, , Nasal, On Call Procedure, MARISOL Brown    mupirocin 2 % ointment, , Topical (Top), Daily, Yair Benson MD, Given at 12/06/22 0900    mupirocin 2 % ointment, , Nasal, BID, MARISOL Roca, Given at 12/06/22 0900    nitroGLYCERIN SL tablet, , , PRN, Candido Martinez MD, 0.1 mg at 12/02/22 1754    ondansetron 4 mg/2 mL injection, , , ,     ondansetron injection 4 mg, 4 mg, Intravenous, Q4H PRN, MARISOL Roca, 4 mg at 12/06/22 0945    oxyCODONE immediate release tablet 10 mg, 10 mg, Oral, Q4H PRN, MARISOL Roca, 10 mg at 12/06/22 0208    perflutren lipid microspheres injection 1.3 mL, 1.3 mL, Intravenous, Once, Rhys Samaniego MD    potassium chloride 20 mEq in 100 mL IVPB (FOR CENTRAL LINE ADMINISTRATION ONLY), 20 mEq, Intravenous, PRN, MARISOL Roca, Last Rate: 50 mL/hr at 12/05/22 1847, 20 mEq at 12/05/22 1847    potassium chloride 20 mEq in 100 mL IVPB (FOR CENTRAL LINE ADMINISTRATION ONLY), 60 mEq, Intravenous, PRN, MARISOL Roca    potassium chloride 40 mEq in 100 mL IVPB (FOR CENTRAL LINE ADMINISTRATION ONLY), 40 mEq, Intravenous, PRN, MARISOL Roca    promethazine injection 25 mg, 25  mg, Intramuscular, Q4H PRN, Yair Benson MD, 25 mg at 12/06/22 1030    sodium chloride 0.9% flush 10 mL, 10 mL, Intravenous, PRN, Candido Martinez MD    sodium phosphate 15 mmol in dextrose 5 % 250 mL IVPB, 15 mmol, Intravenous, PRN, MARISOL Roca    sodium phosphate 20.01 mmol in dextrose 5 % 250 mL IVPB, 20.01 mmol, Intravenous, PRN, MARISOL Roca    sodium phosphate 30 mmol in dextrose 5 % 250 mL IVPB, 30 mmol, Intravenous, PRN, MARISOL Roca    sucralfate tablet 1 g, 1 g, Oral, Q6H, Candido Martinez MD, 1 g at 12/06/22 0611        Assessment:     IMPRESSION:  NSTEMI type I  MV CAD/CABG x 3 (LIMA To LAD, SVG to Diag, SVG to RPDA)  Sinus Arrest leading to cardiac arrest    -patient was junctional,then has sinus arresst which lead to asystole  SSS  ICMO    -Repeat echo after arrest revealed decreased EF, final report pending, reviewed at bedside by Dr. Samaniego  Angina  HTN  HLD  GERD  No Hx of GI Bleed      Plan:     PLAN:  Continue ASA, high intensity statin,   Right groin precautions/activity restrictions  Hold BB and ARB for now  Will need PPM vs ICD, case discussed with Dr. Ricks by Dr. Samaniego  Continue temp pacing      Nehemias Santos [facility]  Cardiology  [facility] - Observation Unit  12/06/2022

## 2022-12-06 NOTE — ANESTHESIA POSTPROCEDURE EVALUATION
Anesthesia Post Evaluation    Patient: Suresh Aragon    Procedure(s) Performed: Procedure(s) (LRB):  CORONARY ARTERY BYPASS GRAFT (CABG) (N/A)    Final Anesthesia Type: general      Patient location during evaluation: ICU  Patient participation: Yes- Able to Participate  Level of consciousness: awake and alert  Post-procedure vital signs: reviewed and stable  Pain management: adequate  Airway patency: patent    PONV status at discharge: No PONV  Anesthetic complications: no    Mell-operative Events Comments: Patient extubated and on minimal epinephrine support  Cardiovascular status: hemodynamically stable  Respiratory status: spontaneous ventilation and nasal cannula  Follow-up not needed.          Vitals Value Taken Time   BP 96/56 12/06/22 0746   Temp 36.5 °C (97.7 °F) 12/06/22 0000   Pulse 88 12/06/22 0755   Resp 16 12/06/22 0755   SpO2 97 % 12/06/22 0755   Vitals shown include unvalidated device data.      No case tracking events are documented in the log.      Pain/Marilee Score: Pain Rating Prior to Med Admin: 6 (12/6/2022  6:11 AM)         EOAE (evoked otoacoustic emission)

## 2022-12-06 NOTE — NURSING
Nurses Note -- 4 Eyes      12/6/2022   6:55 AM      Skin assessed during: Daily Assessment      [x] No Pressure Injuries Present    []Prevention Measures Documented      [] Yes- Altered Skin Integrity Present or Discovered   [] LDA Added if Not in Epic (Describe Wound)   [] New Altered Skin Integrity was Present on Admit and Documented in LDA   [] Wound Image Taken    Wound Care Consulted? No    Attending Nurse:  Marline Becerril RN     Second RN/Staff Member:  Donald Knox RN

## 2022-12-06 NOTE — PROGRESS NOTES
"Pt reported, "feeling anxious", asystole event at [id number]. Dr. TRUDY Samaniego at bedside. Pt achieved ROSC with junctional rhythm, started externally pacing patient. Returned patient to bed, notified Dr. SARA Pacheco of cardiac arrest event. Pt repositioned in bed and reports feeling "like himself" again.   "

## 2022-12-06 NOTE — PROGRESS NOTES
CT Surgery  Progress Note    Subjective:  Extubated around 9pm  Off epi since early this morning  Good UOP overnight > 30cc/hr  Tolerated small sips of water and ice chips, no nausea or vomiting  Sitting up in chair this morning    Got 2u pRBC, albumin, and 2L crystalloid since arriving to unit.    Objective:  Temp:  [97.7 °F (36.5 °C)-98.2 °F (36.8 °C)] 97.7 °F (36.5 °C)  Pulse:  [60-98] 98  Resp:  [9-24] 17  SpO2:  [95 %-100 %] 98 %  BP: ()/() 118/64    Physical Exam:  Gen: no acute distress. Alert and oriented x3.  HEENT: normocephalic and atraumatic. EOMI. R IJ CVL  Resp: unlabored respirations  CV: regular rate, mediastinal tubes and CT in place with serosang output, Incision with dressings C/D/I  Abd: soft, NT, ND  Ext: warm and well perfused  MSK: normal range of motion, normal strength  Neuro: no focal deficits, normal sensation    I/O:  UOP 2.6L  Mediastinal tubes 110cc  L CT 340cc    Labs:  H/H 10/29  WBC 9  PLT 83    BUN/Cr 11/0.84  K 4    Imaging:  CXR pending    Assessment & Plan:  77 y.o. male with a hx of HTN, HLD, CAD s/p CABG x3 LIMA-LAD, SVG-D1 & PDA, and GERD.    Neuro: Alert and oriented. Continue pain control with PO narcotics with IV breakthru.  Respiratory: Extubated yesterday evening, doing well. Continue IS  CV: Pacing off, HDS off of epi with MAPs 66-80s  -Amlodipine, ASA, metoprolol, statin, losartin  Renal: Good UOP 2.6L with BUN/Cr 11/0.84  FEN/GI: Sips and chips, replete electrolytes PRN. Will discuss advancing diet   Heme: H/H 10/29, plt 83. Continue to monitor. Transfuse for Hg < 7.0 or for symptomatic.  ID: WBC 9. Afebrile. No current signs of infection.  Endo: On insulin gtt at 3, weaning off as tolerated. Accucheks. Sliding scale insulin as needed.  Wounds/MSK: Turn pt q2 hours. OOB to chiar    Ppx: Lovenox for DVT ppx, pepcid for GI ppx.    Courtney Benitez MD  [employer] General Surgery - PGY4

## 2022-12-07 LAB
ANION GAP SERPL CALC-SCNC: 5 MEQ/L
BASOPHILS # BLD AUTO: 0.03 X10(3)/MCL (ref 0–0.2)
BASOPHILS NFR BLD AUTO: 0.3 %
BUN SERPL-MCNC: 14.5 MG/DL (ref 8.4–25.7)
CALCIUM SERPL-MCNC: 7.2 MG/DL (ref 8.8–10)
CHLORIDE SERPL-SCNC: 107 MMOL/L (ref 98–107)
CO2 SERPL-SCNC: 19 MMOL/L (ref 23–31)
CREAT SERPL-MCNC: 1.14 MG/DL (ref 0.73–1.18)
CREAT/UREA NIT SERPL: 13
EOSINOPHIL # BLD AUTO: 0 X10(3)/MCL (ref 0–0.9)
EOSINOPHIL NFR BLD AUTO: 0 %
ERYTHROCYTE [DISTWIDTH] IN BLOOD BY AUTOMATED COUNT: 13.8 % (ref 11.5–17)
GFR SERPLBLD CREATININE-BSD FMLA CKD-EPI: >60 MLS/MIN/1.73/M2
GLUCOSE SERPL-MCNC: 163 MG/DL (ref 82–115)
HCT VFR BLD AUTO: 27.2 % (ref 42–52)
HGB BLD-MCNC: 9.3 GM/DL (ref 14–18)
IMM GRANULOCYTES # BLD AUTO: 0.04 X10(3)/MCL (ref 0–0.04)
IMM GRANULOCYTES NFR BLD AUTO: 0.4 %
LYMPHOCYTES # BLD AUTO: 2.68 X10(3)/MCL (ref 0.6–4.6)
LYMPHOCYTES NFR BLD AUTO: 26.4 %
MCH RBC QN AUTO: 32.1 PG (ref 27–31)
MCHC RBC AUTO-ENTMCNC: 34.2 MG/DL (ref 33–36)
MCV RBC AUTO: 93.8 FL (ref 80–94)
MONOCYTES # BLD AUTO: 1.44 X10(3)/MCL (ref 0.1–1.3)
MONOCYTES NFR BLD AUTO: 14.2 %
NEUTROPHILS # BLD AUTO: 6 X10(3)/MCL (ref 2.1–9.2)
NEUTROPHILS NFR BLD AUTO: 58.7 %
NRBC BLD AUTO-RTO: 0 %
PLATELET # BLD AUTO: 82 X10(3)/MCL (ref 130–400)
PMV BLD AUTO: 10.5 FL (ref 7.4–10.4)
POCT GLUCOSE: 158 MG/DL (ref 70–110)
POTASSIUM SERPL-SCNC: 3.7 MMOL/L (ref 3.5–5.1)
RBC # BLD AUTO: 2.9 X10(6)/MCL (ref 4.7–6.1)
SODIUM SERPL-SCNC: 131 MMOL/L (ref 136–145)
WBC # SPEC AUTO: 10.2 X10(3)/MCL (ref 4.5–11.5)

## 2022-12-07 PROCEDURE — 85025 COMPLETE CBC W/AUTO DIFF WBC: CPT

## 2022-12-07 PROCEDURE — 25000003 PHARM REV CODE 250

## 2022-12-07 PROCEDURE — 25000003 PHARM REV CODE 250: Performed by: INTERNAL MEDICINE

## 2022-12-07 PROCEDURE — S5010 5% DEXTROSE AND 0.45% SALINE: HCPCS

## 2022-12-07 PROCEDURE — 63600175 PHARM REV CODE 636 W HCPCS

## 2022-12-07 PROCEDURE — 80048 BASIC METABOLIC PNL TOTAL CA: CPT

## 2022-12-07 PROCEDURE — 20000000 HC ICU ROOM

## 2022-12-07 PROCEDURE — 36415 COLL VENOUS BLD VENIPUNCTURE: CPT

## 2022-12-07 RX ORDER — SIMETHICONE 80 MG
1 TABLET,CHEWABLE ORAL 3 TIMES DAILY PRN
Status: DISCONTINUED | OUTPATIENT
Start: 2022-12-08 | End: 2022-12-15 | Stop reason: HOSPADM

## 2022-12-07 RX ADMIN — MELATONIN TAB 3 MG 6 MG: 3 TAB at 09:12

## 2022-12-07 RX ADMIN — KETOROLAC TROMETHAMINE 15 MG: 30 INJECTION, SOLUTION INTRAMUSCULAR at 05:12

## 2022-12-07 RX ADMIN — FOLIC ACID 1 MG: 1 TABLET ORAL at 08:12

## 2022-12-07 RX ADMIN — ASPIRIN 81 MG: 81 TABLET, COATED ORAL at 08:12

## 2022-12-07 RX ADMIN — SUCRALFATE 1 G: 1 TABLET ORAL at 05:12

## 2022-12-07 RX ADMIN — SIMETHICONE 80 MG: 80 TABLET, CHEWABLE ORAL at 11:12

## 2022-12-07 RX ADMIN — ENOXAPARIN SODIUM 40 MG: 40 INJECTION SUBCUTANEOUS at 05:12

## 2022-12-07 RX ADMIN — KETOROLAC TROMETHAMINE 15 MG: 30 INJECTION, SOLUTION INTRAMUSCULAR at 11:12

## 2022-12-07 RX ADMIN — SUCRALFATE 1 G: 1 TABLET ORAL at 11:12

## 2022-12-07 RX ADMIN — MUPIROCIN: 20 OINTMENT TOPICAL at 09:12

## 2022-12-07 RX ADMIN — DOCUSATE SODIUM 100 MG: 100 CAPSULE, LIQUID FILLED ORAL at 08:12

## 2022-12-07 RX ADMIN — MUPIROCIN: 20 OINTMENT TOPICAL at 08:12

## 2022-12-07 RX ADMIN — DEXTROSE AND SODIUM CHLORIDE: 5; 450 INJECTION, SOLUTION INTRAVENOUS at 02:12

## 2022-12-07 RX ADMIN — SUCRALFATE 1 G: 1 TABLET ORAL at 06:12

## 2022-12-07 RX ADMIN — ATORVASTATIN CALCIUM 80 MG: 40 TABLET, FILM COATED ORAL at 08:12

## 2022-12-07 RX ADMIN — FAMOTIDINE 20 MG: 10 INJECTION, SOLUTION INTRAVENOUS at 08:12

## 2022-12-07 RX ADMIN — KETOROLAC TROMETHAMINE 15 MG: 30 INJECTION, SOLUTION INTRAMUSCULAR at 06:12

## 2022-12-07 NOTE — PROGRESS NOTES
[facility] - Observation Unit  Cardiology  Progress Note    Patient Name: Suresh Aragon  MRN: [id number]  Admission Date: 12/1/2022  Hospital Length of Stay: 2 days  Code Status: Full Code   Attending Physician: Yair Benson MD   Primary Care Physician: No primary care provider on file.  Expected Discharge Date:   Principal Problem:Unstable angina    Subjective:     Brief HPI:   This is a 77-year-old male, who is known to Dr. Martinez, with a history of HTN, HLD, GERD, angina.  Patient presents to the ER on 12/01/2022 with complaints of chest pain.  Of note patient was just seen in clinic by Dr. Martinez where he was advised to present to ER if had chest pain secondary to unstable angina prior to his scheduled outpatient LHC.  Patient stated the pain started approximately 7 days prior.  He stated the pain feels like a pressure and radiates down to his left arm.  He said the pain is worse with exertion and improves with rest.  He also had associated shortness of breath with exertion.  Initial troponin was negative however repeat troponins are now mildly elevated.  Initial EKG revealed normal sinus rhythm with a left axis deviation and LVH.  [facility] has admitted the patient for unstable angina.     Hospital Course:   12.3.22: NAD noted. VSS. SR on tele. Denies CP/SOB/Palps.  Right groin benign.  12.4.22: NAD noted. VSS. SR on tele. Denies CP/SOB/Palps. Awaiting CABG.  12.5.22: NAD noted. VSS. SR on tele. NPO for CABG today.  12.6.22:  Upon rounds this a.m. patient was in junctional rhythm with heart rate in the 90s.  Patient then stated he felt anxious and had sinus arrest with asystole.  Code was activated.  Patient was placed back in bed once patient in bed he recovered spontaneously without CPR or epinephrine.  Patient was placed back on external pacing.  Rate was attempted to be turned down to see underlying rhythm however patient continued to require pacing even pacer is lowest rate.  Upon recovery patient began  vomiting and complained of feeling flushed.  Vital signs stable, no vasopressors required.  12.7.22: NAD noted. VSS. Paced on tele. VSS. Denies SOB/Palps, +incisional CP.    PMH: HTN, HLD, GERD, angina, Prostate cancer  PSH:   appendectomy, prostate surgery  Social History:  Denies EtOH, tobacco, and illicit drug use  Family History:  Father-CAD, MI, HTN     Previous Cardiac Diagnostics:   Echo limited 12.6.22  Limited echo done by the bedside with physician post cardiac arrest; patient had open heart surgery 12/05/2022; definity used to evaluate LV ejection fraction for pacemaker evaluation.    The visually estimated ejection fraction is 35-40%.    Moderately decreased systolic function    Echo 12.3.22  Technically difficult study with limited visualization.  Definity used.    The estimated ejection fraction is 50%.    Left ventricle is normal in size with low-normal systolic function.    Grade 1 left ventricular diastolic dysfunction.    Carotid US 12.3.22  The right internal carotid artery demonstrated no hemodynamically significant stenosis   Left internal carotid artery demonstrated less than 50% stenosis.    Bilateral vertebral arteries were patent with antegrade flow    [facility] 12.2.22  1. LEFT MAIN:  MILD CALCIFICATION BUT NO OBSTRUCTIVE DISEASE.  2. LAD:  THE LAD SHOWS HEAVY CALCIFICATION PROXIMALLY.  THERE IS EVIDENCE OF A 95% STENOSIS PROXIMALLY INVOLVING THE 1ST DIAGONAL.  THE 1ST DIAGONAL SHOWS 95% STENOSIS PROXIMALLY.  THE MID LAD SHOWS 90% STENOSIS.  3. LEFT CIRCUMFLEX:  90% PROXIMAL STENOSIS IS NOTED  4. RIGHT CORONARY ARTERY:  THE RIGHT CORONARY ARTERY SHOWS A LONG, VERY DISTAL 90% LESION WHICH COMPROMISES THE FLOW INTO THE PDA     GRAFT ANGIOGRAPHY:  1. LIMA TO THE LEFT SIDE OF THE CHEST WALL: WIDELY PATENT WITH NO DISEASE  2.  MECHE TO THE RIGHT SIDE OF THE CHEST WALL: WIDELY PATENT WITH NO DISEASE          Review of Systems   Constitutional: Negative for chills and fever.   Cardiovascular:  Positive  for chest pain. Negative for palpitations.        Incisional   Respiratory:  Negative for shortness of breath.    Psychiatric/Behavioral:  Negative for altered mental status.          Objective:     Vital Signs (Most Recent):  Temp: 98.4 °F (36.9 °C) (12/07/22 0800)  Pulse: 79 (12/07/22 0900)  Resp: 19 (12/07/22 0900)  BP: 94/62 (Checked manual BP: 104/60. Cuff pressure more accurate than ART line) (12/07/22 0900)  SpO2: 97 % (12/07/22 0900) Vital Signs (24h Range):  Temp:  [97.8 °F (36.6 °C)-99.1 °F (37.3 °C)] 98.4 °F (36.9 °C)  Pulse:  [79-80] 79  Resp:  [14-38] 19  SpO2:  [91 %-97 %] 97 %  BP: ()/(57-74) 94/62     Weight: 80.1 kg (176 lb 9.4 oz)  Body mass index is 25.34 kg/m².    SpO2: 97 %  O2 Device (Oxygen Therapy): room air      Intake/Output Summary (Last 24 hours) at 12/7/2022 0949  Last data filed at 12/7/2022 0639  Gross per 24 hour   Intake 1 ml   Output 1540 ml   Net -1539 ml         Lines/Drains/Airways       Central Venous Catheter Line  Duration             Percutaneous Central Line Insertion/Assessment - Double Lumen  12/05/22 1246 right internal jugular 1 day              Drain  Duration                  Chest Tube 12/05/22 1441 Tube - 1 Right Mediastinal 28 Fr. 1 day         Chest Tube 12/05/22 1456 Tube - 2 Left Mediastinal 28 Fr. 1 day         Chest Tube 12/05/22 1458 Tube - 3 Left Midaxillary 28 Fr. 1 day              Arterial Line  Duration             Arterial Line 12/05/22 1150 Left Radial 1 day                    Significant Labs: CMP   Recent Labs   Lab 12/05/22  1723 12/06/22  0203 12/07/22  0152    139 131*   K 3.3* 4.0 3.7   CO2 18* 21* 19*   BUN 11.2 11.4 14.5   CREATININE 0.87 0.84 1.14   CALCIUM 7.1* 7.0* 7.2*   ALBUMIN 3.2*  --   --    BILITOT 1.4  --   --    ALKPHOS 33*  --   --    AST 39*  --   --    ALT 16  --   --      , CBC   Recent Labs   Lab 12/05/22  1723 12/06/22  0203 12/07/22  0152   WBC 13.3* 9.8 10.2   HGB 8.5* 10.2* 9.3*   HCT 24.8* 29.9* 27.2*   PLT 82*  83* 82*     , and Troponin   Recent Labs   Lab 12/06/22  0954 12/06/22  1559 12/06/22  2153   TROPONINI 3.997* 2.586* 2.104*         Telemetry:  Paced    Physical Exam:  Physical Exam  Constitutional:       Appearance: Normal appearance.   HENT:      Head: Normocephalic.   Eyes:      Extraocular Movements: Extraocular movements intact.      Conjunctiva/sclera: Conjunctivae normal.   Cardiovascular:      Rate and Rhythm: Normal rate and regular rhythm.      Pulses: Normal pulses.      Heart sounds: Normal heart sounds.      Comments: R Groin Soft/Flat, Non-Tender, No Sign of Bleed/Infection. +2 BLE Palpable Pedal Pulses  Paced rhythm    Pulmonary:      Effort: Pulmonary effort is normal.      Breath sounds: Normal breath sounds.   Abdominal:      Palpations: Abdomen is soft.   Musculoskeletal:         General: Normal range of motion.      Cervical back: Neck supple.   Skin:     General: Skin is warm and dry.      Comments: Midline sternotomy dressing CDI.+CT in place.  Pacer wires in place   Neurological:      Mental Status: He is alert and oriented to person, place, and time. Mental status is at baseline.   Psychiatric:         Mood and Affect: Mood normal.         Behavior: Behavior normal.       Current Inpatient Medications:    Current Facility-Administered Medications:     0.9%  NaCl infusion (for blood administration), , Intravenous, Q24H PRN, Vini Ricks MD    acetaminophen oral solution 650 mg, 650 mg, Per OG tube, Q6H PRN, MARISOL Roca    albumin human 5% bottle 12.5 g, 12.5 g, Intravenous, PRN, MARISOL Roca, Stopped at 12/06/22 0820    amLODIPine tablet 5 mg, 5 mg, Oral, Daily, Candido Martinez MD    aspirin EC tablet 81 mg, 81 mg, Oral, Daily, MARISOL Roca, 81 mg at 12/07/22 0840    atorvastatin tablet 80 mg, 80 mg, Oral, QHS, Candido Martinez MD, 80 mg at 12/06/22 2104    calcium gluconate 1 g in NS IVPB (premixed), 1 g, Intravenous, PRN, MARISOL Roca    calcium gluconate 1 g in NS IVPB  (premixed), 2 g, Intravenous, PRN, MARISOL Roca    calcium gluconate 1 g in NS IVPB (premixed), 3 g, Intravenous, PRN, MARISOL Roca    cefazolin (ANCEF) 2 gram in dextrose 5% 50 mL IVPB (premix), 2 g, Intravenous, On Call Procedure, MARISOL Brown    cefUROXime sodium 1.5 g in dextrose 5 % in water (D5W) 5 % 100 mL IVPB (MB+), 1.5 g, Intravenous, On Call Procedure, Vini Ricks MD    clevidipine (CLEVIPREX) 25 mg/50 mL infusion, 0-21 mg/hr, Intravenous, Continuous, MARISOL Roca, Stopped at 12/05/22 1700    dexmedetomidine (PRECEDEX) 400mcg/100mL 0.9% NaCL infusion, 0-1.4 mcg/kg/hr, Intravenous, Continuous, MARISOL Roca, Stopped at 12/05/22 2200    dextrose 10 % infusion, 12.5 g, Intravenous, PRN, MARISOL Roca    dextrose 10 % infusion, 25 g, Intravenous, PRN, MARISOL Roca    dextrose 5 % and 0.45 % NaCl infusion, , Intravenous, Continuous, MARISOL Roca, Last Rate: 100 mL/hr at 12/07/22 0200, New Bag at 12/07/22 0200    docusate sodium capsule 100 mg, 100 mg, Oral, BID, MARISOL Roca, 100 mg at 12/07/22 0840    enoxaparin injection 40 mg, 40 mg, Subcutaneous, Daily, MARISOL Roca, 40 mg at 12/06/22 1725    EPINEPHrine (ADRENALIN) 5 mg in dextrose 5 % 250 mL infusion, 0-2 mcg/kg/min (Dosing Weight), Intravenous, Continuous, MARISOL Roca, Last Rate: 2.3 mL/hr at 12/05/22 2300, 0.01 mcg/kg/min at 12/05/22 2300    famotidine (PF) injection 20 mg, 20 mg, Intravenous, Daily, MARISOL Roca, 20 mg at 12/07/22 0840    folic acid tablet 1 mg, 1 mg, Oral, Daily, MARIOSL Roca, 1 mg at 12/07/22 0840    HYDROcodone-acetaminophen 5-325 mg per tablet 1 tablet, 1 tablet, Oral, Q4H PRN, MARISOL Roca    insulin regular in 0.9 % NaCl 100 unit/100 mL (1 unit/mL) infusion, 0-52 Units/hr, Intravenous, Continuous, MARISOL Roca, Last Rate: 0 mL/hr at 12/06/22 0630, 0 Units/hr at 12/06/22 0630    ketorolac injection 15 mg, 15 mg, Intravenous, Q6H, MARISOL Roca, 15 mg at 12/07/22 0600    lactulose 10  gram/15 ml solution 20 g, 20 g, Oral, Q6H PRN, MARISOL Roca    loperamide capsule 2 mg, 2 mg, Oral, Continuous PRN, MARISOL Roca    losartan tablet 25 mg, 25 mg, Oral, Daily, Candido Martinez MD, 25 mg at 12/04/22 0903    magnesium sulfate 2g in water 50mL IVPB (premix), 2 g, Intravenous, PRN, MARISOL Roca    magnesium sulfate 2g in water 50mL IVPB (premix), 4 g, Intravenous, PRN, MARISOL Roca    melatonin tablet 6 mg, 6 mg, Oral, Nightly PRN, Candido Martinez MD, 6 mg at 12/04/22 2359    metoclopramide HCl injection 5 mg, 5 mg, Intravenous, Q6H PRN, MARISOL Roca    metoprolol tartrate (LOPRESSOR) split tablet 12.5 mg, 12.5 mg, Oral, BID, MARISOL Roca    morphine injection 4 mg, 4 mg, Intravenous, Q4H PRN, MARISOL Roca, 4 mg at 12/06/22 1226    mupirocin 2 % ointment, , Nasal, On Call Procedure, MARISOL Brown    mupirocin 2 % ointment, , Topical (Top), Daily, Yair Benson MD, Given at 12/06/22 0900    mupirocin 2 % ointment, , Nasal, BID, MARISOL Roca, Given at 12/06/22 2105    nitroGLYCERIN SL tablet, , , PRN, Candido Martinez MD, 0.1 mg at 12/02/22 1754    ondansetron injection 4 mg, 4 mg, Intravenous, Q4H PRN, MARISOL Roca, 4 mg at 12/06/22 0945    oxyCODONE immediate release tablet 10 mg, 10 mg, Oral, Q4H PRN, MARISOL Roca, 10 mg at 12/06/22 0208    perflutren lipid microspheres injection 1.3 mL, 1.3 mL, Intravenous, Once, Rhys Samaniego MD    potassium chloride 20 mEq in 100 mL IVPB (FOR CENTRAL LINE ADMINISTRATION ONLY), 20 mEq, Intravenous, PRN, MARISOL Roca, Last Rate: 50 mL/hr at 12/05/22 1847, 20 mEq at 12/05/22 1847    potassium chloride 20 mEq in 100 mL IVPB (FOR CENTRAL LINE ADMINISTRATION ONLY), 60 mEq, Intravenous, PRN, MARISOL Roca    potassium chloride 40 mEq in 100 mL IVPB (FOR CENTRAL LINE ADMINISTRATION ONLY), 40 mEq, Intravenous, PRN, MARISOL Roca    promethazine injection 25 mg, 25 mg, Intramuscular, Q4H PRN, Yair Benson MD, 25 mg at 12/06/22 1030    sodium  chloride 0.9% flush 10 mL, 10 mL, Intravenous, PRN, Candido Martinez MD    sodium phosphate 15 mmol in dextrose 5 % 250 mL IVPB, 15 mmol, Intravenous, PRN, MARISOL Roca    sodium phosphate 20.01 mmol in dextrose 5 % 250 mL IVPB, 20.01 mmol, Intravenous, PRN, MARISOL Roca    sodium phosphate 30 mmol in dextrose 5 % 250 mL IVPB, 30 mmol, Intravenous, PRN, MARISOL Roca    sucralfate tablet 1 g, 1 g, Oral, Q6H, Candido Martinez MD, 1 g at 12/07/22 0600        Assessment:     IMPRESSION:  NSTEMI type I  MV CAD/CABG x 3 (LIMA To LAD, SVG to Diag, SVG to RPDA)  Sinus Arrest leading to cardiac arrest    -patient was junctional,then has sinus arresst which lead to asystole  SSS  ICMO    -Repeat echo after arrest revealed decreased EF, final report pending, reviewed at bedside by Dr. Samaniego  Angina  HTN  HLD  GERD  No Hx of GI Bleed      Plan:     PLAN:  Continue ASA, high intensity statin,   Right groin precautions/activity restrictions  Hold BB and ARB for now  Plan for device in Am with Dr. Ricks  Continue temp pacing      Nehemias Santos [facility]  Cardiology  [facility] - Observation Unit  12/07/2022      I have seen the patient, reviewed the Nurse Practitioner's progress note, assessment and plan. I have personally interviewed and examined the patient at bedside and agree with the findings.   Patient c/o left shoulder and abdominal pain.  Left shoulder appears musculoskeletal as it was reproducible by palpation  For PPM in am by Dr. Mingo Ricks

## 2022-12-07 NOTE — PROGRESS NOTES
Pulmonary Critical Care   Progress note    Patient Name: Suresh Aragon  MRN: [id number]  Admission Date: 12/1/2022  Hospital Length of Stay: 2 days  Code Status: Full Code  Attending Provider: Yair Benson MD  Primary Care Provider: No primary care provider on file.     Subjective:     HPI:   Suresh Aragon is a 77 y.o. male with a hx of HTN, HLD, CAD s/p CABG x3 LIMA-LAD, SVG-D1 & PDA, and GERD. Patient presents to the ER on 12/01/2022 with complaints of chest pain.  Of note patient was just seen in clinic by Dr. Martinez where he was advised to present to ER if had chest pain secondary to unstable angina prior to his scheduled outpatient LHC.  Patient stated the pain started approximately 7 days prior.  He stated the pain feels like a pressure and radiates down to his left arm.  He said the pain is worse with exertion and improves with rest.  He also had associated shortness of breath with exertion.  Initial troponin was negative however repeat troponins are now mildly elevated.  Initial EKG revealed normal sinus rhythm with a left axis deviation and LVH.  [facility] has admitted the patient for unstable angina. He had a LHC on 12/2/22 showing severe multivessel CAD and was referred for CABG. Following successful CABG with 56 minutes on bypass with 34 minutes aortic clamping he was easily taken off but pt was found to have minimal electrical activity and was pacer dependent. He remained stable and intubated and transferred to ICU.    Hospital Course/Significant events:  12.3.22: NAD noted. VSS. SR on tele. Denies CP/SOB/Palps.  Right groin benign.  12.4.22: NAD noted. VSS. SR on tele. Denies CP/SOB/Palps. Awaiting CABG.  12/5/22: s/p CABG, ETT, precedex, epinephrine, paced, insulin gtt at 4, bear hugger, fluids  Extubated on 12/5    24 Hour Interval History:  Asystole event yesterday at around 930 yesterday. Pt achieved ROSC with junctional rhythm, started externally pacing. No acute events overnight. Pt satting on room  air.    No past medical history on file.    Past Surgical History:   Procedure Laterality Date    ANGIOGRAM, CORONARY, WITH LEFT HEART CATHETERIZATION N/A 12/2/2022    Procedure: Angiogram, Coronary, with Left Heart Cath;  Surgeon: Candido Martinez MD;  Location: [facility] CATH LAB;  Service: Cardiology;  Laterality: N/A;    CORONARY ARTERY BYPASS GRAFT (CABG) N/A 12/5/2022    Procedure: CORONARY ARTERY BYPASS GRAFT (CABG);  Surgeon: Vini Ricks MD;  Location: [facility] OR;  Service: Cardiothoracic;  Laterality: N/A;  Case #3       Social History     Socioeconomic History    Marital status:      Social Determinants of Health     Financial Resource Strain: Low Risk     Difficulty of Paying Living Expenses: Not hard at all   Food Insecurity: No Food Insecurity    Worried About Running Out of Food in the Last Year: Never true    Ran Out of Food in the Last Year: Never true   Transportation Needs: No Transportation Needs    Lack of Transportation (Medical): No    Lack of Transportation (Non-Medical): No   Social Connections: Unknown    Marital Status:    Housing Stability: Unknown    Unable to Pay for Housing in the Last Year: No           Current Outpatient Medications   Medication Instructions    amLODIPine (NORVASC) 5 mg, Oral, Daily    pantoprazole (PROTONIX) 40 mg, Oral, Daily    tadalafiL (CIALIS) 5 mg, Oral, Daily    traMADoL (ULTRAM) 50 mg, Oral, 2 times daily PRN    traZODone (DESYREL) 100 mg, Oral, Nightly       Current Inpatient Medications   amLODIPine  5 mg Oral Daily    aspirin  81 mg Oral Daily    atorvastatin  80 mg Oral QHS    docusate sodium  100 mg Oral BID    enoxaparin  40 mg Subcutaneous Daily    famotidine (PF)  20 mg Intravenous Daily    folic acid  1 mg Oral Daily    ketorolac  15 mg Intravenous Q6H    losartan  25 mg Oral Daily    metoprolol tartrate  12.5 mg Oral BID    mupirocin   Topical (Top) Daily    mupirocin   Nasal BID    perflutren lipid microspheres  1.3 mL Intravenous Once     sucralfate  1 g Oral Q6H       Current Intravenous Infusions   clevidipine Stopped (12/05/22 1700)    dexmedetomidine (PRECEDEX) infusion Stopped (12/05/22 2200)    dextrose 5 % and 0.45 % NaCl 100 mL/hr at 12/07/22 0200    EPINEPHrine 0.01 mcg/kg/min (12/05/22 2300)    insulin regular 1 units/mL infusion orderable (CTS POST-OP) 0 Units/hr (12/06/22 0630)    loperamide           Objective:       Intake/Output Summary (Last 24 hours) at 12/7/2022 0515  Last data filed at 12/6/2022 2200  Gross per 24 hour   Intake 1155 ml   Output 1055 ml   Net 100 ml           Vital Signs (Most Recent):  Temp: 98.4 °F (36.9 °C) (12/07/22 0000)  Pulse: 79 (12/07/22 0330)  Resp: 20 (12/07/22 0330)  BP: 111/66 (12/07/22 0300)  SpO2: (!) 94 % (12/07/22 0330)    Body mass index is 25.21 kg/m².  Weight: 79.7 kg (175 lb 11.3 oz) Vital Signs (24h Range):  Temp:  [97.8 °F (36.6 °C)-99.1 °F (37.3 °C)] 98.4 °F (36.9 °C)  Pulse:  [] 79  Resp:  [14-33] 20  SpO2:  [91 %-98 %] 94 %  BP: ()/(49-96) 111/66     Physical Exam  Constitutional:       General: He is not in acute distress.     Appearance: He is not ill-appearing, toxic-appearing or diaphoretic.   HENT:      Head: Normocephalic and atraumatic.      Mouth/Throat:      Mouth: Mucous membranes are moist.   Eyes:      General: No scleral icterus.     Pupils: Pupils are equal, round, and reactive to light.   Neck:      Vascular: No JVD.   Cardiovascular:      Rate and Rhythm: Normal rate.      Pulses: Normal pulses.      Heart sounds: No murmur heard.    Friction rub present. No gallop.   Pulmonary:      Effort: No respiratory distress.      Breath sounds: No wheezing, rhonchi or rales.      Comments: Mediastinal CTs and pacer wire in place, no air leak on CT.  Abdominal:      General: Abdomen is flat. There is no distension.      Palpations: Abdomen is soft.      Tenderness: There is no abdominal tenderness. There is no guarding or rebound.   Musculoskeletal:      Cervical back:  No rigidity.      Right lower leg: No edema.      Left lower leg: No edema.   Skin:     General: Skin is warm and dry.      Coloration: Skin is not pale.      Comments: Sternal dressing in place, clean intact and dry, ace wrap on LLE clean and dry.   Neurological:      General: No focal deficit present.      Mental Status: He is oriented to person, place, and time.         Lines/Drains/Airways       Central Venous Catheter Line  Duration             Percutaneous Central Line Insertion/Assessment - Double Lumen  12/05/22 1246 right internal jugular 1 day              Drain  Duration                  Chest Tube 12/05/22 1441 Tube - 1 Right Mediastinal 28 Fr. 1 day         Chest Tube 12/05/22 1456 Tube - 2 Left Mediastinal 28 Fr. 1 day         Chest Tube 12/05/22 1458 Tube - 3 Left Midaxillary 28 Fr. 1 day              Arterial Line  Duration             Arterial Line 12/05/22 1150 Left Radial 1 day                    Significant Labs:    Lab Results   Component Value Date    WBC 10.2 12/07/2022    HGB 9.3 (L) 12/07/2022    HCT 27.2 (L) 12/07/2022    MCV 93.8 12/07/2022    PLT 82 (L) 12/07/2022         BMP  Lab Results   Component Value Date     (L) 12/07/2022    K 3.7 12/07/2022    CO2 19 (L) 12/07/2022    BUN 14.5 12/07/2022    CREATININE 1.14 12/07/2022    CALCIUM 7.2 (L) 12/07/2022    EGFRNONAA >60 08/18/2021       ABG  Recent Labs   Lab 12/05/22  1515 12/05/22  1719 12/05/22 2104   PH 7.369   < > 7.35   PO2 430*   < > 144*   PCO2 43.1   < > 39   HCO3 24.8   < > 21.5   BE 0  --   --     < > = values in this interval not displayed.         Mechanical Ventilation Support:  Vent Mode: CPAP PSV (12/05/22 2102)  Set Rate: 8 BPM (12/05/22 1959)  Vt Set: 500 mL (12/05/22 1959)  Pressure Support: 10 cmH20 (12/05/22 2102)  PEEP/CPAP: 5 cmH20 (12/05/22 2102)  Oxygen Concentration (%): 30 (12/05/22 2102)  Peak Airway Pressure: 16 cmH20 (12/05/22 2102)  Total Ve: 9.1 L/m (12/05/22 2102)  F/VT Ratio<105 (RSBI): (!) 21.74  (12/05/22 2102)    Significant Imaging:  I have reviewed the pertinent imaging within the past 24 hours.        Assessment/Plan:     Assessment  1. CAD s/p CABG x3  with left internal mammary artery to the left anterior descending, saphenous venous graft to diagonal, and saphenous venous graft to posterior descending artery.  2. Pacer dependent in OR  3. PMH of recent NSTEMI, HFpEF (50%), HTN, HLD, GERD    Plan  - Admitted to ICU for ongoing monitoring post-CABG  - Chest tube management per CVTS  - F/U AM labs and imaging   - Off all drips  - Will discuss stepping down to floor status      DVT Prophylaxis: Will defer to CT surgery team    GI Prophylaxis: Will defer to CT surgery team      32 minutes of critical care was time spent personally by me on the following activities: development of treatment plan with patient or surrogate and bedside caregivers, discussions with consultants, evaluation of patient's response to treatment, examination of patient, ordering and performing treatments and interventions, ordering and review of laboratory studies, ordering and review of radiographic studies, pulse oximetry, re-evaluation of patient's condition.  This critical care time did not overlap with that of any other provider or involve time for any procedures.     Jayna Carrera MD  Pulmonary Critical Care Medicine  [facility] - [facility] Services

## 2022-12-07 NOTE — PROGRESS NOTES
CT Surgery  Progress Note    Subjective:  Off pacing yesterday morning with asystole, hooked back up to pacer  Not requiring any gtts  Good UOP overnight > 30cc/hr   Feeling better this morning, sitting up in chair    Objective:  Temp:  [97.8 °F (36.6 °C)-99.1 °F (37.3 °C)] 98.4 °F (36.9 °C)  Pulse:  [] 79  Resp:  [14-33] 20  SpO2:  [91 %-98 %] 94 %  BP: ()/(49-96) 111/66    Physical Exam:  Gen: no acute distress. Alert and oriented x3.  HEENT: normocephalic and atraumatic. EOMI. R IJ CVL  Resp: unlabored respirations  CV: regular rate, mediastinal tubes and CT in place with serosang output, pacers in place, Incision with dressings C/D/I  Abd: soft, NT, ND  Ext: warm and well perfused  MSK: normal range of motion, normal strength  Neuro: no focal deficits, normal sensation    I/O:  UOP 2.7L  Mediastinal tubes 180cc  L CT 460cc    Labs:  H/H 9/27  WBC 10  PLT 82    BUN/Cr 14/1.14  K 3.7    Imaging:  CXR pending    Assessment & Plan:  77 y.o. male with a hx of HTN, HLD, CAD s/p CABG x3 LIMA-LAD, SVG-D1 & PDA, and GERD.    Neuro: Alert and oriented. Continue pain control with PO narcotics with IV breakthru.  Respiratory: Doing well on RA, Continue IS  CV: V-pacing at 80, HDS off of epi with MAPs 66-80s  -Amlodipine, ASA, metoprolol, statin, losartin  Renal: Good UOP 2.7L with BUN/Cr 14/1.14  FEN/GI: Regular diet, replete electrolytes PRN.   Heme: H/H 9/27, plt 82. Continue to monitor.   ID: WBC 10. Afebrile. No current signs of infection.  Endo: Continue Accucheks. Sliding scale insulin as needed.  Wounds/MSK: Turn pt q2 hours. OOB to chiar    Ppx: Lovenox for DVT ppx, pepcid for GI ppx.    Courtney Benitez MD  [employer] General Surgery - PGY4

## 2022-12-08 ENCOUNTER — ANESTHESIA EVENT (OUTPATIENT)
Dept: SURGERY | Facility: HOSPITAL | Age: 77
DRG: 236 | End: 2022-12-08
Payer: MEDICARE

## 2022-12-08 ENCOUNTER — ANESTHESIA (OUTPATIENT)
Dept: SURGERY | Facility: HOSPITAL | Age: 77
DRG: 236 | End: 2022-12-08
Payer: MEDICARE

## 2022-12-08 LAB
ANION GAP SERPL CALC-SCNC: 6 MEQ/L
BASOPHILS # BLD AUTO: 0.03 X10(3)/MCL (ref 0–0.2)
BASOPHILS NFR BLD AUTO: 0.3 %
BUN SERPL-MCNC: 18.6 MG/DL (ref 8.4–25.7)
CALCIUM SERPL-MCNC: 7.5 MG/DL (ref 8.8–10)
CHLORIDE SERPL-SCNC: 106 MMOL/L (ref 98–107)
CO2 SERPL-SCNC: 21 MMOL/L (ref 23–31)
CREAT SERPL-MCNC: 1.15 MG/DL (ref 0.73–1.18)
CREAT/UREA NIT SERPL: 16
EOSINOPHIL # BLD AUTO: 0.03 X10(3)/MCL (ref 0–0.9)
EOSINOPHIL NFR BLD AUTO: 0.3 %
ERYTHROCYTE [DISTWIDTH] IN BLOOD BY AUTOMATED COUNT: 13.3 % (ref 11.5–17)
GFR SERPLBLD CREATININE-BSD FMLA CKD-EPI: >60 MLS/MIN/1.73/M2
GLUCOSE SERPL-MCNC: 127 MG/DL (ref 82–115)
HCT VFR BLD AUTO: 27.1 % (ref 42–52)
HCT VFR BLD AUTO: 32.7 % (ref 42–52)
HGB BLD-MCNC: 11 GM/DL (ref 14–18)
HGB BLD-MCNC: 9.3 GM/DL (ref 14–18)
IMM GRANULOCYTES # BLD AUTO: 0.06 X10(3)/MCL (ref 0–0.04)
IMM GRANULOCYTES NFR BLD AUTO: 0.6 %
LYMPHOCYTES # BLD AUTO: 1.79 X10(3)/MCL (ref 0.6–4.6)
LYMPHOCYTES NFR BLD AUTO: 17.8 %
MCH RBC QN AUTO: 32.6 PG (ref 27–31)
MCHC RBC AUTO-ENTMCNC: 34.3 MG/DL (ref 33–36)
MCV RBC AUTO: 95.1 FL (ref 80–94)
MONOCYTES # BLD AUTO: 1.28 X10(3)/MCL (ref 0.1–1.3)
MONOCYTES NFR BLD AUTO: 12.7 %
NEUTROPHILS # BLD AUTO: 6.9 X10(3)/MCL (ref 2.1–9.2)
NEUTROPHILS NFR BLD AUTO: 68.3 %
NRBC BLD AUTO-RTO: 0 %
PLATELET # BLD AUTO: 98 X10(3)/MCL (ref 130–400)
PMV BLD AUTO: 10.5 FL (ref 7.4–10.4)
POTASSIUM SERPL-SCNC: 3.8 MMOL/L (ref 3.5–5.1)
RBC # BLD AUTO: 2.85 X10(6)/MCL (ref 4.7–6.1)
SODIUM SERPL-SCNC: 133 MMOL/L (ref 136–145)
WBC # SPEC AUTO: 10.1 X10(3)/MCL (ref 4.5–11.5)

## 2022-12-08 PROCEDURE — 37000009 HC ANESTHESIA EA ADD 15 MINS: Performed by: THORACIC SURGERY (CARDIOTHORACIC VASCULAR SURGERY)

## 2022-12-08 PROCEDURE — 25000003 PHARM REV CODE 250

## 2022-12-08 PROCEDURE — 20000000 HC ICU ROOM

## 2022-12-08 PROCEDURE — 85018 HEMOGLOBIN: CPT

## 2022-12-08 PROCEDURE — 63600175 PHARM REV CODE 636 W HCPCS

## 2022-12-08 PROCEDURE — 33208 PR INSER HART PACER XVENOUS ATR/VENTR: ICD-10-PCS | Mod: 78,,, | Performed by: THORACIC SURGERY (CARDIOTHORACIC VASCULAR SURGERY)

## 2022-12-08 PROCEDURE — 80048 BASIC METABOLIC PNL TOTAL CA: CPT

## 2022-12-08 PROCEDURE — C1785 PMKR, DUAL, RATE-RESP: HCPCS | Performed by: THORACIC SURGERY (CARDIOTHORACIC VASCULAR SURGERY)

## 2022-12-08 PROCEDURE — 36000707: Performed by: THORACIC SURGERY (CARDIOTHORACIC VASCULAR SURGERY)

## 2022-12-08 PROCEDURE — 85025 COMPLETE CBC W/AUTO DIFF WBC: CPT

## 2022-12-08 PROCEDURE — C1898 LEAD, PMKR, OTHER THAN TRANS: HCPCS | Performed by: THORACIC SURGERY (CARDIOTHORACIC VASCULAR SURGERY)

## 2022-12-08 PROCEDURE — 85014 HEMATOCRIT: CPT

## 2022-12-08 PROCEDURE — 71000039 HC RECOVERY, EACH ADD'L HOUR: Performed by: THORACIC SURGERY (CARDIOTHORACIC VASCULAR SURGERY)

## 2022-12-08 PROCEDURE — 25000003 PHARM REV CODE 250: Performed by: INTERNAL MEDICINE

## 2022-12-08 PROCEDURE — 37000008 HC ANESTHESIA 1ST 15 MINUTES: Performed by: THORACIC SURGERY (CARDIOTHORACIC VASCULAR SURGERY)

## 2022-12-08 PROCEDURE — 63600175 PHARM REV CODE 636 W HCPCS: Performed by: ANESTHESIOLOGY

## 2022-12-08 PROCEDURE — 36415 COLL VENOUS BLD VENIPUNCTURE: CPT

## 2022-12-08 PROCEDURE — 71000033 HC RECOVERY, INTIAL HOUR: Performed by: THORACIC SURGERY (CARDIOTHORACIC VASCULAR SURGERY)

## 2022-12-08 PROCEDURE — 25000003 PHARM REV CODE 250: Performed by: NURSE ANESTHETIST, CERTIFIED REGISTERED

## 2022-12-08 PROCEDURE — 33208 INSRT HEART PM ATRIAL & VENT: CPT | Mod: 78,,, | Performed by: THORACIC SURGERY (CARDIOTHORACIC VASCULAR SURGERY)

## 2022-12-08 PROCEDURE — 36000706: Performed by: THORACIC SURGERY (CARDIOTHORACIC VASCULAR SURGERY)

## 2022-12-08 PROCEDURE — 63600175 PHARM REV CODE 636 W HCPCS: Performed by: NURSE ANESTHETIST, CERTIFIED REGISTERED

## 2022-12-08 DEVICE — LEAD NOVUS CAPSURE FIX 52CM: Type: IMPLANTABLE DEVICE | Site: CHEST | Status: FUNCTIONAL

## 2022-12-08 DEVICE — LEAD NOVUS CAPSURE FIX 45CM: Type: IMPLANTABLE DEVICE | Site: CHEST | Status: FUNCTIONAL

## 2022-12-08 DEVICE — IMPLANTABLE DEVICE: Type: IMPLANTABLE DEVICE | Site: CHEST | Status: FUNCTIONAL

## 2022-12-08 RX ORDER — LIDOCAINE HYDROCHLORIDE 10 MG/ML
INJECTION INFILTRATION; PERINEURAL
Status: DISPENSED
Start: 2022-12-08 | End: 2022-12-08

## 2022-12-08 RX ORDER — EPHEDRINE SULFATE 50 MG/ML
INJECTION, SOLUTION INTRAVENOUS
Status: DISCONTINUED | OUTPATIENT
Start: 2022-12-08 | End: 2022-12-08

## 2022-12-08 RX ORDER — FENTANYL CITRATE 50 UG/ML
INJECTION, SOLUTION INTRAMUSCULAR; INTRAVENOUS
Status: DISCONTINUED | OUTPATIENT
Start: 2022-12-08 | End: 2022-12-08

## 2022-12-08 RX ORDER — HYDROMORPHONE HYDROCHLORIDE 2 MG/ML
0.2 INJECTION, SOLUTION INTRAMUSCULAR; INTRAVENOUS; SUBCUTANEOUS EVERY 5 MIN PRN
Status: DISCONTINUED | OUTPATIENT
Start: 2022-12-08 | End: 2022-12-15 | Stop reason: HOSPADM

## 2022-12-08 RX ORDER — LIDOCAINE HYDROCHLORIDE 20 MG/ML
INJECTION INTRAVENOUS
Status: DISCONTINUED | OUTPATIENT
Start: 2022-12-08 | End: 2022-12-08

## 2022-12-08 RX ORDER — FAMOTIDINE 10 MG/ML
20 INJECTION INTRAVENOUS ONCE
Status: CANCELLED | OUTPATIENT
Start: 2022-12-08

## 2022-12-08 RX ORDER — FENTANYL CITRATE 50 UG/ML
25 INJECTION, SOLUTION INTRAMUSCULAR; INTRAVENOUS EVERY 5 MIN PRN
Status: DISCONTINUED | OUTPATIENT
Start: 2022-12-08 | End: 2022-12-09

## 2022-12-08 RX ORDER — PROPOFOL 10 MG/ML
VIAL (ML) INTRAVENOUS
Status: DISCONTINUED | OUTPATIENT
Start: 2022-12-08 | End: 2022-12-08

## 2022-12-08 RX ORDER — ACETAMINOPHEN 10 MG/ML
1000 INJECTION, SOLUTION INTRAVENOUS ONCE
Status: COMPLETED | OUTPATIENT
Start: 2022-12-08 | End: 2022-12-08

## 2022-12-08 RX ORDER — HEPARIN 100 UNIT/ML
SYRINGE INTRAVENOUS
Status: DISPENSED
Start: 2022-12-08 | End: 2022-12-08

## 2022-12-08 RX ORDER — LIDOCAINE HYDROCHLORIDE 10 MG/ML
1 INJECTION, SOLUTION EPIDURAL; INFILTRATION; INTRACAUDAL; PERINEURAL ONCE
Status: CANCELLED | OUTPATIENT
Start: 2022-12-08 | End: 2022-12-08

## 2022-12-08 RX ORDER — PHENYLEPHRINE HCL IN 0.9% NACL 1 MG/10 ML
SYRINGE (ML) INTRAVENOUS
Status: DISCONTINUED | OUTPATIENT
Start: 2022-12-08 | End: 2022-12-08

## 2022-12-08 RX ORDER — SODIUM CHLORIDE 0.9 % (FLUSH) 0.9 %
10 SYRINGE (ML) INJECTION
Status: DISCONTINUED | OUTPATIENT
Start: 2022-12-08 | End: 2022-12-15 | Stop reason: HOSPADM

## 2022-12-08 RX ADMIN — PROPOFOL 20 MG: 10 INJECTION, EMULSION INTRAVENOUS at 03:12

## 2022-12-08 RX ADMIN — Medication 50 MCG: at 01:12

## 2022-12-08 RX ADMIN — KETOROLAC TROMETHAMINE 15 MG: 30 INJECTION, SOLUTION INTRAMUSCULAR at 11:12

## 2022-12-08 RX ADMIN — FAMOTIDINE 20 MG: 10 INJECTION, SOLUTION INTRAVENOUS at 08:12

## 2022-12-08 RX ADMIN — SUCRALFATE 1 G: 1 TABLET ORAL at 05:12

## 2022-12-08 RX ADMIN — FENTANYL CITRATE 25 MCG: 50 INJECTION, SOLUTION INTRAMUSCULAR; INTRAVENOUS at 03:12

## 2022-12-08 RX ADMIN — ENOXAPARIN SODIUM 40 MG: 40 INJECTION SUBCUTANEOUS at 05:12

## 2022-12-08 RX ADMIN — KETOROLAC TROMETHAMINE 15 MG: 30 INJECTION, SOLUTION INTRAMUSCULAR at 06:12

## 2022-12-08 RX ADMIN — ATORVASTATIN CALCIUM 80 MG: 40 TABLET, FILM COATED ORAL at 08:12

## 2022-12-08 RX ADMIN — EPHEDRINE SULFATE 2.5 MG: 50 INJECTION INTRAVENOUS at 02:12

## 2022-12-08 RX ADMIN — FENTANYL CITRATE 25 MCG: 50 INJECTION, SOLUTION INTRAMUSCULAR; INTRAVENOUS at 02:12

## 2022-12-08 RX ADMIN — EPHEDRINE SULFATE 2.5 MG: 50 INJECTION INTRAVENOUS at 03:12

## 2022-12-08 RX ADMIN — PROPOFOL 90 MG: 10 INJECTION, EMULSION INTRAVENOUS at 01:12

## 2022-12-08 RX ADMIN — LIDOCAINE HYDROCHLORIDE 100 MG: 20 INJECTION INTRAVENOUS at 01:12

## 2022-12-08 RX ADMIN — ACETAMINOPHEN 1000 MG: 10 INJECTION, SOLUTION INTRAVENOUS at 05:12

## 2022-12-08 RX ADMIN — FENTANYL CITRATE 25 MCG: 50 INJECTION, SOLUTION INTRAMUSCULAR; INTRAVENOUS at 01:12

## 2022-12-08 RX ADMIN — EPHEDRINE SULFATE 2.5 MG: 50 INJECTION INTRAVENOUS at 01:12

## 2022-12-08 RX ADMIN — MUPIROCIN: 20 OINTMENT TOPICAL at 08:12

## 2022-12-08 RX ADMIN — Medication 50 MCG: at 02:12

## 2022-12-08 RX ADMIN — DEXTROSE MONOHYDRATE 2 G: 50 INJECTION, SOLUTION INTRAVENOUS at 03:12

## 2022-12-08 RX ADMIN — PROPOFOL 20 MG: 10 INJECTION, EMULSION INTRAVENOUS at 04:12

## 2022-12-08 RX ADMIN — PROPOFOL 40 MG: 10 INJECTION, EMULSION INTRAVENOUS at 02:12

## 2022-12-08 RX ADMIN — MUPIROCIN: 20 OINTMENT TOPICAL at 09:12

## 2022-12-08 RX ADMIN — METOPROLOL TARTRATE 12.5 MG: 25 TABLET, FILM COATED ORAL at 08:12

## 2022-12-08 RX ADMIN — SODIUM CHLORIDE, SODIUM GLUCONATE, SODIUM ACETATE, POTASSIUM CHLORIDE AND MAGNESIUM CHLORIDE: 526; 502; 368; 37; 30 INJECTION, SOLUTION INTRAVENOUS at 01:12

## 2022-12-08 NOTE — PROGRESS NOTES
CT Surgery  Progress Note    Subjective:  Vpacing at 80  Not requiring any gtts  Good UOP   Feeling better this morning, sitting up in chair    Objective:  Temp:  [97.8 °F (36.6 °C)-98.8 °F (37.1 °C)] 98 °F (36.7 °C)  Pulse:  [] 86  Resp:  [17-38] 19  SpO2:  [94 %-98 %] 95 %  BP: ()/(62-80) 122/70    Physical Exam:  Gen: no acute distress. Alert and oriented x3.  HEENT: normocephalic and atraumatic. EOMI. R IJ CVL  Resp: unlabored respirations  CV: regular rate, mediastinal tubes and CT in place with serosang output, pacers in place, Incision with dressings C/D/I  Abd: soft, NT, ND  Ext: warm and well perfused  MSK: normal range of motion, normal strength  Neuro: no focal deficits, normal sensation    I/O:  UOP1.7L  Mediastinal tubes 250cc  L CT 110cc    Labs:  H/H 9/27  WBC 10  PLT 98    BUN/Cr 18/1.15  K 3.8    Imaging:  CXR pending    Assessment & Plan:  77 y.o. male with a hx of HTN, HLD, CAD s/p CABG x3 requiring vpacing    Neuro: Alert and oriented. Continue pain control with PO narcotics with IV breakthru.  Respiratory: Doing well on RA, Continue IS  CV: V-pacing at 80, HDS off of epi with MAPs 66-80s  -Amlodipine, ASA, metoprolol, statin, losartan  -Pacemaker placement today with Dr. Ricks  Renal: BUN/Cr 18/1.15  FEN/GI: Regular diet after procedure, replete electrolytes PRN.   Heme: H/H stable Continue to monitor.   ID: WBC 10. Afebrile. No current signs of infection.  Endo: Continue Accucheks. Sliding scale insulin as needed.  Wounds/MSK: Turn pt q2 hours. OOB to chiar    Ppx: Lovenox for DVT ppx, pepcid for GI ppx.    Courtney Benitez MD  [employer] General Surgery - PGY4

## 2022-12-08 NOTE — OP NOTE
[facility]                       [address] [place] [id number]    PATIENT NAME:      RAMON GIRALDO   YOB: 1945  CSN:               [id number]  MRN:               [id number]  ADMIT DATE:        12/01/2022 21:39:00  PHYSICIAN:         Vini Ricks MD                          OPERATIVE REPORT      DATE OF SURGERY:    12/08/2022 00:00:00    SURGEON:  Vini Ricks MD    PREOPERATIVE DIAGNOSIS:  Asymptomatic bradycardia.    PROCEDURE:  Placement of dual-chamber pacemaker.    POSTOPERATIVE DIAGNOSIS:  Asymptomatic bradycardia.    ASSISTANT:  Keagan Olson.    TECHNIQUE:  Under informed consent, patient was taken to the OR in supine   position.  General anesthesia was induced and therefore maintained for the   remainder of procedure.  All pressure points were padded equally.  Skin of chest   was prepped and draped in usual sterile fashion.  IV antibiotics administered.    A small incision was made over the anterior chest.  A pocket was made.  I   gained access to the subclavian vein twice and wire was advanced under   fluoroscopy into the SVC.  Dilator introducer was advanced over the first wire,   followed by introduction of a ventricular lead.  This was positioned in the   right ventricle.  It tested very well.  It was deployed.  The same was repeated   for the atrial lead that also was positioned very well and deployed.  These were   connected to the pacemaker after securing the leads to the anterior fascia.    The pocket was irrigated thoroughly.  The wound was closed in layers of   absorbable suture.    Patient tolerated the procedure well.  He was transferred to the recovery room   in a stable condition.        ______________________________  Vini Ricks MD MAA/AQS  DD:  12/08/2022  Time:  04:04PM  DT:  12/08/2022  Time:  04:44PM  Job #:  [id number]      OPERATIVE REPORT

## 2022-12-08 NOTE — ANESTHESIA PREPROCEDURE EVALUATION
12/08/2022  Suresh Aragon is a 77 y.o., male.      Pre-op Assessment    I have reviewed the Patient Summary Reports.     I have reviewed the Nursing Notes. I have reviewed the NPO Status.   I have reviewed the Medications.     Review of Systems  Anesthesia Hx:  No problems with previous Anesthesia    Social:  Non-Smoker    Cardiovascular:   Hypertension Denies MI.  CABG/stent (CABG v4v 1 week ago)  Angina  Denies CHF.    Pulmonary:   Denies COPD.  Denies Asthma.    Renal/:   Denies Chronic Renal Disease. no renal calculi     Hepatic/GI:   GERD Denies Liver Disease. Denies Hepatitis.    Neurological:   Denies CVA. Denies Seizures.    Endocrine:   Denies Diabetes. Denies Hypothyroidism. Denies Hyperthyroidism.  Denies Obesity / BMI > 30      Physical Exam  General: Well nourished, Cooperative, Alert and Oriented    Airway:  Mallampati: I   Mouth Opening: Normal  TM Distance: Normal  Tongue: Normal  Neck ROM: Normal ROM    Dental:        Anesthesia Plan  Type of Anesthesia, risks & benefits discussed:    Anesthesia Type: Gen Supraglottic Airway  Intra-op Monitoring Plan: Standard ASA Monitors  Induction:  IV  Informed Consent: Informed consent signed with the Patient and all parties understand the risks and agree with anesthesia plan.  All questions answered.   ASA Score: 3  Day of Surgery Review of History & Physical: H&P Update referred to the surgeon/provider.    Ready For Surgery From Anesthesia Perspective.     .

## 2022-12-08 NOTE — NURSING
Nurses Note -- 4 Eyes      12/8/2022   5:51 AM      Skin assessed during: Daily Assessment      [x] No Pressure Injuries Present    []Prevention Measures Documented      [] Yes- Altered Skin Integrity Present or Discovered   [] LDA Added if Not in Epic (Describe Wound)   [] New Altered Skin Integrity was Present on Admit and Documented in LDA   [] Wound Image Taken    Wound Care Consulted? No    Attending Nurse:  Rick Robledo RN     Second RN/Staff Member:  Ravi Ya RN

## 2022-12-08 NOTE — TRANSFER OF CARE
"Anesthesia Transfer of Care Note    Patient: Suresh Aragon    Procedure(s) Performed: Procedure(s) (LRB):  INSERTION, CARDIAC PACEMAKER (N/A)    Patient location: PACU    Anesthesia Type: general    Transport from OR: Transported from OR on 100% O2 by closed face mask with adequate spontaneous ventilation    Post pain: adequate analgesia    Post assessment: no apparent anesthetic complications and tolerated procedure well    Post vital signs: stable    Level of consciousness: awake    Nausea/Vomiting: no nausea/vomiting    Complications: none    Transfer of care protocol was followed      Last vitals:   Visit Vitals  /65   Pulse 86   Temp 37 °C (98.6 °F) (Oral)   Resp 17   Ht 5' 10" (1.778 m)   Wt 80.2 kg (176 lb 12.9 oz)   SpO2 97%   BMI 25.37 kg/m²     "

## 2022-12-08 NOTE — PROGRESS NOTES
Pulmonary Critical Care   Progress note    Patient Name: Suresh Aragon  MRN: [id number]  Admission Date: 12/1/2022  Hospital Length of Stay: 3 days  Code Status: Full Code  Attending Provider: Yair Benson MD  Primary Care Provider: No primary care provider on file.     Subjective:     HPI:   Suresh Aragon is a 77 y.o. male with a hx of HTN, HLD, CAD s/p CABG x3 LIMA-LAD, SVG-D1 & PDA, and GERD. Patient presents to the ER on 12/01/2022 with complaints of chest pain.  Of note patient was just seen in clinic by Dr. Martinez where he was advised to present to ER if had chest pain secondary to unstable angina prior to his scheduled outpatient LHC.  Patient stated the pain started approximately 7 days prior.  He stated the pain feels like a pressure and radiates down to his left arm.  He said the pain is worse with exertion and improves with rest.  He also had associated shortness of breath with exertion.  Initial troponin was negative however repeat troponins are now mildly elevated.  Initial EKG revealed normal sinus rhythm with a left axis deviation and LVH.  [facility] has admitted the patient for unstable angina. He had a LHC on 12/2/22 showing severe multivessel CAD and was referred for CABG. Following successful CABG with 56 minutes on bypass with 34 minutes aortic clamping he was easily taken off but pt was found to have minimal electrical activity and was pacer dependent. He remained stable and intubated and transferred to ICU.    Hospital Course/Significant events:  12.3.22: NAD noted. VSS. SR on tele. Denies CP/SOB/Palps.  Right groin benign.  12.4.22: NAD noted. VSS. SR on tele. Denies CP/SOB/Palps. Awaiting CABG.  12/5/22: s/p CABG, ETT, precedex, epinephrine, paced, insulin gtt at 4, bear hugger, fluids  Extubated on 12/5.  12/6 Pt had sinus arrest with asystole and code was activated. Patient recovered spontaneously without CPR or epinephrine    24 Hour Interval History:  No acute events overnight. Pt satting on room  air. Insertion of cardiac pacer today with Dr. Ricks.     No past medical history on file.    Past Surgical History:   Procedure Laterality Date    ANGIOGRAM, CORONARY, WITH LEFT HEART CATHETERIZATION N/A 12/2/2022    Procedure: Angiogram, Coronary, with Left Heart Cath;  Surgeon: Candido Martinez MD;  Location: [facility] CATH LAB;  Service: Cardiology;  Laterality: N/A;    CORONARY ARTERY BYPASS GRAFT (CABG) N/A 12/5/2022    Procedure: CORONARY ARTERY BYPASS GRAFT (CABG);  Surgeon: Vini Ricks MD;  Location: [facility] OR;  Service: Cardiothoracic;  Laterality: N/A;  Case #3       Social History     Socioeconomic History    Marital status:      Social Determinants of Health     Financial Resource Strain: Low Risk     Difficulty of Paying Living Expenses: Not hard at all   Food Insecurity: No Food Insecurity    Worried About Running Out of Food in the Last Year: Never true    Ran Out of Food in the Last Year: Never true   Transportation Needs: No Transportation Needs    Lack of Transportation (Medical): No    Lack of Transportation (Non-Medical): No   Social Connections: Unknown    Marital Status:    Housing Stability: Unknown    Unable to Pay for Housing in the Last Year: No           Current Outpatient Medications   Medication Instructions    amLODIPine (NORVASC) 5 mg, Oral, Daily    pantoprazole (PROTONIX) 40 mg, Oral, Daily    tadalafiL (CIALIS) 5 mg, Oral, Daily    traMADoL (ULTRAM) 50 mg, Oral, 2 times daily PRN    traZODone (DESYREL) 100 mg, Oral, Nightly       Current Inpatient Medications   heparin, porcine (PF)        LIDOcaine HCL 10 mg/ml (1%)        amLODIPine  5 mg Oral Daily    aspirin  81 mg Oral Daily    atorvastatin  80 mg Oral QHS    docusate sodium  100 mg Oral BID    enoxaparin  40 mg Subcutaneous Daily    famotidine (PF)  20 mg Intravenous Daily    folic acid  1 mg Oral Daily    ketorolac  15 mg Intravenous Q6H    losartan  25 mg Oral Daily    metoprolol tartrate  12.5 mg Oral BID     mupirocin   Topical (Top) Daily    mupirocin   Nasal BID    perflutren lipid microspheres  1.3 mL Intravenous Once    sucralfate  1 g Oral Q6H       Current Intravenous Infusions   clevidipine Stopped (12/05/22 1700)    dexmedetomidine (PRECEDEX) infusion Stopped (12/05/22 2200)    dextrose 5 % and 0.45 % NaCl 100 mL/hr at 12/07/22 0200    EPINEPHrine 0.01 mcg/kg/min (12/05/22 2300)    insulin regular 1 units/mL infusion orderable (CTS POST-OP) 0 Units/hr (12/06/22 0630)    loperamide           Objective:       Intake/Output Summary (Last 24 hours) at 12/8/2022 0435  Last data filed at 12/7/2022 2142  Gross per 24 hour   Intake --   Output 2010 ml   Net -2010 ml           Vital Signs (Most Recent):  Temp: 98 °F (36.7 °C) (12/08/22 0400)  Pulse: 86 (12/08/22 0400)  Resp: 19 (12/08/22 0400)  BP: 122/70 (12/08/22 0400)  SpO2: 95 % (12/08/22 0400)    Body mass index is 25.34 kg/m².  Weight: 80.1 kg (176 lb 9.4 oz) Vital Signs (24h Range):  Temp:  [97.8 °F (36.6 °C)-98.8 °F (37.1 °C)] 98 °F (36.7 °C)  Pulse:  [] 86  Resp:  [17-38] 19  SpO2:  [92 %-98 %] 95 %  BP: ()/(62-80) 122/70     Physical Exam  Vitals reviewed.   Constitutional:       General: He is not in acute distress.     Appearance: He is not ill-appearing, toxic-appearing or diaphoretic.      Comments: Pt sitting up in chair.   HENT:      Head: Normocephalic and atraumatic.      Mouth/Throat:      Mouth: Mucous membranes are moist.   Eyes:      General: No scleral icterus.     Pupils: Pupils are equal, round, and reactive to light.   Neck:      Vascular: No JVD.   Cardiovascular:      Rate and Rhythm: Normal rate.      Pulses: Normal pulses.      Heart sounds: No murmur heard.    No gallop.   Pulmonary:      Effort: No respiratory distress.      Breath sounds: No wheezing, rhonchi or rales.      Comments: Mediastinal CTs and pacer wire in place, no air leak on CT.  Abdominal:      General: Abdomen is flat. There is no distension.      Palpations:  Abdomen is soft.      Tenderness: There is no abdominal tenderness. There is no guarding or rebound.   Musculoskeletal:      Cervical back: No rigidity.      Right lower leg: No edema.      Left lower leg: No edema.   Skin:     General: Skin is warm and dry.      Coloration: Skin is not pale.      Comments: Sternal dressing in place, clean intact and dry.   Neurological:      General: No focal deficit present.      Mental Status: He is oriented to person, place, and time.         Lines/Drains/Airways       Central Venous Catheter Line  Duration             Percutaneous Central Line Insertion/Assessment - Double Lumen  12/05/22 1246 right internal jugular 2 days              Drain  Duration                  Chest Tube 12/05/22 1441 Tube - 1 Right Mediastinal 28 Fr. 2 days         Chest Tube 12/05/22 1456 Tube - 2 Left Mediastinal 28 Fr. 2 days         Chest Tube 12/05/22 1458 Tube - 3 Left Midaxillary 28 Fr. 2 days                    Significant Labs:    Lab Results   Component Value Date    WBC 10.1 12/08/2022    HGB 9.3 (L) 12/08/2022    HCT 27.1 (L) 12/08/2022    MCV 95.1 (H) 12/08/2022    PLT 98 (L) 12/08/2022         BMP  Lab Results   Component Value Date     (L) 12/08/2022    K 3.8 12/08/2022    CO2 21 (L) 12/08/2022    BUN 18.6 12/08/2022    CREATININE 1.15 12/08/2022    CALCIUM 7.5 (L) 12/08/2022    EGFRNONAA >60 08/18/2021       ABG  Recent Labs   Lab 12/05/22  1515 12/05/22  1719 12/05/22 2104   PH 7.369   < > 7.35   PO2 430*   < > 144*   PCO2 43.1   < > 39   HCO3 24.8   < > 21.5   BE 0  --   --     < > = values in this interval not displayed.         Mechanical Ventilation Support:  Vent Mode: CPAP PSV (12/05/22 2102)  Set Rate: 8 BPM (12/05/22 1959)  Vt Set: 500 mL (12/05/22 1959)  Pressure Support: 10 cmH20 (12/05/22 2102)  PEEP/CPAP: 5 cmH20 (12/05/22 2102)  Oxygen Concentration (%): 30 (12/05/22 2102)  Peak Airway Pressure: 16 cmH20 (12/05/22 2102)  Total Ve: 9.1 L/m (12/05/22 2102)  F/VT  Ratio<105 (RSBI): (!) 21.74 (12/05/22 2102)    Significant Imaging:  I have reviewed the pertinent imaging within the past 24 hours.        Assessment/Plan:     Assessment  1. CAD s/p CABG x3  with left internal mammary artery to the left anterior descending, saphenous venous graft to diagonal, and saphenous venous graft to posterior descending artery.  2. Pacer dependent in OR  3. PMH of recent NSTEMI, HFpEF (50%), HTN, HLD, GERD    Plan  - Admitted to ICU for ongoing monitoring post-CABG  - Chest tube management per CVTS  - Cardiac pacer insertion 12/8  - F/U AM labs and imaging   - Off all drips  - Will discuss stepping down to floor status      DVT Prophylaxis: Will defer to CT surgery team    GI Prophylaxis: Will defer to CT surgery team      32 minutes of critical care was time spent personally by me on the following activities: development of treatment plan with patient or surrogate and bedside caregivers, discussions with consultants, evaluation of patient's response to treatment, examination of patient, ordering and performing treatments and interventions, ordering and review of laboratory studies, ordering and review of radiographic studies, pulse oximetry, re-evaluation of patient's condition.  This critical care time did not overlap with that of any other provider or involve time for any procedures.     Jayna Carrera MD  Pulmonary Critical Care Medicine  [facility]

## 2022-12-08 NOTE — ANESTHESIA POSTPROCEDURE EVALUATION
Anesthesia Post Evaluation    Patient: Suresh Aragon    Procedure(s) Performed: Procedure(s) (LRB):  INSERTION, CARDIAC PACEMAKER (N/A)    Final Anesthesia Type: general      Patient location during evaluation: PACU  Patient participation: Yes- Able to Participate  Level of consciousness: awake and alert  Post-procedure vital signs: reviewed and stable  Pain management: adequate  Airway patency: patent  DEVEN mitigation strategies: Multimodal analgesia  PONV status at discharge: No PONV  Anesthetic complications: no      Cardiovascular status: blood pressure returned to baseline and hemodynamically stable  Respiratory status: unassisted  Hydration status: euvolemic  Follow-up not needed.          Vitals Value Taken Time   /66 12/08/22 1621   Temp 36 °C (96.8 °F) 12/08/22 1611   Pulse 88 12/08/22 1628   Resp 17 12/08/22 1628   SpO2 100 % 12/08/22 1628   Vitals shown include unvalidated device data.      No case tracking events are documented in the log.      Pain/Marilee Score: Pain Rating Prior to Med Admin: 3 (12/8/2022 11:40 AM)

## 2022-12-08 NOTE — PROGRESS NOTES
[facility] - Observation Unit  Cardiology  Progress Note    Patient Name: Suresh Aragon  MRN: [id number]  Admission Date: 12/1/2022  Hospital Length of Stay: 3 days  Code Status: Full Code   Attending Physician: Yair Benson MD   Primary Care Physician: No primary care provider on file.  Expected Discharge Date:   Principal Problem:Unstable angina    Subjective:     Brief HPI:   This is a 77-year-old male, who is known to Dr. Martinez, with a history of HTN, HLD, GERD, angina.  Patient presents to the ER on 12/01/2022 with complaints of chest pain.  Of note patient was just seen in clinic by Dr. Martinez where he was advised to present to ER if had chest pain secondary to unstable angina prior to his scheduled outpatient LHC.  Patient stated the pain started approximately 7 days prior.  He stated the pain feels like a pressure and radiates down to his left arm.  He said the pain is worse with exertion and improves with rest.  He also had associated shortness of breath with exertion.  Initial troponin was negative however repeat troponins are now mildly elevated.  Initial EKG revealed normal sinus rhythm with a left axis deviation and LVH.  [facility] has admitted the patient for unstable angina.     Hospital Course:   12.3.22: NAD noted. VSS. SR on tele. Denies CP/SOB/Palps.  Right groin benign.  12.4.22: NAD noted. VSS. SR on tele. Denies CP/SOB/Palps. Awaiting CABG.  12.5.22: NAD noted. VSS. SR on tele. NPO for CABG today.  12.6.22:  Upon rounds this a.m. patient was in junctional rhythm with heart rate in the 90s.  Patient then stated he felt anxious and had sinus arrest with asystole.  Code was activated.  Patient was placed back in bed once patient in bed he recovered spontaneously without CPR or epinephrine.  Patient was placed back on external pacing.  Rate was attempted to be turned down to see underlying rhythm however patient continued to require pacing even pacer is lowest rate.  Upon recovery patient began  vomiting and complained of feeling flushed.  Vital signs stable, no vasopressors required.  12.7.22: NAD noted. VSS. Paced on tele. VSS. Denies SOB/Palps, +incisional CP.  12.8.22: NAD noted. VSS. Paced on tele. Denies SOB/Palps, +incisional CP. NPO for PPM today.    PMH: HTN, HLD, GERD, angina, Prostate cancer  PSH:   appendectomy, prostate surgery  Social History:  Denies EtOH, tobacco, and illicit drug use  Family History:  Father-CAD, MI, HTN     Previous Cardiac Diagnostics:   Echo limited 12.6.22  Limited echo done by the bedside with physician post cardiac arrest; patient had open heart surgery 12/05/2022; definity used to evaluate LV ejection fraction for pacemaker evaluation.    The visually estimated ejection fraction is 35-40%.    Moderately decreased systolic function    Echo 12.3.22  Technically difficult study with limited visualization.  Definity used.    The estimated ejection fraction is 50%.    Left ventricle is normal in size with low-normal systolic function.    Grade 1 left ventricular diastolic dysfunction.    Carotid US 12.3.22  The right internal carotid artery demonstrated no hemodynamically significant stenosis   Left internal carotid artery demonstrated less than 50% stenosis.    Bilateral vertebral arteries were patent with antegrade flow    [facility] 12.2.22  1. LEFT MAIN:  MILD CALCIFICATION BUT NO OBSTRUCTIVE DISEASE.  2. LAD:  THE LAD SHOWS HEAVY CALCIFICATION PROXIMALLY.  THERE IS EVIDENCE OF A 95% STENOSIS PROXIMALLY INVOLVING THE 1ST DIAGONAL.  THE 1ST DIAGONAL SHOWS 95% STENOSIS PROXIMALLY.  THE MID LAD SHOWS 90% STENOSIS.  3. LEFT CIRCUMFLEX:  90% PROXIMAL STENOSIS IS NOTED  4. RIGHT CORONARY ARTERY:  THE RIGHT CORONARY ARTERY SHOWS A LONG, VERY DISTAL 90% LESION WHICH COMPROMISES THE FLOW INTO THE PDA     GRAFT ANGIOGRAPHY:  1. LIMA TO THE LEFT SIDE OF THE CHEST WALL: WIDELY PATENT WITH NO DISEASE  2.  MECHE TO THE RIGHT SIDE OF THE CHEST WALL: WIDELY PATENT WITH NO DISEASE           Review of Systems   Constitutional: Negative for chills and fever.   Cardiovascular:  Positive for chest pain. Negative for palpitations.        Incisional   Respiratory:  Negative for shortness of breath.    Psychiatric/Behavioral:  Negative for altered mental status.          Objective:     Vital Signs (Most Recent):  Temp: 98.8 °F (37.1 °C) (12/08/22 0800)  Pulse: 90 (12/08/22 0936)  Resp: (!) 25 (12/08/22 0936)  BP: 133/89 (12/08/22 0902)  SpO2: 97 % (12/08/22 0936) Vital Signs (24h Range):  Temp:  [97.8 °F (36.6 °C)-98.8 °F (37.1 °C)] 98.8 °F (37.1 °C)  Pulse:  [] 90  Resp:  [17-36] 25  SpO2:  [93 %-98 %] 97 %  BP: (102-154)/(64-89) 133/89     Weight: 80.2 kg (176 lb 12.9 oz)  Body mass index is 25.37 kg/m².    SpO2: 97 %  O2 Device (Oxygen Therapy): room air      Intake/Output Summary (Last 24 hours) at 12/8/2022 1056  Last data filed at 12/8/2022 0547  Gross per 24 hour   Intake 200 ml   Output 1760 ml   Net -1560 ml         Lines/Drains/Airways       Central Venous Catheter Line  Duration             Percutaneous Central Line Insertion/Assessment - Double Lumen  12/05/22 1246 right internal jugular 2 days              Drain  Duration                  Chest Tube 12/05/22 1441 Tube - 1 Right Mediastinal 28 Fr. 2 days         Chest Tube 12/05/22 1456 Tube - 2 Left Mediastinal 28 Fr. 2 days         Chest Tube 12/05/22 1458 Tube - 3 Left Midaxillary 28 Fr. 2 days                    Significant Labs: CMP   Recent Labs   Lab 12/07/22 0152 12/08/22 0139   * 133*   K 3.7 3.8   CO2 19* 21*   BUN 14.5 18.6   CREATININE 1.14 1.15   CALCIUM 7.2* 7.5*     , CBC   Recent Labs   Lab 12/07/22 0152 12/08/22 0139   WBC 10.2 10.1   HGB 9.3* 9.3*   HCT 27.2* 27.1*   PLT 82* 98*     , and Troponin   Recent Labs   Lab 12/06/22  1559 12/06/22  2153   TROPONINI 2.586* 2.104*         Telemetry:  Paced    Physical Exam:  Physical Exam  Constitutional:       Appearance: Normal appearance.   HENT:      Head:  Normocephalic.   Eyes:      Extraocular Movements: Extraocular movements intact.      Conjunctiva/sclera: Conjunctivae normal.   Cardiovascular:      Rate and Rhythm: Normal rate and regular rhythm.      Pulses: Normal pulses.      Heart sounds: Normal heart sounds.      Comments: R Groin Soft/Flat, Non-Tender, No Sign of Bleed/Infection. +2 BLE Palpable Pedal Pulses  Paced rhythm    Pulmonary:      Effort: Pulmonary effort is normal.      Breath sounds: Normal breath sounds.   Abdominal:      Palpations: Abdomen is soft.   Musculoskeletal:         General: Normal range of motion.      Cervical back: Neck supple.   Skin:     General: Skin is warm and dry.      Comments: Midline sternotomy dressing CDI.+CT in place.  Pacer wires in place   Neurological:      Mental Status: He is alert and oriented to person, place, and time. Mental status is at baseline.   Psychiatric:         Mood and Affect: Mood normal.         Behavior: Behavior normal.       Current Inpatient Medications:    Current Facility-Administered Medications:     0.9%  NaCl infusion (for blood administration), , Intravenous, Q24H PRN, Vini Ricks MD    acetaminophen oral solution 650 mg, 650 mg, Per OG tube, Q6H PRN, MARISOL Roca    albumin human 5% bottle 12.5 g, 12.5 g, Intravenous, PRN, MARISOL Rcoa, Stopped at 12/06/22 0820    amLODIPine tablet 5 mg, 5 mg, Oral, Daily, Candido Martinez MD    aspirin EC tablet 81 mg, 81 mg, Oral, Daily, MARISOL Roca, 81 mg at 12/07/22 0840    atorvastatin tablet 80 mg, 80 mg, Oral, QHS, Candido Martinez MD, 80 mg at 12/07/22 2029    calcium gluconate 1 g in NS IVPB (premixed), 1 g, Intravenous, PRN, MARISOL Roca    calcium gluconate 1 g in NS IVPB (premixed), 2 g, Intravenous, PRN, MARISOL Roca    calcium gluconate 1 g in NS IVPB (premixed), 3 g, Intravenous, PRN, MARISOL Roca    cefazolin (ANCEF) 2 gram in dextrose 5% 50 mL IVPB (premix), 2 g, Intravenous, On Call Procedure, MARISOL Brown     cefUROXime sodium 1.5 g in dextrose 5 % in water (D5W) 5 % 100 mL IVPB (MB+), 1.5 g, Intravenous, On Call Procedure, Vini Ricks MD    clevidipine (CLEVIPREX) 25 mg/50 mL infusion, 0-21 mg/hr, Intravenous, Continuous, MARISOL Roca, Stopped at 12/05/22 1700    dexmedetomidine (PRECEDEX) 400mcg/100mL 0.9% NaCL infusion, 0-1.4 mcg/kg/hr, Intravenous, Continuous, MARISOL Roca, Stopped at 12/05/22 2200    dextrose 10 % infusion, 12.5 g, Intravenous, PRN, MARISOL Roca    dextrose 10 % infusion, 25 g, Intravenous, PRN, MARISOL Roca    dextrose 5 % and 0.45 % NaCl infusion, , Intravenous, Continuous, MARISOL Roca, Last Rate: 100 mL/hr at 12/07/22 0200, New Bag at 12/07/22 0200    docusate sodium capsule 100 mg, 100 mg, Oral, BID, MARISOL Roca, 100 mg at 12/07/22 2029    enoxaparin injection 40 mg, 40 mg, Subcutaneous, Daily, MARISOL Roca, 40 mg at 12/07/22 1723    EPINEPHrine (ADRENALIN) 5 mg in dextrose 5 % 250 mL infusion, 0-2 mcg/kg/min (Dosing Weight), Intravenous, Continuous, MARISOL Roca, Last Rate: 2.3 mL/hr at 12/05/22 2300, 0.01 mcg/kg/min at 12/05/22 2300    famotidine (PF) injection 20 mg, 20 mg, Intravenous, Daily, MARISOL Roca, 20 mg at 12/08/22 0806    folic acid tablet 1 mg, 1 mg, Oral, Daily, MARISOL Roca, 1 mg at 12/07/22 0840    heparin, porcine (PF) (heparin flush 100 units/mL) 100 unit/mL injection flush, , , ,     HYDROcodone-acetaminophen 5-325 mg per tablet 1 tablet, 1 tablet, Oral, Q4H PRN, MARISOL Roca    insulin regular in 0.9 % NaCl 100 unit/100 mL (1 unit/mL) infusion, 0-52 Units/hr, Intravenous, Continuous, MARISOL Roca, Last Rate: 0 mL/hr at 12/06/22 0630, 0 Units/hr at 12/06/22 0630    ketorolac injection 15 mg, 15 mg, Intravenous, Q6H, MARISOL Roca, 15 mg at 12/08/22 0600    lactulose 10 gram/15 ml solution 20 g, 20 g, Oral, Q6H PRN, MARISOL Roca    LIDOcaine HCL 10 mg/ml (1%) 10 mg/mL (1 %) injection, , , ,     loperamide capsule 2 mg, 2 mg, Oral, Continuous  PRN, MARISOL Roca    losartan tablet 25 mg, 25 mg, Oral, Daily, Candido Martinez MD, 25 mg at 12/04/22 0903    magnesium sulfate 2g in water 50mL IVPB (premix), 2 g, Intravenous, PRN, MARISOL Roca    magnesium sulfate 2g in water 50mL IVPB (premix), 4 g, Intravenous, PRN, MARISOL Roca    melatonin tablet 6 mg, 6 mg, Oral, Nightly PRN, Candido Martinez MD, 6 mg at 12/07/22 2137    metoclopramide HCl injection 5 mg, 5 mg, Intravenous, Q6H PRN, MARISOL Roca    metoprolol tartrate (LOPRESSOR) split tablet 12.5 mg, 12.5 mg, Oral, BID, MARISOL Roca    morphine injection 4 mg, 4 mg, Intravenous, Q4H PRN, MARISOL Roca, 4 mg at 12/06/22 1226    mupirocin 2 % ointment, , Nasal, On Call Procedure, MARISOL Brown    mupirocin 2 % ointment, , Topical (Top), Daily, Yair Benson MD, Given at 12/06/22 0900    mupirocin 2 % ointment, , Nasal, BID, MARISOL Roca, Given at 12/07/22 2029    nitroGLYCERIN SL tablet, , , PRN, Candido Martinez MD, 0.1 mg at 12/02/22 1754    ondansetron injection 4 mg, 4 mg, Intravenous, Q4H PRN, MARISOL Roca, 4 mg at 12/06/22 0945    oxyCODONE immediate release tablet 10 mg, 10 mg, Oral, Q4H PRN, MARISOL Roca, 10 mg at 12/06/22 0208    perflutren lipid microspheres injection 1.3 mL, 1.3 mL, Intravenous, Once, Rhys Samaniego MD    potassium chloride 20 mEq in 100 mL IVPB (FOR CENTRAL LINE ADMINISTRATION ONLY), 20 mEq, Intravenous, PRN, MARISOL Roca, Last Rate: 50 mL/hr at 12/05/22 1847, 20 mEq at 12/05/22 1847    potassium chloride 20 mEq in 100 mL IVPB (FOR CENTRAL LINE ADMINISTRATION ONLY), 60 mEq, Intravenous, PRN, MARISOL Roca    potassium chloride 40 mEq in 100 mL IVPB (FOR CENTRAL LINE ADMINISTRATION ONLY), 40 mEq, Intravenous, PRN, MARISOL Roca    promethazine injection 25 mg, 25 mg, Intramuscular, Q4H PRN, Yair Benson MD, 25 mg at 12/06/22 1030    simethicone chewable tablet 80 mg, 1 tablet, Oral, TID PRN, Jayna Carrera MD, 80 mg at 12/07/22 1122    sodium chloride  0.9% flush 10 mL, 10 mL, Intravenous, PRN, Candido Martinez MD    sodium phosphate 15 mmol in dextrose 5 % 250 mL IVPB, 15 mmol, Intravenous, PRN, MARISOL Roca    sodium phosphate 20.01 mmol in dextrose 5 % 250 mL IVPB, 20.01 mmol, Intravenous, PRN, MARISOL Roca    sodium phosphate 30 mmol in dextrose 5 % 250 mL IVPB, 30 mmol, Intravenous, PRN, MARISOL Roca    sucralfate tablet 1 g, 1 g, Oral, Q6H, Candido Martinez MD, 1 g at 12/07/22 6272        Assessment:     IMPRESSION:  NSTEMI type I  MV CAD/CABG x 3 (LIMA To LAD, SVG to Diag, SVG to RPDA)  Sinus Arrest leading to cardiac arrest    -patient was junctional,then has sinus arresst which lead to asystole  SSS  ICMO    -EF 35-40%  Angina  HTN  HLD  GERD  No Hx of GI Bleed      Plan:     PLAN:  Continue ASA, high intensity statin,   Right groin precautions/activity restrictions  Hold BB and ARB for now  Plan for device today with Dr. Ricks- probably pacemaker not AICD at this point  Continue temp pacing      Nehemias Santos [facility]  Cardiology  [facility] - Observation Unit  12/08/2022

## 2022-12-09 LAB
ALBUMIN SERPL-MCNC: 2.9 GM/DL (ref 3.4–4.8)
ALBUMIN/GLOB SERPL: 1.2 RATIO (ref 1.1–2)
ALP SERPL-CCNC: 62 UNIT/L (ref 40–150)
ALT SERPL-CCNC: 18 UNIT/L (ref 0–55)
AST SERPL-CCNC: 16 UNIT/L (ref 5–34)
BASOPHILS # BLD AUTO: 0.07 X10(3)/MCL (ref 0–0.2)
BASOPHILS NFR BLD AUTO: 0.6 %
BILIRUBIN DIRECT+TOT PNL SERPL-MCNC: 1.7 MG/DL
BUN SERPL-MCNC: 18.7 MG/DL (ref 8.4–25.7)
CALCIUM SERPL-MCNC: 8 MG/DL (ref 8.8–10)
CHLORIDE SERPL-SCNC: 108 MMOL/L (ref 98–107)
CO2 SERPL-SCNC: 23 MMOL/L (ref 23–31)
CREAT SERPL-MCNC: 1.01 MG/DL (ref 0.73–1.18)
EOSINOPHIL # BLD AUTO: 0.23 X10(3)/MCL (ref 0–0.9)
EOSINOPHIL NFR BLD AUTO: 2.1 %
ERYTHROCYTE [DISTWIDTH] IN BLOOD BY AUTOMATED COUNT: 13.7 % (ref 11.5–17)
GFR SERPLBLD CREATININE-BSD FMLA CKD-EPI: >60 MLS/MIN/1.73/M2
GLOBULIN SER-MCNC: 2.5 GM/DL (ref 2.4–3.5)
GLUCOSE SERPL-MCNC: 139 MG/DL (ref 82–115)
HCT VFR BLD AUTO: 29.7 % (ref 42–52)
HGB BLD-MCNC: 10 GM/DL (ref 14–18)
IMM GRANULOCYTES # BLD AUTO: 0.09 X10(3)/MCL (ref 0–0.04)
IMM GRANULOCYTES NFR BLD AUTO: 0.8 %
LYMPHOCYTES # BLD AUTO: 2.4 X10(3)/MCL (ref 0.6–4.6)
LYMPHOCYTES NFR BLD AUTO: 21.7 %
MAGNESIUM SERPL-MCNC: 2.2 MG/DL (ref 1.6–2.6)
MCH RBC QN AUTO: 32.5 PG (ref 27–31)
MCHC RBC AUTO-ENTMCNC: 33.7 MG/DL (ref 33–36)
MCV RBC AUTO: 96.4 FL (ref 80–94)
MONOCYTES # BLD AUTO: 1.43 X10(3)/MCL (ref 0.1–1.3)
MONOCYTES NFR BLD AUTO: 12.9 %
NEUTROPHILS # BLD AUTO: 6.9 X10(3)/MCL (ref 2.1–9.2)
NEUTROPHILS NFR BLD AUTO: 61.9 %
NRBC BLD AUTO-RTO: 0 %
PHOSPHATE SERPL-MCNC: 2.1 MG/DL (ref 2.3–4.7)
PLATELET # BLD AUTO: 158 X10(3)/MCL (ref 130–400)
PMV BLD AUTO: 9.6 FL (ref 7.4–10.4)
POTASSIUM SERPL-SCNC: 4 MMOL/L (ref 3.5–5.1)
PROT SERPL-MCNC: 5.4 GM/DL (ref 5.8–7.6)
RBC # BLD AUTO: 3.08 X10(6)/MCL (ref 4.7–6.1)
SODIUM SERPL-SCNC: 139 MMOL/L (ref 136–145)
WBC # SPEC AUTO: 11.1 X10(3)/MCL (ref 4.5–11.5)

## 2022-12-09 PROCEDURE — 80053 COMPREHEN METABOLIC PANEL: CPT | Performed by: STUDENT IN AN ORGANIZED HEALTH CARE EDUCATION/TRAINING PROGRAM

## 2022-12-09 PROCEDURE — 97110 THERAPEUTIC EXERCISES: CPT

## 2022-12-09 PROCEDURE — 97166 OT EVAL MOD COMPLEX 45 MIN: CPT

## 2022-12-09 PROCEDURE — 84100 ASSAY OF PHOSPHORUS: CPT | Performed by: STUDENT IN AN ORGANIZED HEALTH CARE EDUCATION/TRAINING PROGRAM

## 2022-12-09 PROCEDURE — 25000003 PHARM REV CODE 250: Performed by: INTERNAL MEDICINE

## 2022-12-09 PROCEDURE — 36415 COLL VENOUS BLD VENIPUNCTURE: CPT | Performed by: STUDENT IN AN ORGANIZED HEALTH CARE EDUCATION/TRAINING PROGRAM

## 2022-12-09 PROCEDURE — 83735 ASSAY OF MAGNESIUM: CPT | Performed by: STUDENT IN AN ORGANIZED HEALTH CARE EDUCATION/TRAINING PROGRAM

## 2022-12-09 PROCEDURE — 63600175 PHARM REV CODE 636 W HCPCS

## 2022-12-09 PROCEDURE — 20000000 HC ICU ROOM

## 2022-12-09 PROCEDURE — 25000003 PHARM REV CODE 250

## 2022-12-09 PROCEDURE — 94760 N-INVAS EAR/PLS OXIMETRY 1: CPT

## 2022-12-09 PROCEDURE — 85025 COMPLETE CBC W/AUTO DIFF WBC: CPT | Performed by: STUDENT IN AN ORGANIZED HEALTH CARE EDUCATION/TRAINING PROGRAM

## 2022-12-09 PROCEDURE — 25000003 PHARM REV CODE 250: Performed by: NURSE PRACTITIONER

## 2022-12-09 RX ORDER — BISACODYL 10 MG
10 SUPPOSITORY, RECTAL RECTAL DAILY PRN
Status: DISCONTINUED | OUTPATIENT
Start: 2022-12-09 | End: 2022-12-15 | Stop reason: HOSPADM

## 2022-12-09 RX ORDER — ONDANSETRON 4 MG/1
8 TABLET, ORALLY DISINTEGRATING ORAL EVERY 8 HOURS PRN
Status: CANCELLED | OUTPATIENT
Start: 2022-12-09

## 2022-12-09 RX ADMIN — FOLIC ACID 1 MG: 1 TABLET ORAL at 08:12

## 2022-12-09 RX ADMIN — SIMETHICONE 80 MG: 80 TABLET, CHEWABLE ORAL at 02:12

## 2022-12-09 RX ADMIN — HYDROCODONE BITARTRATE AND ACETAMINOPHEN 1 TABLET: 5; 325 TABLET ORAL at 06:12

## 2022-12-09 RX ADMIN — ATORVASTATIN CALCIUM 80 MG: 40 TABLET, FILM COATED ORAL at 08:12

## 2022-12-09 RX ADMIN — MELATONIN TAB 3 MG 6 MG: 3 TAB at 12:12

## 2022-12-09 RX ADMIN — ASPIRIN 81 MG: 81 TABLET, COATED ORAL at 08:12

## 2022-12-09 RX ADMIN — SUCRALFATE 1 G: 1 TABLET ORAL at 05:12

## 2022-12-09 RX ADMIN — METOPROLOL TARTRATE 12.5 MG: 25 TABLET, FILM COATED ORAL at 08:12

## 2022-12-09 RX ADMIN — SUCRALFATE 1 G: 1 TABLET ORAL at 11:12

## 2022-12-09 RX ADMIN — ENOXAPARIN SODIUM 40 MG: 40 INJECTION SUBCUTANEOUS at 05:12

## 2022-12-09 RX ADMIN — MUPIROCIN: 20 OINTMENT TOPICAL at 09:12

## 2022-12-09 RX ADMIN — DOCUSATE SODIUM 100 MG: 100 CAPSULE, LIQUID FILLED ORAL at 08:12

## 2022-12-09 RX ADMIN — SUCRALFATE 1 G: 1 TABLET ORAL at 12:12

## 2022-12-09 RX ADMIN — HYDROCODONE BITARTRATE AND ACETAMINOPHEN 1 TABLET: 5; 325 TABLET ORAL at 04:12

## 2022-12-09 RX ADMIN — BISACODYL 10 MG: 10 SUPPOSITORY RECTAL at 05:12

## 2022-12-09 RX ADMIN — MUPIROCIN: 20 OINTMENT TOPICAL at 08:12

## 2022-12-09 RX ADMIN — AMLODIPINE BESYLATE 5 MG: 5 TABLET ORAL at 08:12

## 2022-12-09 RX ADMIN — LACTULOSE 20 G: 10 SOLUTION ORAL at 04:12

## 2022-12-09 RX ADMIN — FAMOTIDINE 20 MG: 10 INJECTION, SOLUTION INTRAVENOUS at 08:12

## 2022-12-09 RX ADMIN — HYDROCODONE BITARTRATE AND ACETAMINOPHEN 1 TABLET: 5; 325 TABLET ORAL at 01:12

## 2022-12-09 NOTE — PT/OT/SLP PROGRESS
Chart reviewed. OT attempted at 1159, pt declined , stated he had just transferred back to bed , was in chair since 8am. Reported ambulated with cardiac rehab this am. Requesting OT to return this afternoon. Nursing notified

## 2022-12-09 NOTE — PLAN OF CARE
Unable to speak to pt about rehab placement. Pt states he is having significant abdominal pain. His care team is aware. Will follow up.

## 2022-12-09 NOTE — PT/OT/SLP PROGRESS
Physical Therapy      Patient Name:  Suresh Aragon   MRN:  [id number]    Attempted to see pt for PT eval however upon PT arrival pt had just transferred back to bed after attempting to mobilize with OT. Pt reporting increased abdominal discomfort. RN present in room and aware of situation. Positioned pt to be as comfortable as possible and left with all needs in reach. Will follow-up tomorrow.

## 2022-12-09 NOTE — PLAN OF CARE
Problem: Occupational Therapy  Goal: Occupational Therapy Goal  Description: Pt will complete UE dressing mod I  Pt will complete LE dressing mod I   Pt will complete functional transfers mod I  Pt will demonstrate competency sternal precautions for all ADL tasks and mobility mod I.  Pt will complete toileting mod I    Outcome: Ongoing, Progressing

## 2022-12-09 NOTE — NURSING
Nurses Note -- 4 Eyes      12/8/2022   9:19 PM      Skin assessed during: Q Shift Change      [x] No Pressure Injuries Present    []Prevention Measures Documented      [] Yes- Altered Skin Integrity Present or Discovered   [] LDA Added if Not in Epic (Describe Wound)   [] New Altered Skin Integrity was Present on Admit and Documented in LDA   [] Wound Image Taken    Wound Care Consulted? No    Attending Nurse:  Darek Soler RN     Second RN/Staff Member:  Ludmila Henao RN

## 2022-12-09 NOTE — PROGRESS NOTES
Pulmonary Critical Care   Progress note    Patient Name: Suresh Aargon  MRN: [id number]  Admission Date: 12/1/2022  Hospital Length of Stay: 4 days  Code Status: Full Code  Attending Provider: Yair Benson MD  Primary Care Provider: No primary care provider on file.     Subjective:     HPI:   Suresh Aragon is a 77 y.o. male with a hx of HTN, HLD, CAD s/p CABG x3 LIMA-LAD, SVG-D1 & PDA, and GERD. Patient presents to the ER on 12/01/2022 with complaints of chest pain.  Of note patient was just seen in clinic by Dr. Martinez where he was advised to present to ER if had chest pain secondary to unstable angina prior to his scheduled outpatient LHC.  [facility] admitted the patient for unstable angina. He had a LHC on 12/2/22 showing severe multivessel CAD and was referred for CABG. Following successful CABG with 56 minutes on bypass with 34 minutes aortic clamping he was easily taken off but pt was found to have minimal electrical activity and was pacer dependent. He experienced sinus arrest/asystole of POD 1, and was subsequently taken for a PPM placement on 12/8.     Hospital Course/Significant events:  12.3.22: NAD noted. VSS. SR on tele. Denies CP/SOB/Palps.  Right groin benign.  12.4.22: NAD noted. VSS. SR on tele. Denies CP/SOB/Palps. Awaiting CABG.  12/5/22: s/p CABG, ETT, precedex, epinephrine, paced, insulin gtt at 4, bear hugger, fluids  Extubated on 12/5.  12/6 Pt had sinus arrest with asystole and code was activated. Patient recovered spontaneously without CPR or epinephrine    24 Hour Interval History:  POD 4 from CABG and POD 1 from PPM. AFVSS, he did have one episode of left shoulder pain over night which he reports is his typical cardiac related pain, which resolved after Norco 5mg. No cardiac related complaints today, he was just very concerned about one of his vessels being ungraftable, some b/l hip bruising, b/l arm swelling, and the thinning of his skin.  Good 24 UOP 1.8L Mediastinal CT w/ 360cc outpt. CT removed  this morning.     No past medical history on file.    Past Surgical History:   Procedure Laterality Date    ANGIOGRAM, CORONARY, WITH LEFT HEART CATHETERIZATION N/A 12/2/2022    Procedure: Angiogram, Coronary, with Left Heart Cath;  Surgeon: Candido Martinez MD;  Location: [facility] CATH LAB;  Service: Cardiology;  Laterality: N/A;    CORONARY ARTERY BYPASS GRAFT (CABG) N/A 12/5/2022    Procedure: CORONARY ARTERY BYPASS GRAFT (CABG);  Surgeon: Vini Ricks MD;  Location: [facility] OR;  Service: Cardiothoracic;  Laterality: N/A;  Case #3       Social History     Socioeconomic History    Marital status:      Social Determinants of Health     Financial Resource Strain: Low Risk     Difficulty of Paying Living Expenses: Not hard at all   Food Insecurity: No Food Insecurity    Worried About Running Out of Food in the Last Year: Never true    Ran Out of Food in the Last Year: Never true   Transportation Needs: No Transportation Needs    Lack of Transportation (Medical): No    Lack of Transportation (Non-Medical): No   Social Connections: Unknown    Marital Status:    Housing Stability: Unknown    Unable to Pay for Housing in the Last Year: No           Current Outpatient Medications   Medication Instructions    amLODIPine (NORVASC) 5 mg, Oral, Daily    pantoprazole (PROTONIX) 40 mg, Oral, Daily    tadalafiL (CIALIS) 5 mg, Oral, Daily    traMADoL (ULTRAM) 50 mg, Oral, 2 times daily PRN    traZODone (DESYREL) 100 mg, Oral, Nightly       Current Inpatient Medications   amLODIPine  5 mg Oral Daily    aspirin  81 mg Oral Daily    atorvastatin  80 mg Oral QHS    docusate sodium  100 mg Oral BID    enoxaparin  40 mg Subcutaneous Daily    famotidine (PF)  20 mg Intravenous Daily    folic acid  1 mg Oral Daily    losartan  25 mg Oral Daily    metoprolol tartrate  12.5 mg Oral BID    mupirocin   Topical (Top) Daily    mupirocin   Nasal BID    perflutren lipid microspheres  1.3 mL Intravenous Once    sucralfate  1 g Oral Q6H        Current Intravenous Infusions   clevidipine Stopped (12/05/22 1700)    dexmedetomidine (PRECEDEX) infusion Stopped (12/05/22 2200)    dextrose 5 % and 0.45 % NaCl 100 mL/hr at 12/07/22 0200    EPINEPHrine 0.01 mcg/kg/min (12/05/22 2300)    insulin regular 1 units/mL infusion orderable (CTS POST-OP) 0 Units/hr (12/06/22 0630)    loperamide         ROS: 14 pt ROS negative except as noted in HPI    Objective:       Intake/Output Summary (Last 24 hours) at 12/9/2022 0944  Last data filed at 12/9/2022 0623  Gross per 24 hour   Intake 700 ml   Output 1825 ml   Net -1125 ml           Vital Signs (Most Recent):  Temp: 98.6 °F (37 °C) (12/09/22 0400)  Pulse: 90 (12/09/22 0400)  Resp: 18 (12/09/22 0618)  BP: 114/72 (12/09/22 0400)  SpO2: 95 % (12/09/22 0400)    Body mass index is 25.31 kg/m².  Weight: 80 kg (176 lb 5.9 oz) Vital Signs (24h Range):  Temp:  [96.8 °F (36 °C)-98.6 °F (37 °C)] 98.6 °F (37 °C)  Pulse:  [] 90  Resp:  [16-33] 18  SpO2:  [95 %-100 %] 95 %  BP: (101-156)/() 114/72     Physical Exam  Vitals reviewed.   Constitutional:       General: He is not in acute distress.     Appearance: Normal appearance. He is not ill-appearing, toxic-appearing or diaphoretic.      Comments: Pt sitting up in chair.   HENT:      Head: Normocephalic and atraumatic.      Mouth/Throat:      Mouth: Mucous membranes are moist.   Eyes:      General: No scleral icterus.     Pupils: Pupils are equal, round, and reactive to light.   Neck:      Vascular: No JVD.   Cardiovascular:      Rate and Rhythm: Tachycardia present.      Pulses: Normal pulses.      Heart sounds: Normal heart sounds. No murmur heard.    No friction rub. No gallop.   Pulmonary:      Effort: No respiratory distress.      Breath sounds: No wheezing, rhonchi or rales.   Abdominal:      General: Abdomen is flat. There is no distension.      Palpations: Abdomen is soft.      Tenderness: There is no abdominal tenderness. There is no guarding or rebound.    Musculoskeletal:      Cervical back: No rigidity.      Right lower leg: No edema.      Left lower leg: No edema.   Skin:     General: Skin is warm and dry.      Coloration: Skin is not pale.      Comments: Sternotomy incision c/d/I, healing appropriately, no dehiscence or discharge noted.  B/L arm and B/L hip old ecchymosis, healing   Neurological:      General: No focal deficit present.      Mental Status: He is alert and oriented to person, place, and time.         Lines/Drains/Airways       Drain  Duration                  Chest Tube 12/05/22 1441 Tube - 1 Right Mediastinal 28 Fr. 3 days         Chest Tube 12/05/22 1456 Tube - 2 Left Mediastinal 28 Fr. 3 days         Chest Tube 12/05/22 1458 Tube - 3 Left Midaxillary 28 Fr. 3 days              Peripheral Intravenous Line  Duration                  Peripheral IV - Single Lumen 12/09/22 0700 20 G Anterior;Right Forearm <1 day                    Significant Labs:    Lab Results   Component Value Date    WBC 10.1 12/08/2022    HGB 11.0 (L) 12/08/2022    HCT 32.7 (L) 12/08/2022    MCV 95.1 (H) 12/08/2022    PLT 98 (L) 12/08/2022         BMP  Lab Results   Component Value Date     (L) 12/08/2022    K 3.8 12/08/2022    CO2 21 (L) 12/08/2022    BUN 18.6 12/08/2022    CREATININE 1.15 12/08/2022    CALCIUM 7.5 (L) 12/08/2022    EGFRNONAA >60 08/18/2021       ABG  Recent Labs   Lab 12/05/22  1515 12/05/22  1719 12/05/22 2104   PH 7.369   < > 7.35   PO2 430*   < > 144*   PCO2 43.1   < > 39   HCO3 24.8   < > 21.5   BE 0  --   --     < > = values in this interval not displayed.         Mechanical Ventilation Support:  Vent Mode: CPAP PSV (12/05/22 2102)  Set Rate: 8 BPM (12/05/22 1959)  Vt Set: 500 mL (12/05/22 1959)  Pressure Support: 10 cmH20 (12/05/22 2102)  PEEP/CPAP: 5 cmH20 (12/05/22 2102)  Oxygen Concentration (%): 30 (12/05/22 2102)  Peak Airway Pressure: 16 cmH20 (12/05/22 2102)  Total Ve: 9.1 L/m (12/05/22 2102)  F/VT Ratio<105 (RSBI): (!) 21.74 (12/05/22  2102)    Significant Imaging:  I have reviewed the pertinent imaging within the past 24 hours.        Assessment/Plan:     Assessment  1. CAD s/p CABG x3 12/5/2022 (LIMA-LADto the left anterior descending, SVG to diagonal, and SVG to PDA).  2. S/p dual-chamber  PPM 12/8/2022 due to sinus arrest/asystole post-op  3. HF (p 50% --> r 35-40%), HTN, HLD, GERD    Plan  -Admitted to ICU for ongoing monitoring post-CABG  -Evaluated by CVTS this morning, okay to downgrade  -remaining management per cardiology and CVTS  -continue to ambulate, encourage PO intake,  and utilize ISS  -per cards: Continue ASA, high intensity statin, start BB and ARB now)  -Right groin precautions/activity restrictions  -Replete to keep electrolytes Mg > 2, Phos > 3, K >4    DVT Prophylaxis: Will defer to CT surgery team    GI Prophylaxis: famotidine, carafe    CODE STATUS: FULL     32 minutes of critical care was time spent personally by me on the following activities: development of treatment plan with patient or surrogate and bedside caregivers, discussions with consultants, evaluation of patient's response to treatment, examination of patient, ordering and performing treatments and interventions, ordering and review of laboratory studies, ordering and review of radiographic studies, pulse oximetry, re-evaluation of patient's condition.  This critical care time did not overlap with that of any other provider or involve time for any procedures.     Vincent Williamson MD  Pulmonary Critical Care Medicine  [facility]

## 2022-12-09 NOTE — PROGRESS NOTES
CT Surgery  Progress Note    Subjective:  S/p pacemaker insertion  Not requiring any gtts  Good UOP   Feeling good this morning, sitting up in chair    Objective:  Temp:  [96.8 °F (36 °C)-98.8 °F (37.1 °C)] 98.6 °F (37 °C)  Pulse:  [] 90  Resp:  [16-34] 19  SpO2:  [95 %-100 %] 95 %  BP: (101-156)/() 114/72    Physical Exam:  Gen: no acute distress. Alert and oriented x3.  HEENT: normocephalic and atraumatic. EOMI. R IJ CVL  Resp: unlabored respirations  CV: regular rate, mediastinal tubes and CT in place with serosang output, Incision with dressings C/D/I  Abd: soft, NT, ND  Ext: warm and well perfused  MSK: normal range of motion, normal strength  Neuro: no focal deficits, normal sensation    I/O:  UOP 1.4L  Mediastinal tubes 100cc  L CT 240cc    Assessment & Plan:  77 y.o. male with a hx of HTN, HLD, CAD s/p CABG x3 12/5 now s/p pacemaker insertion 12/8.    Neuro: Alert and oriented. Continue pain control with PO narcotics with IV breakthru.  Respiratory: Doing well on RA, Continue IS  CV: HDS off of pressors  -Amlodipine, ASA, metoprolol, statin, losartan  -s/p pacemaker insertion 12/8  Renal: BUN/Cr 18/1.15  FEN/GI: Regular diet, replete electrolytes PRN.   Heme: H/H stable Continue to monitor.   ID: WBC normal. Afebrile. No current signs of infection.  Endo: Continue Accucheks. Sliding scale insulin as needed.  Wounds/MSK: Turn pt q2 hours. OOB to chiar    Ppx: Lovenox for DVT ppx, pepcid for GI ppx.    Courtney Benitez MD  [employer] General Surgery - PGY4

## 2022-12-09 NOTE — PT/OT/SLP EVAL
Occupational Therapy   Evaluation    Name: Suresh Aragon  MRN: [id number]  Admitting Diagnosis: Unstable angina CAD s/p CABG x 3 12/5, s/p pacemaker 12/8  Recent Surgery: Procedure(s) (LRB):  INSERTION, CARDIAC PACEMAKER (N/A) 1 Day Post-Op    Recommendations:     Discharge Recommendations: rehabilitation facility, home with home health (pending progress)  Discharge Equipment Recommendations:     Barriers to discharge:       Assessment:     Suresh Aragon is a 77 y.o. male with a medical diagnosis of Unstable angina.  He presents with decreased activity tolerance, c/o severe abdominal pain hindering tolerance, c/o feeling faint sitting EOB with drop in BP. Performance deficits affecting function: weakness, impaired endurance, impaired self care skills, impaired functional mobility, other (comment) (sternal precautions).      Rehab Prognosis: Good; patient would benefit from acute skilled OT services to address these deficits and reach maximum level of function.       Plan:     Patient to be seen daily to address the above listed problems via self-care/home management, therapeutic exercises, therapeutic activities  Plan of Care Expires:    Plan of Care Reviewed with: patient    Subjective     Chief Complaint: abdominal pain  Patient/Family Comments/goals: to get better, return to PLOF    Occupational Profile:  Living Environment: resides at home with wife,  3 small steps to enter no rail, walk in shower with shower chair  Previous level of function: I, cares for wife whom has dementia, ambulatory without AD  Roles and Routines:   Equipment Used at Home: none  Assistance upon Discharge: none    Pain/Comfort:  Pain Rating 1: 9/10 (pt  c/o severe pain in abdomen, nursing notified)    Patients cultural, spiritual, [employer]ism conflicts given the current situation:      Objective:     Communicated with: RN prior to session.  Patient found HOB elevated with telemetry, pulse ox (continuous) upon OT entry to room.    General  "Precautions: Standard, sternal  Orthopedic Precautions:    Braces:    Respiratory Status: Room air  HR 88, O2 sat 98  Occupational Performance:    Bed Mobility:    Patient completed Rolling/Turning to Left with  minimum assistance  Patient completed Rolling/Turning to Right with minimum assistance  Patient completed Scooting/Bridging with minimum assistance  Patient completed Supine to Sit with moderate assistance and with HOB elevated  Patient completed Sit to Supine with maximal assistance and due to feeling "faint"    Functional Mobility/Transfers:  Unable to assess due to pt c/o severe abdominal pain in sitting, progressed to feeling "faint" after sitting EOB x 10 minutes , unable to obtain BP  Returned to supine BP 98/65, after 3 minutes in supine /64  Pt ambulated with cardiac rehab this am    Activities of Daily Living:  Grooming: minimum assistance .  Lower Body Dressing: moderate assistance .    Cognitive/Visual Perceptual:  Cognitive/Psychosocial Skills:     -       Follows Commands/attention:Follows multistep  commands  -       Mood/Affect/Coping skills/emotional control: Cooperative and Pleasant    Physical Exam:  Balance: -       sitting balance SBA  Upper Extremity Range of Motion:     -       Right Upper Extremity: WFL sternal pxns  -       Left Upper Extremity: WFL sternal and pacemaker      AMPAC 6 Click ADL:  AMPAC Total Score:      Treatment & Education:  Education on OT POC, sternal pxns with ADL tasks    Patient left left sidelying with call button in reach and RN notified    GOALS:   Multidisciplinary Problems       Occupational Therapy Goals          Problem: Occupational Therapy    Goal Priority Disciplines Outcome Interventions   Occupational Therapy Goal     OT, PT/OT Ongoing, Progressing    Description: Pt will complete UE dressing mod I  Pt will complete LE dressing mod I   Pt will complete functional transfers mod I  Pt will demonstrate competency sternal precautions for all ADL " tasks and mobility mod I.  Pt will complete toileting mod I                         History:     No past medical history on file.      Past Surgical History:   Procedure Laterality Date    ANGIOGRAM, CORONARY, WITH LEFT HEART CATHETERIZATION N/A 12/2/2022    Procedure: Angiogram, Coronary, with Left Heart Cath;  Surgeon: Candido Martinez MD;  Location: [facility] CATH LAB;  Service: Cardiology;  Laterality: N/A;    CORONARY ARTERY BYPASS GRAFT (CABG) N/A 12/5/2022    Procedure: CORONARY ARTERY BYPASS GRAFT (CABG);  Surgeon: Vini Ricks MD;  Location: [facility];  Service: Cardiothoracic;  Laterality: N/A;  Case #3    INSERTION OF PACEMAKER N/A 12/8/2022    Procedure: INSERTION, CARDIAC PACEMAKER;  Surgeon: Vini Ricks MD;  Location: [facility];  Service: Cardiology;  Laterality: N/A;  INSERTION OF PERM PACEMAKER //    REP - PAT       Time Tracking:     OT Date of Treatment:    OT Start Time: 1405  OT Stop Time: 1428  OT Total Time (min): 23 min    Billable Minutes:Evaluation mod comp    12/9/2022

## 2022-12-09 NOTE — PROGRESS NOTES
[facility] - Observation Unit  Cardiology  Progress Note    Patient Name: Suresh Aragon  MRN: [id number]  Admission Date: 12/1/2022  Hospital Length of Stay: 4 days  Code Status: Full Code   Attending Physician: Yair Benson MD   Primary Care Physician: No primary care provider on file.  Expected Discharge Date:   Principal Problem:Unstable angina    Subjective:     Brief HPI:   This is a 77-year-old male, who is known to Dr. Martinez, with a history of HTN, HLD, GERD, angina.  Patient presents to the ER on 12/01/2022 with complaints of chest pain.  Of note patient was just seen in clinic by Dr. Martinez where he was advised to present to ER if had chest pain secondary to unstable angina prior to his scheduled outpatient LHC.  Patient stated the pain started approximately 7 days prior.  He stated the pain feels like a pressure and radiates down to his left arm.  He said the pain is worse with exertion and improves with rest.  He also had associated shortness of breath with exertion.  Initial troponin was negative however repeat troponins are now mildly elevated.  Initial EKG revealed normal sinus rhythm with a left axis deviation and LVH.  [facility] has admitted the patient for unstable angina.     Hospital Course:   12.3.22: NAD noted. VSS. SR on tele. Denies CP/SOB/Palps.  Right groin benign.  12.4.22: NAD noted. VSS. SR on tele. Denies CP/SOB/Palps. Awaiting CABG.  12.5.22: NAD noted. VSS. SR on tele. NPO for CABG today.  12.6.22:  Upon rounds this a.m. patient was in junctional rhythm with heart rate in the 90s.  Patient then stated he felt anxious and had sinus arrest with asystole.  Code was activated.  Patient was placed back in bed once patient in bed he recovered spontaneously without CPR or epinephrine.  Patient was placed back on external pacing.  Rate was attempted to be turned down to see underlying rhythm however patient continued to require pacing even pacer is lowest rate.  Upon recovery patient began  vomiting and complained of feeling flushed.  Vital signs stable, no vasopressors required.  12.7.22: NAD noted. VSS. Paced on tele. VSS. Denies SOB/Palps, +incisional CP.  12.8.22: NAD noted. VSS. Paced on tele. Denies SOB/Palps, +incisional CP. NPO for PPM today.  12.9.22: NAD noted. VSS. SR on tele. Denies SOB/Palps, +incisional CP. CT Dcd.    PMH: HTN, HLD, GERD, angina, Prostate cancer  PSH:   appendectomy, prostate surgery  Social History:  Denies EtOH, tobacco, and illicit drug use  Family History:  Father-CAD, MI, HTN     Previous Cardiac Diagnostics:   Echo limited 12.6.22  Limited echo done by the bedside with physician post cardiac arrest; patient had open heart surgery 12/05/2022; definity used to evaluate LV ejection fraction for pacemaker evaluation.    The visually estimated ejection fraction is 35-40%.    Moderately decreased systolic function    Echo 12.3.22  Technically difficult study with limited visualization.  Definity used.    The estimated ejection fraction is 50%.    Left ventricle is normal in size with low-normal systolic function.    Grade 1 left ventricular diastolic dysfunction.    Carotid US 12.3.22  The right internal carotid artery demonstrated no hemodynamically significant stenosis   Left internal carotid artery demonstrated less than 50% stenosis.    Bilateral vertebral arteries were patent with antegrade flow    [facility] 12.2.22  1. LEFT MAIN:  MILD CALCIFICATION BUT NO OBSTRUCTIVE DISEASE.  2. LAD:  THE LAD SHOWS HEAVY CALCIFICATION PROXIMALLY.  THERE IS EVIDENCE OF A 95% STENOSIS PROXIMALLY INVOLVING THE 1ST DIAGONAL.  THE 1ST DIAGONAL SHOWS 95% STENOSIS PROXIMALLY.  THE MID LAD SHOWS 90% STENOSIS.  3. LEFT CIRCUMFLEX:  90% PROXIMAL STENOSIS IS NOTED  4. RIGHT CORONARY ARTERY:  THE RIGHT CORONARY ARTERY SHOWS A LONG, VERY DISTAL 90% LESION WHICH COMPROMISES THE FLOW INTO THE PDA     GRAFT ANGIOGRAPHY:  1. LIMA TO THE LEFT SIDE OF THE CHEST WALL: WIDELY PATENT WITH NO DISEASE  2.   MECHE TO THE RIGHT SIDE OF THE CHEST WALL: WIDELY PATENT WITH NO DISEASE          Review of Systems   Constitutional: Negative for chills and fever.   Cardiovascular:  Positive for chest pain. Negative for palpitations.        Incisional   Respiratory:  Negative for shortness of breath.    Psychiatric/Behavioral:  Negative for altered mental status.          Objective:     Vital Signs (Most Recent):  Temp: 98.2 °F (36.8 °C) (12/09/22 1135)  Pulse: 90 (12/09/22 1132)  Resp: (!) 22 (12/09/22 1132)  BP: 128/66 (12/09/22 1125)  SpO2: 98 % (12/09/22 1132) Vital Signs (24h Range):  Temp:  [96.8 °F (36 °C)-99.3 °F (37.4 °C)] 98.2 °F (36.8 °C)  Pulse:  [] 90  Resp:  [0-33] 22  SpO2:  [95 %-100 %] 98 %  BP: (101-156)/() 128/66     Weight: 80 kg (176 lb 5.9 oz)  Body mass index is 25.31 kg/m².    SpO2: 98 %  (RETIRED) O2 Device (Oxygen Therapy): room air      Intake/Output Summary (Last 24 hours) at 12/9/2022 1153  Last data filed at 12/9/2022 0623  Gross per 24 hour   Intake 700 ml   Output 1825 ml   Net -1125 ml         Lines/Drains/Airways       Peripheral Intravenous Line  Duration                  Peripheral IV - Single Lumen 12/09/22 0700 20 G Anterior;Right Forearm <1 day                    Significant Labs: CMP   Recent Labs   Lab 12/08/22  0139 12/09/22  1024   * 139   K 3.8 4.0   CO2 21* 23   BUN 18.6 18.7   CREATININE 1.15 1.01   CALCIUM 7.5* 8.0*   ALBUMIN  --  2.9*   BILITOT  --  1.7*   ALKPHOS  --  62   AST  --  16   ALT  --  18     , CBC   Recent Labs   Lab 12/08/22  0139 12/08/22  1743 12/09/22  1024   WBC 10.1  --  11.1   HGB 9.3* 11.0* 10.0*   HCT 27.1* 32.7* 29.7*   PLT 98*  --  158     , and Troponin   No results for input(s): TROPONINI in the last 48 hours.      Telemetry:  Paced    Physical Exam:  Physical Exam  Constitutional:       Appearance: Normal appearance.   HENT:      Head: Normocephalic.   Eyes:      Extraocular Movements: Extraocular movements intact.      Conjunctiva/sclera:  Conjunctivae normal.   Cardiovascular:      Rate and Rhythm: Normal rate and regular rhythm.      Pulses: Normal pulses.      Heart sounds: Normal heart sounds.      Comments: R Groin Soft/Flat, Non-Tender, No Sign of Bleed/Infection. +2 BLE Palpable Pedal Pulses  Paced rhythm    Pulmonary:      Effort: Pulmonary effort is normal.      Breath sounds: Normal breath sounds.   Abdominal:      Palpations: Abdomen is soft.   Musculoskeletal:         General: Normal range of motion.      Cervical back: Neck supple.   Skin:     General: Skin is warm and dry.      Comments: Midline sternotomy dressing CDI.+CT in place.  Pacer wires in place   Neurological:      Mental Status: He is alert and oriented to person, place, and time. Mental status is at baseline.   Psychiatric:         Mood and Affect: Mood normal.         Behavior: Behavior normal.       Current Inpatient Medications:    Current Facility-Administered Medications:     0.9%  NaCl infusion (for blood administration), , Intravenous, Q24H PRN, Vini Ricks MD    acetaminophen oral solution 650 mg, 650 mg, Per OG tube, Q6H PRN, MARISOL Roca    albumin human 5% bottle 12.5 g, 12.5 g, Intravenous, PRN, MARISOL Roca, Stopped at 12/06/22 0820    amLODIPine tablet 5 mg, 5 mg, Oral, Daily, Candido Martinez MD, 5 mg at 12/09/22 0858    aspirin EC tablet 81 mg, 81 mg, Oral, Daily, MARISOL Roca, 81 mg at 12/09/22 0858    atorvastatin tablet 80 mg, 80 mg, Oral, QHS, Candido Martinez MD, 80 mg at 12/08/22 2048    calcium gluconate 1 g in NS IVPB (premixed), 1 g, Intravenous, PRN, MARISOL Roca    calcium gluconate 1 g in NS IVPB (premixed), 2 g, Intravenous, PRN, MARISOL Roca    calcium gluconate 1 g in NS IVPB (premixed), 3 g, Intravenous, PRN, MARISOL Roca    cefazolin (ANCEF) 2 gram in dextrose 5% 50 mL IVPB (premix), 2 g, Intravenous, On Call Procedure, MARISOL Brown    cefUROXime sodium 1.5 g in dextrose 5 % in water (D5W) 5 % 100 mL IVPB (MB+), 1.5 g,  Intravenous, On Call Procedure, Vini Ricks MD    dextrose 10 % infusion, 12.5 g, Intravenous, PRN, MARISOL Roca    dextrose 10 % infusion, 25 g, Intravenous, PRN, MARISOL Roca    docusate sodium capsule 100 mg, 100 mg, Oral, BID, MARISOL Roca, 100 mg at 12/09/22 0858    enoxaparin injection 40 mg, 40 mg, Subcutaneous, Daily, MARISOL Roca, 40 mg at 12/08/22 1741    famotidine (PF) injection 20 mg, 20 mg, Intravenous, Daily, MARISOL Roca, 20 mg at 12/09/22 0858    folic acid tablet 1 mg, 1 mg, Oral, Daily, MARISOL Roca, 1 mg at 12/09/22 0858    HYDROcodone-acetaminophen 5-325 mg per tablet 1 tablet, 1 tablet, Oral, Q4H PRN, MARISOL Roca, 1 tablet at 12/09/22 0618    HYDROmorphone (PF) injection 0.2 mg, 0.2 mg, Intravenous, Q5 Min PRN, Boris Garland DO    lactulose 10 gram/15 ml solution 20 g, 20 g, Oral, Q6H PRN, MARISOL Roca    loperamide capsule 2 mg, 2 mg, Oral, Continuous PRN, MARISOL Roca    losartan tablet 25 mg, 25 mg, Oral, Daily, Candido Martinez MD, 25 mg at 12/04/22 0903    magnesium sulfate 2g in water 50mL IVPB (premix), 2 g, Intravenous, PRN, MARISOL Roca    magnesium sulfate 2g in water 50mL IVPB (premix), 4 g, Intravenous, PRN, MARISOL Roca    melatonin tablet 6 mg, 6 mg, Oral, Nightly PRN, Candido Martinez MD, 6 mg at 12/09/22 0041    metoclopramide HCl injection 5 mg, 5 mg, Intravenous, Q6H PRN, MARISOL Roca    metoprolol tartrate (LOPRESSOR) split tablet 12.5 mg, 12.5 mg, Oral, BID, MARISOL Roca, 12.5 mg at 12/09/22 0858    mupirocin 2 % ointment, , Nasal, On Call Procedure, Sissy M Block, PA    mupirocin 2 % ointment, , Topical (Top), Daily, Yair Benson MD, Given at 12/08/22 0900    mupirocin 2 % ointment, , Nasal, BID, MARISOL Roca, Given at 12/09/22 0858    nitroGLYCERIN SL tablet, , , PRN, Candido Martinez MD, 0.1 mg at 12/02/22 1754    oxyCODONE immediate release tablet 10 mg, 10 mg, Oral, Q4H PRN, MARISOL Roca, 10 mg at 12/06/22 0208    perflutren lipid  microspheres injection 1.3 mL, 1.3 mL, Intravenous, Once, Rhys Samaniego MD    potassium chloride 20 mEq in 100 mL IVPB (FOR CENTRAL LINE ADMINISTRATION ONLY), 20 mEq, Intravenous, PRN, MARISOL Roca, Last Rate: 50 mL/hr at 12/05/22 1847, 20 mEq at 12/05/22 1847    potassium chloride 20 mEq in 100 mL IVPB (FOR CENTRAL LINE ADMINISTRATION ONLY), 60 mEq, Intravenous, PRN, MARISOL Roca    potassium chloride 40 mEq in 100 mL IVPB (FOR CENTRAL LINE ADMINISTRATION ONLY), 40 mEq, Intravenous, PRN, MARISOL Roca    promethazine injection 25 mg, 25 mg, Intramuscular, Q4H PRN, Yair Benson MD, 25 mg at 12/06/22 1030    simethicone chewable tablet 80 mg, 1 tablet, Oral, TID PRN, Jayna Carrera MD, 80 mg at 12/07/22 2332    sodium chloride 0.9% flush 10 mL, 10 mL, Intravenous, PRN, Candido Martinez MD    sodium chloride 0.9% flush 10 mL, 10 mL, Intravenous, PRN, Boris Garland,     sodium phosphate 15 mmol in dextrose 5 % 250 mL IVPB, 15 mmol, Intravenous, PRN, MARISOL Roca    sodium phosphate 20.01 mmol in dextrose 5 % 250 mL IVPB, 20.01 mmol, Intravenous, PRN, MARISOL Roca    sodium phosphate 30 mmol in dextrose 5 % 250 mL IVPB, 30 mmol, Intravenous, PRN, MARISOL Roca    sucralfate tablet 1 g, 1 g, Oral, Q6H, Candido Martinez MD, 1 g at 12/08/22 1741        Assessment:     IMPRESSION:  NSTEMI type I  MV CAD/CABG x 3 (LIMA To LAD, SVG to Diag, SVG to RPDA)  Sinus Arrest leading to cardiac arrest    -patient was junctional,then has sinus arresst which lead to asystole  SSS  ICMO    -EF 35-40%  Angina  HTN  HLD  GERD  No Hx of GI Bleed      Plan:     PLAN:  Continue ASA, high intensity statin,   Right groin precautions/activity restrictions  Continue Lopressor 12.5mg BID  Continue Losartan 25mg daily      Nehemias Santos [facility]  Cardiology  [facility] - Observation Unit  12/09/2022

## 2022-12-09 NOTE — PROGRESS NOTES
12/09/22 0815   Pre Exercise Vitals   /70   Pulse 88   Supplemental O2? No   SpO2 98 %   During Exercise Vitals   Pulse 98   Supplemental O2? No   SpO2 92 %   Distance Walked 50 feet   Post Exercise Vitals   /83   Pulse 95   Supplemental O2? No   SpO2 99 %   Modality   Modality Walker   Min assist x1 from sitting to standing. Sternal precautions maintained. Gait steady with rollator. Tolerated well. Encouraged incentive spirometer q 1 hour. Performed 750 max.

## 2022-12-10 LAB
ABS NEUT (OLG): 6.09 X10(3)/MCL (ref 2.1–9.2)
ALBUMIN SERPL-MCNC: 2.7 GM/DL (ref 3.4–4.8)
ALBUMIN/GLOB SERPL: 1.4 RATIO (ref 1.1–2)
ALP SERPL-CCNC: 63 UNIT/L (ref 40–150)
ALT SERPL-CCNC: 18 UNIT/L (ref 0–55)
ANISOCYTOSIS BLD QL SMEAR: ABNORMAL
AST SERPL-CCNC: 17 UNIT/L (ref 5–34)
BASOPHILS NFR BLD MANUAL: 0.09 X10(3)/MCL (ref 0–0.2)
BASOPHILS NFR BLD MANUAL: 1 %
BILIRUBIN DIRECT+TOT PNL SERPL-MCNC: 1.4 MG/DL
BUN SERPL-MCNC: 13.6 MG/DL (ref 8.4–25.7)
CALCIUM SERPL-MCNC: 7.6 MG/DL (ref 8.8–10)
CHLORIDE SERPL-SCNC: 108 MMOL/L (ref 98–107)
CO2 SERPL-SCNC: 23 MMOL/L (ref 23–31)
CREAT SERPL-MCNC: 0.91 MG/DL (ref 0.73–1.18)
EOSINOPHIL NFR BLD MANUAL: 0.09 X10(3)/MCL (ref 0–0.9)
EOSINOPHIL NFR BLD MANUAL: 1 %
ERYTHROCYTE [DISTWIDTH] IN BLOOD BY AUTOMATED COUNT: 13.7 % (ref 11.5–17)
GFR SERPLBLD CREATININE-BSD FMLA CKD-EPI: >60 MLS/MIN/1.73/M2
GLOBULIN SER-MCNC: 1.9 GM/DL (ref 2.4–3.5)
GLUCOSE SERPL-MCNC: 109 MG/DL (ref 82–115)
HCT VFR BLD AUTO: 25.9 % (ref 42–52)
HGB BLD-MCNC: 8.8 GM/DL (ref 14–18)
IMM GRANULOCYTES # BLD AUTO: 0.03 X10(3)/MCL (ref 0–0.04)
IMM GRANULOCYTES NFR BLD AUTO: 0.3 %
INSTRUMENT WBC (OLG): 8.7 X10(3)/MCL
LYMPHOCYTES NFR BLD MANUAL: 0.35 X10(3)/MCL
LYMPHOCYTES NFR BLD MANUAL: 4 %
MAGNESIUM SERPL-MCNC: 2.03 MG/DL (ref 1.6–2.6)
MCH RBC QN AUTO: 32.2 PG (ref 27–31)
MCHC RBC AUTO-ENTMCNC: 34 MG/DL (ref 33–36)
MCV RBC AUTO: 94.9 FL (ref 80–94)
MONOCYTES NFR BLD MANUAL: 2.17 X10(3)/MCL (ref 0.1–1.3)
MONOCYTES NFR BLD MANUAL: 25 %
NEUTROPHILS NFR BLD MANUAL: 70 %
NRBC BLD AUTO-RTO: 0 %
PHOSPHATE SERPL-MCNC: 2.4 MG/DL (ref 2.3–4.7)
PLATELET # BLD AUTO: 142 X10(3)/MCL (ref 130–400)
PLATELET # BLD EST: NORMAL 10*3/UL
PMV BLD AUTO: 9.5 FL (ref 7.4–10.4)
POIKILOCYTOSIS BLD QL SMEAR: ABNORMAL
POTASSIUM SERPL-SCNC: 3.6 MMOL/L (ref 3.5–5.1)
PROT SERPL-MCNC: 4.6 GM/DL (ref 5.8–7.6)
RBC # BLD AUTO: 2.73 X10(6)/MCL (ref 4.7–6.1)
RBC MORPH BLD: ABNORMAL
SODIUM SERPL-SCNC: 138 MMOL/L (ref 136–145)
STOMATOCYTES (OLG): ABNORMAL
WBC # SPEC AUTO: 8.7 X10(3)/MCL (ref 4.5–11.5)

## 2022-12-10 PROCEDURE — 85007 BL SMEAR W/DIFF WBC COUNT: CPT | Performed by: STUDENT IN AN ORGANIZED HEALTH CARE EDUCATION/TRAINING PROGRAM

## 2022-12-10 PROCEDURE — 97110 THERAPEUTIC EXERCISES: CPT

## 2022-12-10 PROCEDURE — 25000003 PHARM REV CODE 250

## 2022-12-10 PROCEDURE — 99024 PR POST-OP FOLLOW-UP VISIT: ICD-10-PCS | Mod: ,,, | Performed by: PHYSICIAN ASSISTANT

## 2022-12-10 PROCEDURE — 84100 ASSAY OF PHOSPHORUS: CPT | Performed by: STUDENT IN AN ORGANIZED HEALTH CARE EDUCATION/TRAINING PROGRAM

## 2022-12-10 PROCEDURE — 27000221 HC OXYGEN, UP TO 24 HOURS

## 2022-12-10 PROCEDURE — 25000003 PHARM REV CODE 250: Performed by: NURSE PRACTITIONER

## 2022-12-10 PROCEDURE — 94760 N-INVAS EAR/PLS OXIMETRY 1: CPT

## 2022-12-10 PROCEDURE — 36415 COLL VENOUS BLD VENIPUNCTURE: CPT | Performed by: STUDENT IN AN ORGANIZED HEALTH CARE EDUCATION/TRAINING PROGRAM

## 2022-12-10 PROCEDURE — 25000003 PHARM REV CODE 250: Performed by: PHYSICIAN ASSISTANT

## 2022-12-10 PROCEDURE — 83735 ASSAY OF MAGNESIUM: CPT | Performed by: STUDENT IN AN ORGANIZED HEALTH CARE EDUCATION/TRAINING PROGRAM

## 2022-12-10 PROCEDURE — 99024 POSTOP FOLLOW-UP VISIT: CPT | Mod: ,,, | Performed by: PHYSICIAN ASSISTANT

## 2022-12-10 PROCEDURE — 25000003 PHARM REV CODE 250: Performed by: INTERNAL MEDICINE

## 2022-12-10 PROCEDURE — 80053 COMPREHEN METABOLIC PANEL: CPT | Performed by: STUDENT IN AN ORGANIZED HEALTH CARE EDUCATION/TRAINING PROGRAM

## 2022-12-10 PROCEDURE — 63600175 PHARM REV CODE 636 W HCPCS: Performed by: NURSE PRACTITIONER

## 2022-12-10 PROCEDURE — 21400001 HC TELEMETRY ROOM

## 2022-12-10 RX ORDER — AMIODARONE HYDROCHLORIDE 200 MG/1
200 TABLET ORAL 2 TIMES DAILY
Status: COMPLETED | OUTPATIENT
Start: 2022-12-13 | End: 2022-12-15

## 2022-12-10 RX ORDER — AMIODARONE HYDROCHLORIDE 200 MG/1
200 TABLET ORAL DAILY
Status: DISCONTINUED | OUTPATIENT
Start: 2022-12-16 | End: 2022-12-15 | Stop reason: HOSPADM

## 2022-12-10 RX ORDER — LANOLIN ALCOHOL/MO/W.PET/CERES
400 CREAM (GRAM) TOPICAL 2 TIMES DAILY
Status: DISCONTINUED | OUTPATIENT
Start: 2022-12-10 | End: 2022-12-15 | Stop reason: HOSPADM

## 2022-12-10 RX ORDER — POTASSIUM CHLORIDE 20 MEQ/1
20 TABLET, EXTENDED RELEASE ORAL 2 TIMES DAILY
Status: DISCONTINUED | OUTPATIENT
Start: 2022-12-10 | End: 2022-12-15 | Stop reason: HOSPADM

## 2022-12-10 RX ORDER — MAGNESIUM SULFATE 1 G/100ML
1 INJECTION INTRAVENOUS ONCE
Status: DISCONTINUED | OUTPATIENT
Start: 2022-12-10 | End: 2022-12-10

## 2022-12-10 RX ORDER — AMIODARONE HYDROCHLORIDE 200 MG/1
400 TABLET ORAL 2 TIMES DAILY
Status: DISPENSED | OUTPATIENT
Start: 2022-12-10 | End: 2022-12-13

## 2022-12-10 RX ADMIN — SIMETHICONE 80 MG: 80 TABLET, CHEWABLE ORAL at 02:12

## 2022-12-10 RX ADMIN — METOPROLOL TARTRATE 12.5 MG: 25 TABLET, FILM COATED ORAL at 08:12

## 2022-12-10 RX ADMIN — LOSARTAN POTASSIUM 25 MG: 25 TABLET, FILM COATED ORAL at 09:12

## 2022-12-10 RX ADMIN — METOPROLOL TARTRATE 12.5 MG: 25 TABLET, FILM COATED ORAL at 09:12

## 2022-12-10 RX ADMIN — FAMOTIDINE 20 MG: 10 INJECTION, SOLUTION INTRAVENOUS at 09:12

## 2022-12-10 RX ADMIN — AMIODARONE HYDROCHLORIDE 400 MG: 200 TABLET ORAL at 12:12

## 2022-12-10 RX ADMIN — SUCRALFATE 1 G: 1 TABLET ORAL at 05:12

## 2022-12-10 RX ADMIN — MAGNESIUM OXIDE TAB 400 MG (241.3 MG ELEMENTAL MG) 400 MG: 400 (241.3 MG) TAB at 12:12

## 2022-12-10 RX ADMIN — FOLIC ACID 1 MG: 1 TABLET ORAL at 09:12

## 2022-12-10 RX ADMIN — AMIODARONE HYDROCHLORIDE 1 MG/MIN: 1.8 INJECTION, SOLUTION INTRAVENOUS at 02:12

## 2022-12-10 RX ADMIN — SUCRALFATE 1 G: 1 TABLET ORAL at 12:12

## 2022-12-10 RX ADMIN — DOCUSATE SODIUM 100 MG: 100 CAPSULE, LIQUID FILLED ORAL at 09:12

## 2022-12-10 RX ADMIN — POTASSIUM CHLORIDE 20 MEQ: 1500 TABLET, EXTENDED RELEASE ORAL at 09:12

## 2022-12-10 RX ADMIN — AMIODARONE HYDROCHLORIDE 1 MG/MIN: 1.8 INJECTION, SOLUTION INTRAVENOUS at 06:12

## 2022-12-10 RX ADMIN — ASPIRIN 81 MG: 81 TABLET, COATED ORAL at 09:12

## 2022-12-10 RX ADMIN — AMIODARONE HYDROCHLORIDE 150 MG: 1.5 INJECTION, SOLUTION INTRAVENOUS at 02:12

## 2022-12-10 RX ADMIN — AMLODIPINE BESYLATE 5 MG: 5 TABLET ORAL at 09:12

## 2022-12-10 RX ADMIN — AMIODARONE HYDROCHLORIDE 200 MG: 200 TABLET ORAL at 08:12

## 2022-12-10 NOTE — PROGRESS NOTES
CT SURGERY PROGRESS NOTE  Suresh Aragon  77 y.o.  1945    Patients Procedure: Procedure(s) (LRB):  INSERTION, CARDIAC PACEMAKER (N/A)    Subjective  Interval History: POD 5    ROS    Medication List  Infusions   amiodarone in dextrose 5% 1 mg/min (12/10/22 0603)    amiodarone in dextrose 5%      loperamide       Scheduled   amLODIPine  5 mg Oral Daily    aspirin  81 mg Oral Daily    atorvastatin  80 mg Oral QHS    docusate sodium  100 mg Oral BID    enoxaparin  40 mg Subcutaneous Daily    famotidine (PF)  20 mg Intravenous Daily    folic acid  1 mg Oral Daily    losartan  25 mg Oral Daily    metoprolol tartrate  12.5 mg Oral BID    mupirocin   Topical (Top) Daily    mupirocin   Nasal BID    perflutren lipid microspheres  1.3 mL Intravenous Once    sucralfate  1 g Oral Q6H       Objective:  Recent Vitals:  Temp:  [97.6 °F (36.4 °C)-99.3 °F (37.4 °C)] 98.4 °F (36.9 °C)  Pulse:  [] 67  Resp:  [0-29] 20  SpO2:  [95 %-99 %] 98 %  BP: (106-135)/(63-83) 125/68    Physical Exam     I/O last 24 hrs:  Intake/Output - Last 3 Shifts         12/08 0700  12/09 0659 12/09 0700  12/10 0659 12/10 0700  12/11 0659    P.O.  1100     IV Piggyback 700      Total Intake(mL/kg) 700 (8.8) 1100 (13.8)     Urine (mL/kg/hr) 1485 (0.8) 1525 (0.8)     Chest Tube 340      Total Output 1825 1525     Net -1125 -425            Urine Occurrence  0 x             Labs  BMP:   Recent Labs   Lab 12/10/22  0131      K 3.6   CO2 23   BUN 13.6   CREATININE 0.91   CALCIUM 7.6*   MG 2.03     CBC:   Recent Labs   Lab 12/10/22  0131   WBC 8.7   RBC 2.73*   HGB 8.8*   HCT 25.9*      MCV 94.9*   MCH 32.2*   MCHC 34.0     CMP:   Recent Labs   Lab 12/10/22  0131   CALCIUM 7.6*   ALBUMIN 2.7*      K 3.6   CO2 23   BUN 13.6   CREATININE 0.91   ALKPHOS 63   ALT 18   AST 17   BILITOT 1.4         Imaging:   CXR: No results found in the last 24 hours.        ASSESSMENT/PLAN:    In afib  Replace K and Mg  Telem         Case and plan of care  discussed with MD Jay Fall PA-C

## 2022-12-10 NOTE — PROGRESS NOTES
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  Sinus arrest leading to cardiac arrest s/p PPM implant 12/8  Newly afib  SSS  ICMO  LLE swelling    Relevant Medical History: HTN, HLD, GERD, angina, prostate cancer    Nutrition-Related Medications: suppository PRN, Colace BID, Pepcid daily, folic acid daily, mag oxide BID, KCl BID, Phenergan PRN, Carafate    Nutrition-Related Labs: 12/10: RBC-2.73, H/H-8.8/25.9, Cl-108, donna-7.6      Diet Order: Diet heart healthy  Oral Supplement Order: Boost  Appetite/Oral Intake: good/% of meals  Factors Affecting Nutritional Intake: constipation  Food/Jew/Cultural Preferences: none reported  Food Allergies: none reported    Skin Integrity: incision  Wound(s):       Comments    12/10: pt reports good appetite, eating most of lunch and dinner, and drinking boost with oatmeal for breakfast. Appetite prior admission was good. Pt with constipation, having 1 BM since admission, last BM 12/8. UBW reported 173 lb with no weight loss. Latest weight of 79.9 kg (176 lb) on 12/10 and previous weight of 78 kg (172 lb) on 12/2 and 78.4 kg (172 lb) on 11/28. Will continue to monitor.    Anthropometrics    Height: 5' 10" (177.8 cm)    Last Weight: 79.9 kg (176 lb 2.4 oz) (12/10/22 0600) Weight Method: Standard Scale  BMI (Calculated): 25.3  BMI Classification: overweight (BMI 25-29.9)        Ideal Body Weight (IBW), Male: 166 lb     % Ideal Body Weight, Male (lb): 103.59 %                          Usual Weight Provided By: patient and EMR weight history    Wt Readings from Last 5 " Encounters:   12/10/22 79.9 kg (176 lb 2.4 oz)     Weight Change(s) Since Admission:  Admit Weight: 78 kg (172 lb) (12/01/22 2126)      Patient Education    Not applicable.    Monitoring & Evaluation     Dietitian will monitor food and beverage intake and weight change.  Nutrition Risk/Follow-Up: low (follow-up in 5-7 days)  Patients assigned 'low nutrition risk' status do not qualify for a full nutritional assessment but will be monitored and re-evaluated in a 5-7 day time period. Please consult if re-evaluation needed sooner.    Josefina Anderson, Registration Eligible, Provisional LDN

## 2022-12-10 NOTE — PROGRESS NOTES
[facility]  Cardiology  Progress Note    Patient Name: Suresh Aragon  MRN: [id number]  Admission Date: 12/1/2022  Hospital Length of Stay: 5 days  Code Status: Full Code   Attending Physician: Yair Benson MD   Primary Care Physician: No primary care provider on file.  Expected Discharge Date:   Principal Problem:Unstable angina    Subjective:     Brief HPI:   This is a 77-year-old male, who is known to Dr. Martinez, with a history of HTN, HLD, GERD, angina.  Patient presents to the ER on 12/01/2022 with complaints of chest pain.  Of note patient was just seen in clinic by Dr. Martinez where he was advised to present to ER if had chest pain secondary to unstable angina prior to his scheduled outpatient LHC.  Patient stated the pain started approximately 7 days prior.  He stated the pain feels like a pressure and radiates down to his left arm.  He said the pain is worse with exertion and improves with rest.  He also had associated shortness of breath with exertion.  Initial troponin was negative however repeat troponins are now mildly elevated.  Initial EKG revealed normal sinus rhythm with a left axis deviation and LVH.  [facility] has admitted the patient for unstable angina.     Hospital Course:   12.3.22: NAD noted. VSS. SR on tele. Denies CP/SOB/Palps.  Right groin benign.  12.4.22: NAD noted. VSS. SR on tele. Denies CP/SOB/Palps. Awaiting CABG.  12.5.22: NAD noted. VSS. SR on tele. NPO for CABG today.  12.6.22:  Upon rounds this a.m. patient was in junctional rhythm with heart rate in the 90s.  Patient then stated he felt anxious and had sinus arrest with asystole.  Code was activated.  Patient was placed back in bed once patient in bed he recovered spontaneously without CPR or epinephrine.  Patient was placed back on external pacing.  Rate was attempted to be turned down to see underlying rhythm however patient continued to require pacing even pacer is lowest rate.  Upon recovery patient began vomiting and  "complained of feeling flushed.  Vital signs stable, no vasopressors required.  12.7.22: NAD noted. VSS. Paced on tele. VSS. Denies SOB/Palps, +incisional CP.  12.8.22: NAD noted. VSS. Paced on tele. Denies SOB/Palps, +incisional CP. NPO for PPM today.  12.9.22: NAD noted. VSS. SR on tele. Denies SOB/Palps, +incisional CP. CT Dcd.  12.10.22: NAD. VSS. Deneis SOB and Palps. + CP/Incisional. "I am ok."     PMH: HTN, HLD, GERD, angina, Prostate cancer  PSH:   appendectomy, prostate surgery  Social History:  Denies EtOH, tobacco, and illicit drug use  Family History:  Father-CAD, MI, HTN     Previous Cardiac Diagnostics:   Echo limited 12.6.22  Limited echo done by the bedside with physician post cardiac arrest; patient had open heart surgery 12/05/2022; definity used to evaluate LV ejection fraction for pacemaker evaluation.    The visually estimated ejection fraction is 35-40%.    Moderately decreased systolic function    Echo 12.3.22  Technically difficult study with limited visualization.  Definity used.    The estimated ejection fraction is 50%.    Left ventricle is normal in size with low-normal systolic function.    Grade 1 left ventricular diastolic dysfunction.    Carotid US 12.3.22  The right internal carotid artery demonstrated no hemodynamically significant stenosis   Left internal carotid artery demonstrated less than 50% stenosis.    Bilateral vertebral arteries were patent with antegrade flow    [facility] 12.2.22  1. LEFT MAIN:  MILD CALCIFICATION BUT NO OBSTRUCTIVE DISEASE.  2. LAD:  THE LAD SHOWS HEAVY CALCIFICATION PROXIMALLY.  THERE IS EVIDENCE OF A 95% STENOSIS PROXIMALLY INVOLVING THE 1ST DIAGONAL.  THE 1ST DIAGONAL SHOWS 95% STENOSIS PROXIMALLY.  THE MID LAD SHOWS 90% STENOSIS.  3. LEFT CIRCUMFLEX:  90% PROXIMAL STENOSIS IS NOTED  4. RIGHT CORONARY ARTERY:  THE RIGHT CORONARY ARTERY SHOWS A LONG, VERY DISTAL 90% LESION WHICH COMPROMISES THE FLOW INTO THE PDA     GRAFT ANGIOGRAPHY:  1. LIMA TO THE LEFT " SIDE OF THE CHEST WALL: WIDELY PATENT WITH NO DISEASE  2.  MECHE TO THE RIGHT SIDE OF THE CHEST WALL: WIDELY PATENT WITH NO DISEASE      Review of Systems   Constitutional: Positive for malaise/fatigue.   Cardiovascular:  Positive for chest pain. Negative for palpitations.        Incisional   Respiratory:  Negative for shortness of breath.    Psychiatric/Behavioral:  Negative for altered mental status.      Objective:     Vital Signs (Most Recent):  Temp: 99.1 °F (37.3 °C) (12/10/22 0800)  Pulse: 73 (12/10/22 0800)  Resp: 20 (12/10/22 0800)  BP: 133/70 (12/10/22 0911)  SpO2: (!) 92 % (12/10/22 0800) Vital Signs (24h Range):  Temp:  [97.6 °F (36.4 °C)-99.1 °F (37.3 °C)] 99.1 °F (37.3 °C)  Pulse:  [] 73  Resp:  [13-29] 20  SpO2:  [92 %-98 %] 92 %  BP: (106-134)/(63-79) 133/70     Weight: 79.9 kg (176 lb 2.4 oz)  Body mass index is 25.27 kg/m².    SpO2: (!) 92 %  (RETIRED) O2 Device (Oxygen Therapy): room air      Intake/Output Summary (Last 24 hours) at 12/10/2022 1006  Last data filed at 12/10/2022 0800  Gross per 24 hour   Intake 1100 ml   Output 1525 ml   Net -425 ml       Lines/Drains/Airways       Peripheral Intravenous Line  Duration                  Peripheral IV - Single Lumen 12/09/22 0700 20 G Anterior;Right Forearm 1 day                  Significant Labs: CMP   Recent Labs   Lab 12/09/22  1024 12/10/22  0131    138   K 4.0 3.6   CO2 23 23   BUN 18.7 13.6   CREATININE 1.01 0.91   CALCIUM 8.0* 7.6*   ALBUMIN 2.9* 2.7*   BILITOT 1.7* 1.4   ALKPHOS 62 63   AST 16 17   ALT 18 18     , CBC   Recent Labs   Lab 12/08/22  1743 12/09/22  1024 12/10/22  0131   WBC  --  11.1 8.7   HGB 11.0* 10.0* 8.8*   HCT 32.7* 29.7* 25.9*   PLT  --  158 142     , and Troponin   No results for input(s): TROPONINI in the last 48 hours.    Telemetry: SR    Physical Exam  Constitutional:       Appearance: Normal appearance.   HENT:      Head: Normocephalic.      Mouth/Throat:      Mouth: Mucous membranes are moist.   Eyes:       Extraocular Movements: Extraocular movements intact.      Conjunctiva/sclera: Conjunctivae normal.   Cardiovascular:      Rate and Rhythm: Normal rate and regular rhythm.      Pulses: Normal pulses.      Heart sounds: Normal heart sounds.      Comments: R Groin Soft/Flat, Non-Tender, No Sign of Bleed/Infection. +2 BLE Palpable Pedal Pulses  Pulmonary:      Effort: Pulmonary effort is normal.      Breath sounds: Normal breath sounds.   Abdominal:      Palpations: Abdomen is soft.   Musculoskeletal:         General: Normal range of motion.      Cervical back: Neck supple.   Skin:     General: Skin is warm and dry.      Comments: Midline Sternotomy Dressing C/D/I; L CW Pacer Site Dressing C/D/I; LLE Mild Swelling with Ecchymosis Noted to Thigh/Groin Area; Intact Distal Pedal Pulse with Cap Refill < 2 Seconds   Neurological:      Mental Status: He is alert and oriented to person, place, and time. Mental status is at baseline.   Psychiatric:         Mood and Affect: Mood normal.         Behavior: Behavior normal.     Current Inpatient Medications:    Current Facility-Administered Medications:     0.9%  NaCl infusion (for blood administration), , Intravenous, Q24H PRN, Vini Ricks MD    acetaminophen oral solution 650 mg, 650 mg, Per OG tube, Q6H PRN, MARISOL Roca    albumin human 5% bottle 12.5 g, 12.5 g, Intravenous, PRN, MARISOL Roca, Stopped at 12/06/22 0820    amiodarone 360 mg/200 mL (1.8 mg/mL) infusion, 0.5 mg/min, Intravenous, Continuous, Norma Hernandez NP, Last Rate: 16.7 mL/hr at 12/10/22 0815, 0.5 mg/min at 12/10/22 0815    amLODIPine tablet 5 mg, 5 mg, Oral, Daily, Candido Martinez MD, 5 mg at 12/10/22 0912    aspirin EC tablet 81 mg, 81 mg, Oral, Daily, MARISOL Roca, 81 mg at 12/10/22 0911    atorvastatin tablet 80 mg, 80 mg, Oral, QHS, Candido Martinez MD, 80 mg at 12/09/22 2030    bisacodyL suppository 10 mg, 10 mg, Rectal, Daily PRN, NATASHA Ball, 10 mg at 12/09/22 6957     calcium gluconate 1 g in NS IVPB (premixed), 1 g, Intravenous, PRN, MARISOL Roca    calcium gluconate 1 g in NS IVPB (premixed), 2 g, Intravenous, PRN, MARISOL Roca    calcium gluconate 1 g in NS IVPB (premixed), 3 g, Intravenous, PRN, MARISOL Roca    cefazolin (ANCEF) 2 gram in dextrose 5% 50 mL IVPB (premix), 2 g, Intravenous, On Call Procedure, MARISOL Brown    cefUROXime sodium 1.5 g in dextrose 5 % in water (D5W) 5 % 100 mL IVPB (MB+), 1.5 g, Intravenous, On Call Procedure, Vini Ricks MD    dextrose 10 % infusion, 12.5 g, Intravenous, PRN, MARISOL Roca    dextrose 10 % infusion, 25 g, Intravenous, PRN, MARISOL Roca    docusate sodium capsule 100 mg, 100 mg, Oral, BID, MARISOL Roca, 100 mg at 12/10/22 0912    enoxaparin injection 40 mg, 40 mg, Subcutaneous, Daily, MARISOL Roca, 40 mg at 12/09/22 1725    famotidine (PF) injection 20 mg, 20 mg, Intravenous, Daily, MARISOL Roca, 20 mg at 12/10/22 0911    folic acid tablet 1 mg, 1 mg, Oral, Daily, MARISOL Roca, 1 mg at 12/10/22 0912    HYDROcodone-acetaminophen 5-325 mg per tablet 1 tablet, 1 tablet, Oral, Q4H PRN, MARISOL Roca, 1 tablet at 12/09/22 1623    HYDROmorphone (PF) injection 0.2 mg, 0.2 mg, Intravenous, Q5 Min PRN, Boris Garland DO    lactulose 10 gram/15 ml solution 20 g, 20 g, Oral, Q6H PRN, MARISOL Roca, 20 g at 12/09/22 1614    loperamide capsule 2 mg, 2 mg, Oral, Continuous PRN, MARISOL Roca    losartan tablet 25 mg, 25 mg, Oral, Daily, Candido Martinez MD, 25 mg at 12/10/22 0912    magnesium sulfate in dextrose IVPB (premix) 1 g, 1 g, Intravenous, Once, Jay Fall PA-C    melatonin tablet 6 mg, 6 mg, Oral, Nightly PRN, Candido Martinez MD, 6 mg at 12/09/22 0041    metoclopramide HCl injection 5 mg, 5 mg, Intravenous, Q6H PRN, MARISOL Roca    metoprolol tartrate (LOPRESSOR) split tablet 12.5 mg, 12.5 mg, Oral, BID, MARISOL Roca, 12.5 mg at 12/10/22 0911    mupirocin 2 % ointment, , Nasal, On Call  Procedure, MARISOL Brown    mupirocin 2 % ointment, , Topical (Top), Daily, Yair Benson MD, Given at 12/09/22 0900    mupirocin 2 % ointment, , Nasal, BID, MARISOL Roca, Given at 12/09/22 2030    nitroGLYCERIN SL tablet, , , PRN, Candido Martinez MD, 0.1 mg at 12/02/22 1754    oxyCODONE immediate release tablet 10 mg, 10 mg, Oral, Q4H PRN, MARISOL Roca, 10 mg at 12/06/22 0208    perflutren lipid microspheres injection 1.3 mL, 1.3 mL, Intravenous, Once, Rhys Samaniego MD    potassium chloride SA CR tablet 20 mEq, 20 mEq, Oral, BID, MARISOL Orlando-C, 20 mEq at 12/10/22 0912    promethazine injection 25 mg, 25 mg, Intramuscular, Q4H PRN, Yair Benson MD, 25 mg at 12/06/22 1030    simethicone chewable tablet 80 mg, 1 tablet, Oral, TID PRN, Jayna Carrera MD, 80 mg at 12/10/22 0219    sodium chloride 0.9% flush 10 mL, 10 mL, Intravenous, PRN, Candido Martinez MD    sodium chloride 0.9% flush 10 mL, 10 mL, Intravenous, PRN, Boris Garland DO    sodium phosphate 15 mmol in dextrose 5 % 250 mL IVPB, 15 mmol, Intravenous, PRN, MARISOL Roca    sodium phosphate 20.01 mmol in dextrose 5 % 250 mL IVPB, 20.01 mmol, Intravenous, PRN, MARISOL Roca    sodium phosphate 30 mmol in dextrose 5 % 250 mL IVPB, 30 mmol, Intravenous, PRN, MARISOL Roca    sucralfate tablet 1 g, 1 g, Oral, Q6H, Candido Martinez MD, 1 g at 12/10/22 0557    Assessment:   NSTEMI Type I  MVCAD/CABG x 3 (LIMA To LAD, SVG to Diag, SVG to RPDA)  Sinus Arrest leading to Cardiac Arrest s/p PPM Implant per [facility] (11.8.22)    - PT was in a Junctional Rhythm which Lead to Sinus Arrest and then Asystole  Newly Afib/Post Operative (Not Captured on EKG)    - CHADsVASc - 4 Points - 4.8% Stroke Risk per Year   SSS/PPM (Placed on 11.8.22)   ICMO/EF 35-40%  HTN  HLD  GERD  LLE Swelling   No Hx of GI Bleed    Plan:   Continue ASA, Statin, ARB and BB  Continue IV Amiodarone at 0.5mg/min and Start Oral Amiodarone Load: Amiodarone 400mg PO BID x 3 Days then  200mg PO BID x 3 Days then 200mg PO Daily there after   Will Fully Anticoagulate PT when OK with [facility] Re Stroke Risk Reduction  PT/OT Eval and Treat  LLE Venous US to R/O DVT  Labs and EKG in AM: CBC, CMP and Mg    Lucas Murillo, ANP  Cardiology  [facility]  12/10/2022

## 2022-12-10 NOTE — PROGRESS NOTES
12/10/22 0815   Pre Exercise Vitals   /74   Pulse 74   Supplemental O2? No   SpO2 97 %   During Exercise Vitals   Pulse 86   Supplemental O2? No   SpO2 95 %   Distance Walked 75 feet   Post Exercise Vitals   /72   Pulse 79   Supplemental O2? No   SpO2 96 %   Modality   Modality Walker   Min assist x1. Sternal precautions maintained. Gait steady with walker. I brief standing rest break needed. Encouraged incentive spirometer. Performed max 750. Communicated with nurse pre and post walk.

## 2022-12-11 LAB
ALBUMIN SERPL-MCNC: 2.7 GM/DL (ref 3.4–4.8)
ALBUMIN/GLOB SERPL: 1 RATIO (ref 1.1–2)
ALP SERPL-CCNC: 64 UNIT/L (ref 40–150)
ALT SERPL-CCNC: 20 UNIT/L (ref 0–55)
AST SERPL-CCNC: 23 UNIT/L (ref 5–34)
BASOPHILS # BLD AUTO: 0.05 X10(3)/MCL (ref 0–0.2)
BASOPHILS NFR BLD AUTO: 0.6 %
BILIRUBIN DIRECT+TOT PNL SERPL-MCNC: 1.6 MG/DL
BUN SERPL-MCNC: 14.1 MG/DL (ref 8.4–25.7)
CALCIUM SERPL-MCNC: 8.2 MG/DL (ref 8.8–10)
CHLORIDE SERPL-SCNC: 106 MMOL/L (ref 98–107)
CO2 SERPL-SCNC: 23 MMOL/L (ref 23–31)
CREAT SERPL-MCNC: 0.96 MG/DL (ref 0.73–1.18)
EOSINOPHIL # BLD AUTO: 0.26 X10(3)/MCL (ref 0–0.9)
EOSINOPHIL NFR BLD AUTO: 3.1 %
ERYTHROCYTE [DISTWIDTH] IN BLOOD BY AUTOMATED COUNT: 13.6 % (ref 11.5–17)
GFR SERPLBLD CREATININE-BSD FMLA CKD-EPI: >60 MLS/MIN/1.73/M2
GLOBULIN SER-MCNC: 2.6 GM/DL (ref 2.4–3.5)
GLUCOSE SERPL-MCNC: 178 MG/DL (ref 82–115)
HCT VFR BLD AUTO: 26.6 % (ref 42–52)
HGB BLD-MCNC: 9.1 GM/DL (ref 14–18)
IMM GRANULOCYTES # BLD AUTO: 0.04 X10(3)/MCL (ref 0–0.04)
IMM GRANULOCYTES NFR BLD AUTO: 0.5 %
LYMPHOCYTES # BLD AUTO: 1.59 X10(3)/MCL (ref 0.6–4.6)
LYMPHOCYTES NFR BLD AUTO: 19.1 %
MAGNESIUM SERPL-MCNC: 1.9 MG/DL (ref 1.6–2.6)
MCH RBC QN AUTO: 32.7 PG (ref 27–31)
MCHC RBC AUTO-ENTMCNC: 34.2 MG/DL (ref 33–36)
MCV RBC AUTO: 95.7 FL (ref 80–94)
MONOCYTES # BLD AUTO: 1.15 X10(3)/MCL (ref 0.1–1.3)
MONOCYTES NFR BLD AUTO: 13.8 %
NEUTROPHILS # BLD AUTO: 5.2 X10(3)/MCL (ref 2.1–9.2)
NEUTROPHILS NFR BLD AUTO: 62.9 %
NRBC BLD AUTO-RTO: 0 %
PHOSPHATE SERPL-MCNC: 1.9 MG/DL (ref 2.3–4.7)
PLATELET # BLD AUTO: 205 X10(3)/MCL (ref 130–400)
PMV BLD AUTO: 9.5 FL (ref 7.4–10.4)
POTASSIUM SERPL-SCNC: 3.8 MMOL/L (ref 3.5–5.1)
PROT SERPL-MCNC: 5.3 GM/DL (ref 5.8–7.6)
RBC # BLD AUTO: 2.78 X10(6)/MCL (ref 4.7–6.1)
SODIUM SERPL-SCNC: 136 MMOL/L (ref 136–145)
WBC # SPEC AUTO: 8.3 X10(3)/MCL (ref 4.5–11.5)

## 2022-12-11 PROCEDURE — 85025 COMPLETE CBC W/AUTO DIFF WBC: CPT | Performed by: STUDENT IN AN ORGANIZED HEALTH CARE EDUCATION/TRAINING PROGRAM

## 2022-12-11 PROCEDURE — 25000003 PHARM REV CODE 250: Performed by: NURSE PRACTITIONER

## 2022-12-11 PROCEDURE — 25000003 PHARM REV CODE 250: Performed by: PHYSICIAN ASSISTANT

## 2022-12-11 PROCEDURE — 84100 ASSAY OF PHOSPHORUS: CPT | Performed by: STUDENT IN AN ORGANIZED HEALTH CARE EDUCATION/TRAINING PROGRAM

## 2022-12-11 PROCEDURE — 21400001 HC TELEMETRY ROOM

## 2022-12-11 PROCEDURE — 63600175 PHARM REV CODE 636 W HCPCS

## 2022-12-11 PROCEDURE — 25000003 PHARM REV CODE 250: Performed by: INTERNAL MEDICINE

## 2022-12-11 PROCEDURE — 36415 COLL VENOUS BLD VENIPUNCTURE: CPT | Performed by: STUDENT IN AN ORGANIZED HEALTH CARE EDUCATION/TRAINING PROGRAM

## 2022-12-11 PROCEDURE — 80053 COMPREHEN METABOLIC PANEL: CPT | Performed by: STUDENT IN AN ORGANIZED HEALTH CARE EDUCATION/TRAINING PROGRAM

## 2022-12-11 PROCEDURE — 97530 THERAPEUTIC ACTIVITIES: CPT | Mod: CO

## 2022-12-11 PROCEDURE — 99024 PR POST-OP FOLLOW-UP VISIT: ICD-10-PCS | Mod: ,,, | Performed by: PHYSICIAN ASSISTANT

## 2022-12-11 PROCEDURE — 97162 PT EVAL MOD COMPLEX 30 MIN: CPT

## 2022-12-11 PROCEDURE — 83735 ASSAY OF MAGNESIUM: CPT | Performed by: STUDENT IN AN ORGANIZED HEALTH CARE EDUCATION/TRAINING PROGRAM

## 2022-12-11 PROCEDURE — 25000003 PHARM REV CODE 250

## 2022-12-11 PROCEDURE — 99024 POSTOP FOLLOW-UP VISIT: CPT | Mod: ,,, | Performed by: PHYSICIAN ASSISTANT

## 2022-12-11 PROCEDURE — 92610 EVALUATE SWALLOWING FUNCTION: CPT

## 2022-12-11 RX ADMIN — METOPROLOL TARTRATE 12.5 MG: 25 TABLET, FILM COATED ORAL at 08:12

## 2022-12-11 RX ADMIN — POTASSIUM CHLORIDE 20 MEQ: 1500 TABLET, EXTENDED RELEASE ORAL at 10:12

## 2022-12-11 RX ADMIN — POTASSIUM CHLORIDE 20 MEQ: 1500 TABLET, EXTENDED RELEASE ORAL at 08:12

## 2022-12-11 RX ADMIN — MUPIROCIN: 20 OINTMENT TOPICAL at 09:12

## 2022-12-11 RX ADMIN — ATORVASTATIN CALCIUM 80 MG: 40 TABLET, FILM COATED ORAL at 08:12

## 2022-12-11 RX ADMIN — ENOXAPARIN SODIUM 40 MG: 40 INJECTION SUBCUTANEOUS at 04:12

## 2022-12-11 RX ADMIN — MAGNESIUM OXIDE TAB 400 MG (241.3 MG ELEMENTAL MG) 400 MG: 400 (241.3 MG) TAB at 10:12

## 2022-12-11 RX ADMIN — FOLIC ACID 1 MG: 1 TABLET ORAL at 09:12

## 2022-12-11 RX ADMIN — MAGNESIUM OXIDE TAB 400 MG (241.3 MG ELEMENTAL MG) 400 MG: 400 (241.3 MG) TAB at 08:12

## 2022-12-11 RX ADMIN — AMIODARONE HYDROCHLORIDE 400 MG: 200 TABLET ORAL at 10:12

## 2022-12-11 RX ADMIN — AMIODARONE HYDROCHLORIDE 400 MG: 200 TABLET ORAL at 08:12

## 2022-12-11 RX ADMIN — DOCUSATE SODIUM 100 MG: 100 CAPSULE, LIQUID FILLED ORAL at 08:12

## 2022-12-11 RX ADMIN — ASPIRIN 81 MG: 81 TABLET, COATED ORAL at 10:12

## 2022-12-11 RX ADMIN — LOSARTAN POTASSIUM 25 MG: 25 TABLET, FILM COATED ORAL at 10:12

## 2022-12-11 RX ADMIN — AMLODIPINE BESYLATE 5 MG: 5 TABLET ORAL at 10:12

## 2022-12-11 RX ADMIN — FAMOTIDINE 20 MG: 10 INJECTION, SOLUTION INTRAVENOUS at 10:12

## 2022-12-11 RX ADMIN — DOCUSATE SODIUM 100 MG: 100 CAPSULE, LIQUID FILLED ORAL at 10:12

## 2022-12-11 RX ADMIN — METOPROLOL TARTRATE 12.5 MG: 25 TABLET, FILM COATED ORAL at 10:12

## 2022-12-11 NOTE — PLAN OF CARE
Problem: Physical Therapy  Goal: Physical Therapy Goal  Description: Goals to be met by: 23     Patient will increase functional independence with mobility by performin. Supine to sit with Set-up [place]  2. Sit to supine with Set-up [place]  3. Sit to stand transfer with Supervision  4. Bed to chair transfer with Supervision using Rolling Walker  5. Gait  x 350 feet with Supervision using Rolling Walker.     Outcome: Ongoing, Progressing

## 2022-12-11 NOTE — PT/OT/SLP PROGRESS
"Occupational Therapy  Treatment    Suresh Aragon   MRN: [id number]   Admitting Diagnosis: Unstable angina        OT Start Time: 0956  OT Stop Time: 1019  OT Total Time (min): 23 min    Bi    OT/CINDI: CINDI PUENTE Visit Number: 2    General Precautions: Standard, fall  Orthopedic Precautions:    Braces: N/A  Respiratory Status: Room air     PT BP in sitting 132/71 and in stance 126/71. Pt did not demo hypotension this date.     Subjective:  Communicated with RN prior to session.  Pain/Comfort  Pain Rating 1: 0/10    S: pt reporting "I walked with the other people too." (Cardiac rehab)    O: pt with good ability to follow sternal prxns and complete STS c BLE and min A. Pt performed func mob drill in segura c CGA  and no AD and one standing rest break req CGA/min A for pt rocking back on heels req min A to recover. Pt with good ability to self pace and use breathing strategies to slow HR and breathing pattern. Pt able to control decent back to seat c CGA. And left with all needs in reach.    A: pt with good ax tolerance and ability to recall sternal prxns and good static standing balance.    P:cont c current OT POC        AM-PAC 6 CLICK ADL   How much help from another person does this patient currently need?   1 = Unable, Total/Dependent Assistance  2 = A lot, Maximum/Moderate Assistance  3 = A little, Minimum/Contact Guard/Supervision  4 = None, Modified [place]/Independent    Putting on and taking off regular lower body clothing? : 2  Bathing (including washing, rinsing, drying)?: 2  Toileting, which includes using toilet, bedpan, or urinal? : 3  Putting on and taking off regular upper body clothing?: 3  Taking care of personal grooming such as brushing teeth?: 4  Eating meals?: 4  Daily Activity Total Score: 18     AM-PAC Raw Score CMS "G-Code Modifier Level of Impairment Assistance   6 % Total / Unable   7 - 8 CM 80 - 100% Maximal Assist   9-13 CL 60 - 80% Moderate Assist   14 - 19 CK 40 - 60% Moderate " Assist   20 - 22 CJ 20 - 40% Minimal Assist   23 CI 1-20% SBA / CGA   24 CH 0% Independent/ Mod I       Patient left up in chair with all lines intact and call button in reach    ASSESSMENT:  Suresh Aragon is a 77 y.o. male with a medical diagnosis of Unstable angina and presents with .    Rehab identified problem list/impairments:  weakness, impaired endurance, impaired self care skills, impaired functional mobility, other (comment) (sternal precautions)    Rehab potential is good.    Activity tolerance: Excellent    Discharge recommendations: home health OT   Barriers to discharge:      Equipment recommendations:      GOALS:   Multidisciplinary Problems       Occupational Therapy Goals          Problem: Occupational Therapy    Goal Priority Disciplines Outcome Interventions   Occupational Therapy Goal     OT, PT/OT Ongoing, Progressing    Description: Pt will complete UE dressing mod I  Pt will complete LE dressing mod I   Pt will complete functional transfers mod I  Pt will demonstrate competency sternal precautions for all ADL tasks and mobility mod I.  Pt will complete toileting mod I                         Plan:  Patient to be seen daily to address the above listed problems via self-care/home management, therapeutic exercises, therapeutic activities  Plan of Care expires:    Plan of Care reviewed with: patient         12/11/2022

## 2022-12-11 NOTE — PROGRESS NOTES
CT SURGERY PROGRESS NOTE  Suresh Aragon  77 y.o.  1945    Patients Procedure: Procedure(s) (LRB):  INSERTION, CARDIAC PACEMAKER (N/A)    Subjective  Interval History: POD 6    ROS    Medication List  Infusions   loperamide       Scheduled   [START ON 12/16/2022] amiodarone  200 mg Oral Daily    [START ON 12/13/2022] amiodarone  200 mg Oral BID    amiodarone  400 mg Oral BID    amLODIPine  5 mg Oral Daily    aspirin  81 mg Oral Daily    atorvastatin  80 mg Oral QHS    docusate sodium  100 mg Oral BID    enoxaparin  40 mg Subcutaneous Daily    famotidine (PF)  20 mg Intravenous Daily    folic acid  1 mg Oral Daily    losartan  25 mg Oral Daily    magnesium oxide  400 mg Oral BID    metoprolol tartrate  12.5 mg Oral BID    mupirocin   Topical (Top) Daily    perflutren lipid microspheres  1.3 mL Intravenous Once    potassium chloride  20 mEq Oral BID    sucralfate  1 g Oral Q6H       Objective:  Recent Vitals:  Temp:  [98.3 °F (36.8 °C)-99.1 °F (37.3 °C)] 98.6 °F (37 °C)  Pulse:  [70-86] 84  Resp:  [14-27] 18  SpO2:  [94 %-98 %] 94 %  BP: (117-148)/(66-73) 125/66    Physical Exam     I/O last 24 hrs:  Intake/Output - Last 3 Shifts         12/09 0700  12/10 0659 12/10 0700  12/11 0659 12/11 0700  12/12 0659    P.O. 1100 240     IV Piggyback       Total Intake(mL/kg) 1100 (13.8) 240 (3)     Urine (mL/kg/hr) 1525 (0.8) 1475 (0.8)     Stool  0     Chest Tube       Total Output 1525 1475     Net -425 -1235            Urine Occurrence 0 x      Stool Occurrence  0 x             Labs  BMP:   Recent Labs   Lab 12/11/22  0623      K 3.8   CO2 23   BUN 14.1   CREATININE 0.96   CALCIUM 8.2*   MG 1.90     CBC:   Recent Labs   Lab 12/11/22  0623   WBC 8.3   RBC 2.78*   HGB 9.1*   HCT 26.6*      MCV 95.7*   MCH 32.7*   MCHC 34.2     CMP:   Recent Labs   Lab 12/11/22  0623   CALCIUM 8.2*   ALBUMIN 2.7*      K 3.8   CO2 23   BUN 14.1   CREATININE 0.96   ALKPHOS 64   ALT 20   AST 23   BILITOT 1.6*         Imaging:    CXR: No results found in the last 24 hours.        ASSESSMENT/PLAN:    ? Issue yesterday with swallowing pills- joyce do bedside eval and resume diet as indicated    DC planning 24-48    Case and plan of care discussed with MD Jay Fall PA-C

## 2022-12-11 NOTE — PROGRESS NOTES
[facility]  Cardiology  Progress Note    Patient Name: Suresh Aragon  MRN: [id number]  Admission Date: 12/1/2022  Hospital Length of Stay: 6 days  Code Status: Full Code   Attending Physician: Yair Benson MD   Primary Care Physician: No primary care provider on file.  Expected Discharge Date:   Principal Problem:Unstable angina    Subjective:     Brief HPI:   This is a 77-year-old male, who is known to Dr. Martinez, with a history of HTN, HLD, GERD, angina.  Patient presents to the ER on 12/01/2022 with complaints of chest pain.  Of note patient was just seen in clinic by Dr. Martinez where he was advised to present to ER if had chest pain secondary to unstable angina prior to his scheduled outpatient LHC.  Patient stated the pain started approximately 7 days prior.  He stated the pain feels like a pressure and radiates down to his left arm.  He said the pain is worse with exertion and improves with rest.  He also had associated shortness of breath with exertion.  Initial troponin was negative however repeat troponins are now mildly elevated.  Initial EKG revealed normal sinus rhythm with a left axis deviation and LVH.  [facility] has admitted the patient for unstable angina.     Hospital Course:   12.3.22: NAD noted. VSS. SR on tele. Denies CP/SOB/Palps.  Right groin benign.  12.4.22: NAD noted. VSS. SR on tele. Denies CP/SOB/Palps. Awaiting CABG.  12.5.22: NAD noted. VSS. SR on tele. NPO for CABG today.  12.6.22:  Upon rounds this a.m. patient was in junctional rhythm with heart rate in the 90s.  Patient then stated he felt anxious and had sinus arrest with asystole.  Code was activated.  Patient was placed back in bed once patient in bed he recovered spontaneously without CPR or epinephrine.  Patient was placed back on external pacing.  Rate was attempted to be turned down to see underlying rhythm however patient continued to require pacing even pacer is lowest rate.  Upon recovery patient began vomiting and  "complained of feeling flushed.  Vital signs stable, no vasopressors required.  12.7.22: NAD noted. VSS. Paced on tele. VSS. Denies SOB/Palps, +incisional CP.  12.8.22: NAD noted. VSS. Paced on tele. Denies SOB/Palps, +incisional CP. NPO for PPM today.  12.9.22: NAD noted. VSS. SR on tele. Denies SOB/Palps, +incisional CP. CT Dcd.  12.10.22: NAD. VSS. Deneis SOB and Palps. + CP/Incisional. "I am ok."   12.11.22: NAD. VSS. Denies SOB and Palps. + CP/Incisional. "I feeling pretty good."     PMH: HTN, HLD, GERD, angina, Prostate cancer  PSH:   appendectomy, prostate surgery  Social History:  Denies EtOH, tobacco, and illicit drug use  Family History:  Father-CAD, MI, HTN     Previous Cardiac Diagnostics:   Echo limited 12.6.22  Limited echo done by the bedside with physician post cardiac arrest; patient had open heart surgery 12/05/2022; definity used to evaluate LV ejection fraction for pacemaker evaluation.    The visually estimated ejection fraction is 35-40%.    Moderately decreased systolic function    Echo 12.3.22  Technically difficult study with limited visualization.  Definity used.    The estimated ejection fraction is 50%.    Left ventricle is normal in size with low-normal systolic function.    Grade 1 left ventricular diastolic dysfunction.    Carotid US 12.3.22  The right internal carotid artery demonstrated no hemodynamically significant stenosis   Left internal carotid artery demonstrated less than 50% stenosis.    Bilateral vertebral arteries were patent with antegrade flow    [facility] 12.2.22  1. LEFT MAIN:  MILD CALCIFICATION BUT NO OBSTRUCTIVE DISEASE.  2. LAD:  THE LAD SHOWS HEAVY CALCIFICATION PROXIMALLY.  THERE IS EVIDENCE OF A 95% STENOSIS PROXIMALLY INVOLVING THE 1ST DIAGONAL.  THE 1ST DIAGONAL SHOWS 95% STENOSIS PROXIMALLY.  THE MID LAD SHOWS 90% STENOSIS.  3. LEFT CIRCUMFLEX:  90% PROXIMAL STENOSIS IS NOTED  4. RIGHT CORONARY ARTERY:  THE RIGHT CORONARY ARTERY SHOWS A LONG, VERY DISTAL 90% LESION " WHICH COMPROMISES THE FLOW INTO THE PDA     GRAFT ANGIOGRAPHY:  1. LIMA TO THE LEFT SIDE OF THE CHEST WALL: WIDELY PATENT WITH NO DISEASE  2.  MECHE TO THE RIGHT SIDE OF THE CHEST WALL: WIDELY PATENT WITH NO DISEASE      Review of Systems   Constitutional: Positive for malaise/fatigue.   Cardiovascular:  Positive for chest pain. Negative for palpitations.        Incisional   Respiratory:  Negative for shortness of breath.    Psychiatric/Behavioral:  Negative for altered mental status.      Objective:     Vital Signs (Most Recent):  Temp: 98.5 °F (36.9 °C) (12/11/22 0833)  Pulse: 80 (12/11/22 0833)  Resp: 18 (12/11/22 0432)  BP: 126/68 (12/11/22 0833)  SpO2: 97 % (12/11/22 0833) Vital Signs (24h Range):  Temp:  [98.3 °F (36.8 °C)-99.1 °F (37.3 °C)] 98.5 °F (36.9 °C)  Pulse:  [70-86] 80  Resp:  [18-22] 18  SpO2:  [94 %-97 %] 97 %  BP: (117-148)/(66-73) 126/68     Weight: 78.9 kg (173 lb 15.1 oz)  Body mass index is 24.96 kg/m².    SpO2: 97 %  (RETIRED) O2 Device (Oxygen Therapy): room air      Intake/Output Summary (Last 24 hours) at 12/11/2022 1110  Last data filed at 12/11/2022 0546  Gross per 24 hour   Intake 240 ml   Output 1475 ml   Net -1235 ml       Lines/Drains/Airways       Peripheral Intravenous Line  Duration                  Peripheral IV - Single Lumen 12/10/22 1115 20 G Left;Anterior Upper Arm <1 day                  Significant Labs: CMP   Recent Labs   Lab 12/10/22  0131 12/11/22  0623    136   K 3.6 3.8   CO2 23 23   BUN 13.6 14.1   CREATININE 0.91 0.96   CALCIUM 7.6* 8.2*   ALBUMIN 2.7* 2.7*   BILITOT 1.4 1.6*   ALKPHOS 63 64   AST 17 23   ALT 18 20     , CBC   Recent Labs   Lab 12/10/22  0131 12/11/22  0623   WBC 8.7 8.3   HGB 8.8* 9.1*   HCT 25.9* 26.6*    205     , and Troponin   No results for input(s): TROPONINI in the last 48 hours.    Telemetry: SR    Physical Exam  Constitutional:       Appearance: Normal appearance.   HENT:      Head: Normocephalic.      Mouth/Throat:      Mouth:  Mucous membranes are moist.   Eyes:      Extraocular Movements: Extraocular movements intact.      Conjunctiva/sclera: Conjunctivae normal.   Cardiovascular:      Rate and Rhythm: Normal rate and regular rhythm.      Pulses: Normal pulses.      Heart sounds: Normal heart sounds.      Comments: R Groin Soft/Flat, Non-Tender, No Sign of Bleed/Infection. +2 BLE Palpable Pedal Pulses  Pulmonary:      Effort: Pulmonary effort is normal.      Breath sounds: Normal breath sounds.   Abdominal:      Palpations: Abdomen is soft.   Musculoskeletal:         General: Normal range of motion.      Cervical back: Neck supple.   Skin:     General: Skin is warm and dry.      Comments: Midline Sternotomy Dressing C/D/I; L CW Pacer Site Dressing C/D/I; LLE Mild Swelling with Ecchymosis Noted to Thigh/Groin Area; Intact Distal Pedal Pulse with Cap Refill < 2 Seconds   Neurological:      Mental Status: He is alert and oriented to person, place, and time. Mental status is at baseline.   Psychiatric:         Mood and Affect: Mood normal.         Behavior: Behavior normal.     Current Inpatient Medications:    Current Facility-Administered Medications:     0.9%  NaCl infusion (for blood administration), , Intravenous, Q24H PRN, Vini Ricks MD    acetaminophen oral solution 650 mg, 650 mg, Per OG tube, Q6H PRN, MARISOL Roca    albumin human 5% bottle 12.5 g, 12.5 g, Intravenous, PRN, MARISOL Roca, Stopped at 12/06/22 0820    [START ON 12/16/2022] amiodarone tablet 200 mg, 200 mg, Oral, Daily, NATASHA Ball    [START ON 12/13/2022] amiodarone tablet 200 mg, 200 mg, Oral, BID, NATASHA Ball, 200 mg at 12/10/22 2052    amiodarone tablet 400 mg, 400 mg, Oral, BID, NATASHA Ball, 400 mg at 12/11/22 1029    amLODIPine tablet 5 mg, 5 mg, Oral, Daily, Candido Martinez MD, 5 mg at 12/11/22 1029    aspirin EC tablet 81 mg, 81 mg, Oral, Daily, MARISOL Roca, 81 mg at 12/11/22 1029    atorvastatin tablet 80 mg, 80 mg, Oral, QHS,  Candido Martinez MD, 80 mg at 12/09/22 2030    bisacodyL suppository 10 mg, 10 mg, Rectal, Daily PRN, NATASHA Ball, 10 mg at 12/09/22 1725    calcium gluconate 1 g in NS IVPB (premixed), 1 g, Intravenous, PRN, MARISOL Roca    calcium gluconate 1 g in NS IVPB (premixed), 2 g, Intravenous, PRN, MARISOL Roca    calcium gluconate 1 g in NS IVPB (premixed), 3 g, Intravenous, PRN, MARISOL Roca    cefazolin (ANCEF) 2 gram in dextrose 5% 50 mL IVPB (premix), 2 g, Intravenous, On Call Procedure, MARISOL Brown    cefUROXime sodium 1.5 g in dextrose 5 % in water (D5W) 5 % 100 mL IVPB (MB+), 1.5 g, Intravenous, On Call Procedure, Vini Ricks MD    dextrose 10 % infusion, 12.5 g, Intravenous, PRN, MARISOL Roca    dextrose 10 % infusion, 25 g, Intravenous, PRN, MARISOL Roca    docusate sodium capsule 100 mg, 100 mg, Oral, BID, MARISOL Roca, 100 mg at 12/11/22 1029    enoxaparin injection 40 mg, 40 mg, Subcutaneous, Daily, MARISOL Roca, 40 mg at 12/09/22 1725    famotidine (PF) injection 20 mg, 20 mg, Intravenous, Daily, MARISOL Roca, 20 mg at 12/11/22 1029    folic acid tablet 1 mg, 1 mg, Oral, Daily, MARISOL Roca, 1 mg at 12/11/22 0900    HYDROcodone-acetaminophen 5-325 mg per tablet 1 tablet, 1 tablet, Oral, Q4H PRN, MARISOL Roca, 1 tablet at 12/09/22 1623    HYDROmorphone (PF) injection 0.2 mg, 0.2 mg, Intravenous, Q5 Min PRN, Boris Garland DO    lactulose 10 gram/15 ml solution 20 g, 20 g, Oral, Q6H PRN, MARISOL Roca, 20 g at 12/09/22 1614    loperamide capsule 2 mg, 2 mg, Oral, Continuous PRN, MARISOL Roca    losartan tablet 25 mg, 25 mg, Oral, Daily, Candido Martinez MD, 25 mg at 12/11/22 1030    magnesium oxide tablet 400 mg, 400 mg, Oral, BID, NATASHA Ball, 400 mg at 12/11/22 1030    melatonin tablet 6 mg, 6 mg, Oral, Nightly PRN, Candido Martinez MD, 6 mg at 12/09/22 0041    metoclopramide HCl injection 5 mg, 5 mg, Intravenous, Q6H PRN, MARISOL Roca    metoprolol  tartrate (LOPRESSOR) split tablet 12.5 mg, 12.5 mg, Oral, BID, MARISOL Roca, 12.5 mg at 12/11/22 1029    mupirocin 2 % ointment, , Nasal, On Call Procedure, MARISOL Brown    mupirocin 2 % ointment, , Topical (Top), Daily, Yair Benson MD, Given at 12/11/22 0900    nitroGLYCERIN SL tablet, , , PRN, Candido Martinez MD, 0.1 mg at 12/02/22 1754    oxyCODONE immediate release tablet 10 mg, 10 mg, Oral, Q4H PRN, MARISOL Roca, 10 mg at 12/06/22 0208    perflutren lipid microspheres injection 1.3 mL, 1.3 mL, Intravenous, Once, Rhys Samaniego MD    potassium chloride SA CR tablet 20 mEq, 20 mEq, Oral, BID, Jay Fall PA-C, 20 mEq at 12/11/22 1030    promethazine injection 25 mg, 25 mg, Intramuscular, Q4H PRN, Yair Benson MD, 25 mg at 12/06/22 1030    simethicone chewable tablet 80 mg, 1 tablet, Oral, TID PRN, Jayna Carrera MD, 80 mg at 12/10/22 0219    sodium chloride 0.9% flush 10 mL, 10 mL, Intravenous, PRN, Candido Martinez MD    sodium chloride 0.9% flush 10 mL, 10 mL, Intravenous, PRN, Boris Garland DO    sodium phosphate 15 mmol in dextrose 5 % 250 mL IVPB, 15 mmol, Intravenous, PRN, MARISOL Roca    sodium phosphate 20.01 mmol in dextrose 5 % 250 mL IVPB, 20.01 mmol, Intravenous, PRN, MARISOL Roca    sodium phosphate 30 mmol in dextrose 5 % 250 mL IVPB, 30 mmol, Intravenous, PRN, MARISOL Roca    sucralfate tablet 1 g, 1 g, Oral, Q6H, Candido Martinez MD, 1 g at 12/10/22 1210    Assessment:   NSTEMI Type I  MVCAD/CABG x 3 (LIMA To LAD, SVG to Diag, SVG to RPDA)  Sinus Arrest leading to Cardiac Arrest s/p PPM Implant per [facility] (11.8.22)    - PT was in a Junctional Rhythm which Lead to Sinus Arrest and then Asystole  Newly Afib/Post Operative (Not Captured on EKG)    - CHADsVASc - 4 Points - 4.8% Stroke Risk per Year   SSS/PPM (Placed on 11.8.22)   ICMO/EF 35-40%  HTN  HLD  GERD  LLE Swelling - Preliminary Venous US Unremarkable  No Hx of GI Bleed    Plan:   Continue ASA, Statin, ARB, Amiodarone  Load and BB   Will Fully Anticoagulate PT when OK with [facility] Re Stroke Risk Reduction  Continue PT/OT Eval and Treat  LLE Venous US to R/O DVT - Preliminary Negative   Labs in AM: CBC, CMP and Mg    Lucas Murillo, NATASHA  Cardiology  [facility]  12/11/2022

## 2022-12-11 NOTE — PT/OT/SLP EVAL
Physical Therapy Evaluation    Patient Name:  Suresh Aragon   MRN:  [id number]    Recommendations:     Discharge Recommendations: home with home health   Discharge Equipment Recommendations: walker, rolling   Barriers to discharge:  complexity of medical condition    Assessment:     Suresh Aragon is a 77 y.o. male admitted with a medical diagnosis of Unstable angina, s/c CABG x3, pacemaker.  He presents with the following impairments/functional limitations: impaired endurance, impaired functional mobility, gait instability, impaired balance, decreased lower extremity function.    Rehab Prognosis: Good; patient would benefit from acute skilled PT services to address these deficits and reach maximum level of function.    Recent Surgery: Procedure(s) (LRB):  INSERTION, CARDIAC PACEMAKER (N/A) 3 Days Post-Op    Plan:     During this hospitalization, patient to be seen daily to address the identified rehab impairments via gait training, therapeutic activities, therapeutic exercises, neuromuscular re-education and progress toward the following goals:    Plan of Care Expires:  01/06/23    Subjective     Chief Complaint: reported feeling good, only slight pain  Patient/Family Comments/goals: go back home  Pain/Comfort:  Pain Rating 1: 3/10  Location 1: chest  Pain Addressed 1: Pre-medicate for activity    Patients cultural, spiritual, Orthodox conflicts given the current situation: no    Living Environment:  Lives at home with wife  Prior to admission, patients level of function was fully independent in ADL and no AD for mobility.  Equipment used at home: none.   .  Upon discharge, patient will have assistance from family and with [facility].    Objective:     Communicated with nursing prior to session.  Patient found up in chair with peripheral IV, telemetry, pulse ox (continuous)  upon PT entry to room.    General Precautions: Standard, fall, sternal  Orthopedic Precautions:    Braces:    Respiratory Status: Room  air    Exams:  RLE ROM: WNL  RLE Strength: WNL  LLE ROM: WNL  LLE Strength: WFL    Functional Mobility:  Transfers:     Sit to Stand:  minimum assistance with rolling walker  Bed to Chair: minimum assistance with  rolling walker  using  Step Transfer  Gait: ambulates using RW with CGA for safety x200ft      AM-PAC 6 CLICK MOBILITY  Total Score:14       Patient left up in chair with call button in reach.    GOALS:   Multidisciplinary Problems       Physical Therapy Goals          Problem: Physical Therapy    Goal Priority Disciplines Outcome Goal Variances Interventions   Physical Therapy Goal     PT, PT/OT Ongoing, Progressing     Description: Goals to be met by: 23     Patient will increase functional independence with mobility by performin. Supine to sit with Set-up [place]  2. Sit to supine with Set-up [place]  3. Sit to stand transfer with Supervision  4. Bed to chair transfer with Supervision using Rolling Walker  5. Gait  x 350 feet with Supervision using Rolling Walker.                          History:     No past medical history on file.    Past Surgical History:   Procedure Laterality Date    ANGIOGRAM, CORONARY, WITH LEFT HEART CATHETERIZATION N/A 2022    Procedure: Angiogram, Coronary, with Left Heart Cath;  Surgeon: Candido Martinez MD;  Location: [facility] CATH LAB;  Service: Cardiology;  Laterality: N/A;    CORONARY ARTERY BYPASS GRAFT (CABG) N/A 2022    Procedure: CORONARY ARTERY BYPASS GRAFT (CABG);  Surgeon: Vini Ricks MD;  Location: [facility];  Service: Cardiothoracic;  Laterality: N/A;  Case #3    INSERTION OF PACEMAKER N/A 2022    Procedure: INSERTION, CARDIAC PACEMAKER;  Surgeon: Vini Ricks MD;  Location: [facility];  Service: Cardiology;  Laterality: N/A;  INSERTION OF PERM PACEMAKER //    REP - PAT       Time Tracking:     PT Received On:    PT Start Time: 828     PT Stop Time: 848  PT Total Time (min): 20 min     Billable Minutes: Evaluation  Moderate complexity      12/11/2022

## 2022-12-11 NOTE — PLAN OF CARE
Problem: Adult Inpatient Plan of Care  Goal: Plan of Care Review  Outcome: Ongoing, Progressing  Goal: Patient-Specific Goal (Individualized)  Outcome: Ongoing, Progressing  Goal: Absence of Hospital-Acquired Illness or Injury  Outcome: Ongoing, Progressing  Goal: Optimal Comfort and Wellbeing  Outcome: Ongoing, Progressing  Goal: Readiness for Transition of Care  Outcome: Ongoing, Progressing     Problem: Fall Injury Risk  Goal: Absence of Fall and Fall-Related Injury  Outcome: Ongoing, Progressing     Problem: Infection  Goal: Absence of Infection Signs and Symptoms  Outcome: Ongoing, Progressing     Problem: Communication Impairment (Mechanical Ventilation, Invasive)  Goal: Effective Communication  Outcome: Ongoing, Progressing     Problem: Device-Related Complication Risk (Mechanical Ventilation, Invasive)  Goal: Optimal Device Function  Outcome: Ongoing, Progressing     Problem: Inability to Wean (Mechanical Ventilation, Invasive)  Goal: Mechanical Ventilation Liberation  Outcome: Ongoing, Progressing     Problem: Nutrition Impairment (Mechanical Ventilation, Invasive)  Goal: Optimal Nutrition Delivery  Outcome: Ongoing, Progressing     Problem: Skin and Tissue Injury (Mechanical Ventilation, Invasive)  Goal: Absence of Device-Related Skin and Tissue Injury  Outcome: Ongoing, Progressing     Problem: Ventilator-Induced Lung Injury (Mechanical Ventilation, Invasive)  Goal: Absence of Ventilator-Induced Lung Injury  Outcome: Ongoing, Progressing     Problem: Skin Injury Risk Increased  Goal: Skin Health and Integrity  Outcome: Ongoing, Progressing

## 2022-12-11 NOTE — PT/OT/SLP EVAL
"Speech Language Pathology Department  Clinical Swallow Evaluation    Patient Name:  Suresh Aragon   MRN:  [id number]  Admitting Diagnosis: Unstable angina    Recommendations:     General recommendations:  SLP follow up x1  Diet recommendations:  Regular Diet - IDDSI Level 7, Liquid Diet Level: Thin liquids - IDDSI Level 0   Swallow strategies/precautions: small bites/sips and slow rate  Precautions: Standard,      History:     No past medical history on file.    Past Surgical History:   Procedure Laterality Date    ANGIOGRAM, CORONARY, WITH LEFT HEART CATHETERIZATION N/A 12/2/2022    Procedure: Angiogram, Coronary, with Left Heart Cath;  Surgeon: Candido Martinez MD;  Location: [facility] CATH LAB;  Service: Cardiology;  Laterality: N/A;    CORONARY ARTERY BYPASS GRAFT (CABG) N/A 12/5/2022    Procedure: CORONARY ARTERY BYPASS GRAFT (CABG);  Surgeon: Vini Ricks MD;  Location: [facility] OR;  Service: Cardiothoracic;  Laterality: N/A;  Case #3    INSERTION OF PACEMAKER N/A 12/8/2022    Procedure: INSERTION, CARDIAC PACEMAKER;  Surgeon: Vini Ricks MD;  Location: [facility];  Service: Cardiology;  Laterality: N/A;  INSERTION OF PERM PACEMAKER //    REP - PAT     Pt reports coughing with meds     Home Diet: Regular and thin liquids  Current Method of Nutrition: NPO    Patient complaint: denies    Subjective     Patient awake, alert, and oriented x4 .    Patient goals: "to get better"    Spiritual/Cultural/Faith Beliefs/Practices that affect care: no    Pain/Comfort:  0/10      Objective:     Oral Musculature Evaluation  Oral Musculature: WFL  Dentition: present and adequate  Oral Labial Strength and Mobility: WFL    Consistency Fed By Oral Symptoms Pharyngeal Symptoms   Thin liquid by cup SLP None None   Thin liquid by straw SLP None None   Puree SLP None None   Chewable solid SLP None None     Assessment:     No signs/sx of aspiration observed. Rec: initiate diet. SLP to follow up x1 regarding diet tolerance. "     Goals:   Multidisciplinary Problems       SLP Goals       Not on file                  Patient Education:     Patient provided with verbal education regarding POC.  Understanding was verbalized.    Plan:     Patient to be seen:      Plan of Care expires:     Plan of Care reviewed with:      SLP Follow-Up:         Time Tracking:     SLP Treatment Date:    12/11/22  Speech Start Time:   0945  Speech Stop Time:    1000    Speech Total Time (min):   15    Billable minutes:  Swallow and Oral Function Evaluation, 15      12/11/2022

## 2022-12-12 LAB
ALBUMIN SERPL-MCNC: 2.7 GM/DL (ref 3.4–4.8)
ALBUMIN/GLOB SERPL: 1 RATIO (ref 1.1–2)
ALP SERPL-CCNC: 72 UNIT/L (ref 40–150)
ALT SERPL-CCNC: 25 UNIT/L (ref 0–55)
AST SERPL-CCNC: 27 UNIT/L (ref 5–34)
BASOPHILS # BLD AUTO: 0.06 X10(3)/MCL (ref 0–0.2)
BASOPHILS NFR BLD AUTO: 0.6 %
BILIRUBIN DIRECT+TOT PNL SERPL-MCNC: 1.9 MG/DL
BUN SERPL-MCNC: 12.8 MG/DL (ref 8.4–25.7)
CALCIUM SERPL-MCNC: 8.2 MG/DL (ref 8.8–10)
CHLORIDE SERPL-SCNC: 107 MMOL/L (ref 98–107)
CO2 SERPL-SCNC: 25 MMOL/L (ref 23–31)
CREAT SERPL-MCNC: 0.92 MG/DL (ref 0.73–1.18)
EOSINOPHIL # BLD AUTO: 0.37 X10(3)/MCL (ref 0–0.9)
EOSINOPHIL NFR BLD AUTO: 3.8 %
ERYTHROCYTE [DISTWIDTH] IN BLOOD BY AUTOMATED COUNT: 13.5 % (ref 11.5–17)
GFR SERPLBLD CREATININE-BSD FMLA CKD-EPI: >60 MLS/MIN/1.73/M2
GLOBULIN SER-MCNC: 2.6 GM/DL (ref 2.4–3.5)
GLUCOSE SERPL-MCNC: 109 MG/DL (ref 82–115)
HCT VFR BLD AUTO: 27.6 % (ref 42–52)
HGB BLD-MCNC: 9 GM/DL (ref 14–18)
IMM GRANULOCYTES # BLD AUTO: 0.03 X10(3)/MCL (ref 0–0.04)
IMM GRANULOCYTES NFR BLD AUTO: 0.3 %
LYMPHOCYTES # BLD AUTO: 1.88 X10(3)/MCL (ref 0.6–4.6)
LYMPHOCYTES NFR BLD AUTO: 19.5 %
MAGNESIUM SERPL-MCNC: 1.9 MG/DL (ref 1.6–2.6)
MCH RBC QN AUTO: 31.9 PG (ref 27–31)
MCHC RBC AUTO-ENTMCNC: 32.6 MG/DL (ref 33–36)
MCV RBC AUTO: 97.9 FL (ref 80–94)
MONOCYTES # BLD AUTO: 1.31 X10(3)/MCL (ref 0.1–1.3)
MONOCYTES NFR BLD AUTO: 13.6 %
NEUTROPHILS # BLD AUTO: 6 X10(3)/MCL (ref 2.1–9.2)
NEUTROPHILS NFR BLD AUTO: 62.2 %
NRBC BLD AUTO-RTO: 0 %
PLATELET # BLD AUTO: 235 X10(3)/MCL (ref 130–400)
PMV BLD AUTO: 9.3 FL (ref 7.4–10.4)
POTASSIUM SERPL-SCNC: 4.1 MMOL/L (ref 3.5–5.1)
PROT SERPL-MCNC: 5.3 GM/DL (ref 5.8–7.6)
RBC # BLD AUTO: 2.82 X10(6)/MCL (ref 4.7–6.1)
SODIUM SERPL-SCNC: 139 MMOL/L (ref 136–145)
WBC # SPEC AUTO: 9.6 X10(3)/MCL (ref 4.5–11.5)

## 2022-12-12 PROCEDURE — 83735 ASSAY OF MAGNESIUM: CPT | Performed by: NURSE PRACTITIONER

## 2022-12-12 PROCEDURE — 85025 COMPLETE CBC W/AUTO DIFF WBC: CPT | Performed by: NURSE PRACTITIONER

## 2022-12-12 PROCEDURE — 25000003 PHARM REV CODE 250: Performed by: PHYSICIAN ASSISTANT

## 2022-12-12 PROCEDURE — 97116 GAIT TRAINING THERAPY: CPT | Mod: CQ

## 2022-12-12 PROCEDURE — 25000003 PHARM REV CODE 250: Performed by: NURSE PRACTITIONER

## 2022-12-12 PROCEDURE — 63600175 PHARM REV CODE 636 W HCPCS: Performed by: NURSE PRACTITIONER

## 2022-12-12 PROCEDURE — 25000003 PHARM REV CODE 250: Performed by: INTERNAL MEDICINE

## 2022-12-12 PROCEDURE — 97535 SELF CARE MNGMENT TRAINING: CPT

## 2022-12-12 PROCEDURE — 94760 N-INVAS EAR/PLS OXIMETRY 1: CPT

## 2022-12-12 PROCEDURE — 36415 COLL VENOUS BLD VENIPUNCTURE: CPT | Performed by: NURSE PRACTITIONER

## 2022-12-12 PROCEDURE — 97110 THERAPEUTIC EXERCISES: CPT

## 2022-12-12 PROCEDURE — 21400001 HC TELEMETRY ROOM

## 2022-12-12 PROCEDURE — 80053 COMPREHEN METABOLIC PANEL: CPT | Performed by: NURSE PRACTITIONER

## 2022-12-12 PROCEDURE — 25000003 PHARM REV CODE 250

## 2022-12-12 RX ORDER — METOPROLOL SUCCINATE 25 MG/1
25 TABLET, EXTENDED RELEASE ORAL DAILY
Status: DISCONTINUED | OUTPATIENT
Start: 2022-12-13 | End: 2022-12-15 | Stop reason: HOSPADM

## 2022-12-12 RX ORDER — ACETAMINOPHEN 325 MG/1
650 TABLET ORAL EVERY 6 HOURS PRN
Status: DISCONTINUED | OUTPATIENT
Start: 2022-12-12 | End: 2022-12-15 | Stop reason: HOSPADM

## 2022-12-12 RX ORDER — FUROSEMIDE 10 MG/ML
20 INJECTION INTRAMUSCULAR; INTRAVENOUS ONCE
Status: COMPLETED | OUTPATIENT
Start: 2022-12-12 | End: 2022-12-12

## 2022-12-12 RX ORDER — POLYETHYLENE GLYCOL 3350 17 G/17G
17 POWDER, FOR SOLUTION ORAL DAILY
Status: DISCONTINUED | OUTPATIENT
Start: 2022-12-12 | End: 2022-12-15 | Stop reason: HOSPADM

## 2022-12-12 RX ADMIN — APIXABAN 5 MG: 5 TABLET, FILM COATED ORAL at 01:12

## 2022-12-12 RX ADMIN — METOPROLOL TARTRATE 12.5 MG: 25 TABLET, FILM COATED ORAL at 08:12

## 2022-12-12 RX ADMIN — MAGNESIUM OXIDE TAB 400 MG (241.3 MG ELEMENTAL MG) 400 MG: 400 (241.3 MG) TAB at 09:12

## 2022-12-12 RX ADMIN — LOSARTAN POTASSIUM 25 MG: 25 TABLET, FILM COATED ORAL at 08:12

## 2022-12-12 RX ADMIN — POTASSIUM CHLORIDE 20 MEQ: 1500 TABLET, EXTENDED RELEASE ORAL at 08:12

## 2022-12-12 RX ADMIN — MAGNESIUM OXIDE TAB 400 MG (241.3 MG ELEMENTAL MG) 400 MG: 400 (241.3 MG) TAB at 08:12

## 2022-12-12 RX ADMIN — POLYETHYLENE GLYCOL 3350 17 G: 17 POWDER, FOR SOLUTION ORAL at 09:12

## 2022-12-12 RX ADMIN — FUROSEMIDE 20 MG: 10 INJECTION, SOLUTION INTRAVENOUS at 01:12

## 2022-12-12 RX ADMIN — APIXABAN 5 MG: 5 TABLET, FILM COATED ORAL at 09:12

## 2022-12-12 RX ADMIN — FOLIC ACID 1 MG: 1 TABLET ORAL at 08:12

## 2022-12-12 RX ADMIN — POTASSIUM CHLORIDE 20 MEQ: 1500 TABLET, EXTENDED RELEASE ORAL at 09:12

## 2022-12-12 RX ADMIN — AMIODARONE HYDROCHLORIDE 400 MG: 200 TABLET ORAL at 08:12

## 2022-12-12 RX ADMIN — ASPIRIN 81 MG: 81 TABLET, COATED ORAL at 08:12

## 2022-12-12 RX ADMIN — AMIODARONE HYDROCHLORIDE 400 MG: 200 TABLET ORAL at 09:12

## 2022-12-12 RX ADMIN — AMLODIPINE BESYLATE 5 MG: 5 TABLET ORAL at 08:12

## 2022-12-12 RX ADMIN — ACETAMINOPHEN 650 MG: 325 TABLET, FILM COATED ORAL at 09:12

## 2022-12-12 RX ADMIN — DOCUSATE SODIUM 100 MG: 100 CAPSULE, LIQUID FILLED ORAL at 08:12

## 2022-12-12 RX ADMIN — DOCUSATE SODIUM 100 MG: 100 CAPSULE, LIQUID FILLED ORAL at 09:12

## 2022-12-12 RX ADMIN — ATORVASTATIN CALCIUM 80 MG: 40 TABLET, FILM COATED ORAL at 09:12

## 2022-12-12 NOTE — PT/OT/SLP PROGRESS
Occupational Therapy   Treatment    Name: Suresh Aragon  MRN: [id number]  Admitting Diagnosis: Unstable angina CAD s/p CABG x 3 12/5, s/p pacemaker 12/8  Recent Surgery: Procedure(s) (LRB):  INSERTION, CARDIAC PACEMAKER (N/A) 4 Days Post-Op    Recommendations:     Discharge Recommendations: rehabilitation facility vs home with home health (TBD, pending progress)  Discharge Equipment Recommendations: RW  Barriers to discharge: medical dx, placement (referral sent to rehab facility per CM note)    Assessment:     Suresh Aragon is a 77 y.o. male with a medical diagnosis of Unstable angina.  He presents with c/o chest pain. Performance deficits affecting function are weakness, impaired endurance, impaired self care skills, impaired functional mobility, other (comment) (sternal precautions).     Rehab Prognosis:  Good; patient would benefit from acute skilled OT services to address these deficits and reach maximum level of function.       Plan:     Patient to be seen daily to address the above listed problems via self-care/home management, therapeutic activities, therapeutic exercises  Plan of Care Expires: 12/23/22  Plan of Care Reviewed with: patient    Subjective     Pain/Comfort:  Pain Rating 1: 4/10  Location 1: chest    Objective:     Communicated with: RN prior to session.  Patient found up in chair with telemetry upon OT entry to room.    General Precautions: Standard, fall, sternal pxns 2/2 CABG x3, pacemaker pxns  Braces: N/A  Respiratory Status: Room air     Occupational Performance:      Functional Mobility/Transfers:  Patient completed Sit <> Stand Transfer with contact guard assistance  with  BUEs positioned across chest.   Functional Mobility: Pt ambulated <> bathroom with CGA provided and no AD. Pt then performed long distance gait trial in hallway using RW with CGA progressing to SBA and standing rest breaks provided.    Activities of Daily Living:  Grooming: Pt performed oral hygiene task while standing  at sink with SBA provided.    Treatment & Education:  OT provided re-education on sternal and pacemaker pxns in order to increase pt's safety during ADL participation. Pt verbalized and demonstrated good understanding with vcs provided during session.    Patient left up in chair with all lines intact, call button in reach, and RN notified.    GOALS:   Multidisciplinary Problems       Occupational Therapy Goals          Problem: Occupational Therapy    Goal Priority Disciplines Outcome Interventions   Occupational Therapy Goal     OT, PT/OT Ongoing, Progressing    Description: Pt will complete UE dressing mod I  Pt will complete LE dressing mod I   Pt will complete functional transfers mod I  Pt will demonstrate competency sternal precautions for all ADL tasks and mobility mod I.  Pt will complete toileting mod I                         Time Tracking:     OT Date of Treatment: 12/12/22  OT Start Time: 1239  OT Stop Time: 1310  OT Total Time (min): 31 min    Billable Minutes:Self Care/Home Management 31 mins    OT/CINDI: OT     CINDI Visit Number: 2    12/12/2022

## 2022-12-12 NOTE — PROGRESS NOTES
[facility]  Cardiology  Progress Note    Patient Name: Suresh Aragon  MRN: [id number]  Admission Date: 12/1/2022  Hospital Length of Stay: 7 days  Code Status: Full Code   Attending Physician: Yair Benson MD   Primary Care Physician: No primary care provider on file.  Expected Discharge Date:   Principal Problem:Unstable angina    Subjective:     Brief HPI:   This is a 77-year-old male, who is known to Dr. Martinez, with a history of HTN, HLD, GERD, angina.  Patient presents to the ER on 12/01/2022 with complaints of chest pain.  Of note patient was just seen in clinic by Dr. Martinez where he was advised to present to ER if had chest pain secondary to unstable angina prior to his scheduled outpatient LHC.  Patient stated the pain started approximately 7 days prior.  He stated the pain feels like a pressure and radiates down to his left arm.  He said the pain is worse with exertion and improves with rest.  He also had associated shortness of breath with exertion.  Initial troponin was negative however repeat troponins are now mildly elevated.  Initial EKG revealed normal sinus rhythm with a left axis deviation and LVH.  [facility] has admitted the patient for unstable angina.     Hospital Course:   12.3.22: NAD noted. VSS. SR on tele. Denies CP/SOB/Palps.  Right groin benign.  12.4.22: NAD noted. VSS. SR on tele. Denies CP/SOB/Palps. Awaiting CABG.  12.5.22: NAD noted. VSS. SR on tele. NPO for CABG today.  12.6.22:  Upon rounds this a.m. patient was in junctional rhythm with heart rate in the 90s.  Patient then stated he felt anxious and had sinus arrest with asystole.  Code was activated.  Patient was placed back in bed once patient in bed he recovered spontaneously without CPR or epinephrine.  Patient was placed back on external pacing.  Rate was attempted to be turned down to see underlying rhythm however patient continued to require pacing even pacer is lowest rate.  Upon recovery patient began vomiting and  "complained of feeling flushed.  Vital signs stable, no vasopressors required.  12.7.22: NAD noted. VSS. Paced on tele. VSS. Denies SOB/Palps, +incisional CP.  12.8.22: NAD noted. VSS. Paced on tele. Denies SOB/Palps, +incisional CP. NPO for PPM today.  12.9.22: NAD noted. VSS. SR on tele. Denies SOB/Palps, +incisional CP. CT Dcd.  12.10.22: NAD. VSS. Deneis SOB and Palps. + CP/Incisional. "I am ok."   12.11.22: NAD. VSS. Denies SOB and Palps. + CP/Incisional. "I feeling pretty good."   12.12.22: Patient sitting up in bedside chair. NAD. Crackles noted to right base. Shallow respirations. Mid-sternal and Left CW incision CINDI. Sites benign.     PMH: HTN, HLD, GERD, angina, Prostate cancer  PSH:   appendectomy, prostate surgery  Social History:  Denies EtOH, tobacco, and illicit drug use  Family History:  Father-CAD, MI, HTN     Previous Cardiac Diagnostics:   Venous US LLE 12/10/22:  No evidence of Deep or superficial vein thrombosis in LLE    Echo limited 12.6.22  Limited echo done by the bedside with physician post cardiac arrest; patient had open heart surgery 12/05/2022; definity used to evaluate LV ejection fraction for pacemaker evaluation.    The visually estimated ejection fraction is 35-40%.    Moderately decreased systolic function    Echo 12.3.22  Technically difficult study with limited visualization.  Definity used.    The estimated ejection fraction is 50%.    Left ventricle is normal in size with low-normal systolic function.    Grade 1 left ventricular diastolic dysfunction.    Carotid US 12.3.22  The right internal carotid artery demonstrated no hemodynamically significant stenosis   Left internal carotid artery demonstrated less than 50% stenosis.    Bilateral vertebral arteries were patent with antegrade flow    [facility] 12.2.22  1. LEFT MAIN:  MILD CALCIFICATION BUT NO OBSTRUCTIVE DISEASE.  2. LAD:  THE LAD SHOWS HEAVY CALCIFICATION PROXIMALLY.  THERE IS EVIDENCE OF A 95% STENOSIS PROXIMALLY INVOLVING " THE 1ST DIAGONAL.  THE 1ST DIAGONAL SHOWS 95% STENOSIS PROXIMALLY.  THE MID LAD SHOWS 90% STENOSIS.  3. LEFT CIRCUMFLEX:  90% PROXIMAL STENOSIS IS NOTED  4. RIGHT CORONARY ARTERY:  THE RIGHT CORONARY ARTERY SHOWS A LONG, VERY DISTAL 90% LESION WHICH COMPROMISES THE FLOW INTO THE PDA     GRAFT ANGIOGRAPHY:  1. LIMA TO THE LEFT SIDE OF THE CHEST WALL: WIDELY PATENT WITH NO DISEASE  2.  MECHE TO THE RIGHT SIDE OF THE CHEST WALL: WIDELY PATENT WITH NO DISEASE      Review of Systems   Cardiovascular:  Positive for chest pain. Negative for palpitations.        Incisional   Respiratory:  Negative for shortness of breath.    Psychiatric/Behavioral:  Negative for altered mental status.      Objective:     Vital Signs (Most Recent):  Temp: 98.1 °F (36.7 °C) (12/12/22 1217)  Pulse: 70 (12/12/22 1217)  Resp: 20 (12/12/22 1217)  BP: 124/68 (12/12/22 1217)  SpO2: 99 % (12/12/22 1217) Vital Signs (24h Range):  Temp:  [97.5 °F (36.4 °C)-99.3 °F (37.4 °C)] 98.1 °F (36.7 °C)  Pulse:  [70-84] 70  Resp:  [18-24] 20  SpO2:  [94 %-99 %] 99 %  BP: (109-133)/(63-73) 124/68     Weight: 78.9 kg (173 lb 15.1 oz)  Body mass index is 24.96 kg/m².    SpO2: 99 %  (RETIRED) O2 Device (Oxygen Therapy): room air      Intake/Output Summary (Last 24 hours) at 12/12/2022 1241  Last data filed at 12/12/2022 0559  Gross per 24 hour   Intake 1440 ml   Output 900 ml   Net 540 ml       Lines/Drains/Airways       Peripheral Intravenous Line  Duration                  Peripheral IV - Single Lumen 12/10/22 1115 20 G Left;Anterior Upper Arm 2 days                  Significant Labs: CMP   Recent Labs   Lab 12/11/22  0623 12/12/22  0432    139   K 3.8 4.1   CO2 23 25   BUN 14.1 12.8   CREATININE 0.96 0.92   CALCIUM 8.2* 8.2*   ALBUMIN 2.7* 2.7*   BILITOT 1.6* 1.9*   ALKPHOS 64 72   AST 23 27   ALT 20 25     , CBC   Recent Labs   Lab 12/11/22  0623 12/12/22  0432   WBC 8.3 9.6   HGB 9.1* 9.0*   HCT 26.6* 27.6*    235         Telemetry: SR    Physical  Exam  Constitutional:       Appearance: Normal appearance.   HENT:      Head: Normocephalic.      Mouth/Throat:      Mouth: Mucous membranes are moist.   Eyes:      Extraocular Movements: Extraocular movements intact.      Conjunctiva/sclera: Conjunctivae normal.   Cardiovascular:      Rate and Rhythm: Normal rate and regular rhythm.      Pulses: Normal pulses.      Heart sounds: Normal heart sounds.   Pulmonary:      Effort: Pulmonary effort is normal.      Breath sounds: Rales present.   Abdominal:      Palpations: Abdomen is soft.   Musculoskeletal:         General: Normal range of motion.      Cervical back: Neck supple.   Skin:     General: Skin is warm and dry.      Comments: Left chest wall pacer site and sternotomy incision CINDI. Site benign.    Neurological:      Mental Status: He is alert and oriented to person, place, and time. Mental status is at baseline.   Psychiatric:         Mood and Affect: Mood normal.         Behavior: Behavior normal.     Current Inpatient Medications:    Current Facility-Administered Medications:     0.9%  NaCl infusion (for blood administration), , Intravenous, Q24H PRN, Vini Ricks MD    acetaminophen oral solution 650 mg, 650 mg, Per OG tube, Q6H PRN, MARISOL Roca    acetaminophen tablet 650 mg, 650 mg, Oral, Q6H PRN, Yair Benson MD, 650 mg at 12/12/22 0955    albumin human 5% bottle 12.5 g, 12.5 g, Intravenous, PRN, MARISOL Roca, Stopped at 12/06/22 0820    [START ON 12/16/2022] amiodarone tablet 200 mg, 200 mg, Oral, Daily, NATASHA Ball    [START ON 12/13/2022] amiodarone tablet 200 mg, 200 mg, Oral, BID, NATASHA Ball, 200 mg at 12/10/22 2052    amiodarone tablet 400 mg, 400 mg, Oral, BID, NATASHA Ball, 400 mg at 12/12/22 0837    amLODIPine tablet 5 mg, 5 mg, Oral, Daily, Candido Martinez MD, 5 mg at 12/12/22 0838    aspirin EC tablet 81 mg, 81 mg, Oral, Daily, MARISOL Roca, 81 mg at 12/12/22 0837    atorvastatin tablet 80 mg, 80 mg, Oral, QHS,  Candido Martinez MD, 80 mg at 12/11/22 2053    bisacodyL suppository 10 mg, 10 mg, Rectal, Daily PRN, NATASHA Ball, 10 mg at 12/09/22 1725    calcium gluconate 1 g in NS IVPB (premixed), 1 g, Intravenous, PRN, MARISOL Roca    calcium gluconate 1 g in NS IVPB (premixed), 2 g, Intravenous, PRN, MARISOL Roca    calcium gluconate 1 g in NS IVPB (premixed), 3 g, Intravenous, PRN, MARISOL Roca    cefazolin (ANCEF) 2 gram in dextrose 5% 50 mL IVPB (premix), 2 g, Intravenous, On Call Procedure, MAIRSOL Brown    cefUROXime sodium 1.5 g in dextrose 5 % in water (D5W) 5 % 100 mL IVPB (MB+), 1.5 g, Intravenous, On Call Procedure, Vini Ricks MD    dextrose 10 % infusion, 12.5 g, Intravenous, PRN, MARISOL Roca    dextrose 10 % infusion, 25 g, Intravenous, PRN, MARISOL Roca    docusate sodium capsule 100 mg, 100 mg, Oral, BID, MARISOL Roca, 100 mg at 12/12/22 0838    enoxaparin injection 40 mg, 40 mg, Subcutaneous, Daily, MARISOL Roca, 40 mg at 12/11/22 1635    famotidine (PF) injection 20 mg, 20 mg, Intravenous, Daily, MARISOL Roca, 20 mg at 12/11/22 1029    folic acid tablet 1 mg, 1 mg, Oral, Daily, MARISOL Roca, 1 mg at 12/12/22 0838    HYDROcodone-acetaminophen 5-325 mg per tablet 1 tablet, 1 tablet, Oral, Q4H PRN, MARISOL Roca, 1 tablet at 12/09/22 1623    HYDROmorphone (PF) injection 0.2 mg, 0.2 mg, Intravenous, Q5 Min PRN, Boris Garland DO    lactulose 10 gram/15 ml solution 20 g, 20 g, Oral, Q6H PRN, MARISOL Roca, 20 g at 12/09/22 1614    loperamide capsule 2 mg, 2 mg, Oral, Continuous PRN, MARISOL Roca    losartan tablet 25 mg, 25 mg, Oral, Daily, Candido Martinez MD, 25 mg at 12/12/22 0838    magnesium oxide tablet 400 mg, 400 mg, Oral, BID, NATASHA Ball, 400 mg at 12/12/22 0837    melatonin tablet 6 mg, 6 mg, Oral, Nightly PRN, Candido Martinez MD, 6 mg at 12/09/22 0041    metoclopramide HCl injection 5 mg, 5 mg, Intravenous, Q6H PRN, MARISOL Roca    metoprolol  tartrate (LOPRESSOR) split tablet 12.5 mg, 12.5 mg, Oral, BID, MARISOL Roca, 12.5 mg at 12/12/22 0838    mupirocin 2 % ointment, , Nasal, On Call Procedure, MARISOL Brown    mupirocin 2 % ointment, , Topical (Top), Daily, Yair Benson MD, Given at 12/11/22 0900    nitroGLYCERIN SL tablet, , , PRN, Candido Martinez MD, 0.1 mg at 12/02/22 1754    oxyCODONE immediate release tablet 10 mg, 10 mg, Oral, Q4H PRN, MARISOL Roca, 10 mg at 12/06/22 0208    perflutren lipid microspheres injection 1.3 mL, 1.3 mL, Intravenous, Once, Rhys Samaniego MD    polyethylene glycol packet 17 g, 17 g, Oral, Daily, Yair Benson MD, 17 g at 12/12/22 0955    potassium chloride SA CR tablet 20 mEq, 20 mEq, Oral, BID, Jay Fall PA-C, 20 mEq at 12/12/22 0838    promethazine injection 25 mg, 25 mg, Intramuscular, Q4H PRN, Yair Benson MD, 25 mg at 12/06/22 1030    simethicone chewable tablet 80 mg, 1 tablet, Oral, TID PRN, Jayna Carrera MD, 80 mg at 12/10/22 0219    sodium chloride 0.9% flush 10 mL, 10 mL, Intravenous, PRN, Candido Martinez MD    sodium chloride 0.9% flush 10 mL, 10 mL, Intravenous, PRN, Boris Garland DO    sodium phosphate 15 mmol in dextrose 5 % 250 mL IVPB, 15 mmol, Intravenous, PRN, MARISOL Roca    sodium phosphate 20.01 mmol in dextrose 5 % 250 mL IVPB, 20.01 mmol, Intravenous, PRN, MARISOL Roca    sodium phosphate 30 mmol in dextrose 5 % 250 mL IVPB, 30 mmol, Intravenous, PRN, MARISOL Roca    sucralfate tablet 1 g, 1 g, Oral, Q6H, Candido Martinez MD, 1 g at 12/10/22 1210    Assessment:   NSTEMI Type I  MVCAD/CABG x 3 (LIMA To LAD, SVG to Diag, SVG to RPDA)  Sinus Arrest leading to Cardiac Arrest s/p PPM Implant per [facility] (11.8.22)    - PT was in a Junctional Rhythm which Lead to Sinus Arrest and then Asystole  Newly Afib/Post Operative (Not Captured on EKG)    - CHADsVASc - 4 Points - 4.8% Stroke Risk per Year   SSS/PPM (Placed on 11.8.22)   --[employer]  ICMO/EF 35-40%  HTN  HLD  GERD  LLE  Swelling - Preliminary Venous US Unremarkable  No Hx of GI Bleed    Plan:   Continue ASA, Statin, ARB, Amiodarone Load and BB   Continue oral amiodarone load. Change metoprolol tartrate to succinate as below.   Start eliquis 5 mg bid for CVA prophylaxis  Optimize GDMT- DC metoprolol tartrate. Start Metoprolol succinate 25 mg daily. Continue losartan 25 mg daily  Dc amlodipine to allow for uptitration of GDMT  Continue PT/OT Eval and Treat        DONOVAN Hassan  Cardiology  [facility]  12/12/2022

## 2022-12-12 NOTE — PT/OT/SLP PROGRESS
Physical Therapy Treatment    Patient Name:  Suresh Aragon   MRN:  [id number]    Recommendations:     Discharge Recommendations: home with home health  Discharge Equipment Recommendations: walker, rolling  Barriers to discharge: None    Assessment:     Suresh Aragon is a 77 y.o. male admitted with a medical diagnosis of Unstable angina.  He presents with the following impairments/functional limitations: impaired endurance .    Rehab Prognosis: Good; patient would benefit from acute skilled PT services to address these deficits and reach maximum level of function.    Recent Surgery: Procedure(s) (LRB):  INSERTION, CARDIAC PACEMAKER (N/A) 4 Days Post-Op    Plan:     During this hospitalization, patient to be seen daily to address the identified rehab impairments via gait training, therapeutic activities, therapeutic exercises and progress toward the following goals:    Plan of Care Expires:  23    Subjective     Chief Complaint: none  Patient/Family Comments/goals:   Pain/Comfort:         Objective:     Communicated with RN prior to session.  Patient found up in chair with   upon PT entry to room.     General Precautions: Standard, fall, sternal  Orthopedic Precautions:    Braces:    Respiratory Status: Room air     Functional Mobility:  Transfers:     Sit to Stand:  modified independence with rolling walker  Gait: Pt amb 170ft with RW SBA.      AM-PAC 6 CLICK MOBILITY          Treatment & Education:  Pt performed 7 reps of sit<>stands with RW Val    Patient left up in chair with all lines intact and call button in reach..    GOALS:   Multidisciplinary Problems       Physical Therapy Goals          Problem: Physical Therapy    Goal Priority Disciplines Outcome Goal Variances Interventions   Physical Therapy Goal     PT, PT/OT Ongoing, Progressing     Description: Goals to be met by: 23     Patient will increase functional independence with mobility by performin. Supine to sit with Set-up  [place]  2. Sit to supine with Set-up [place]  3. Sit to stand transfer with Supervision  4. Bed to chair transfer with Supervision using Rolling Walker  5. Gait  x 350 feet with Supervision using Rolling Walker.                          Time Tracking:     PT Received On: 12/12/22  PT Start Time: 1506     PT Stop Time: 1522  PT Total Time (min): 16 min     Billable Minutes: Gait Training 16    Treatment Type: Treatment  PT/PTA: PTA     PTA Visit Number: 1     12/12/2022

## 2022-12-12 NOTE — PT/OT/SLP DISCHARGE
Speech Language Pathology Discharge Summary    Suresh Aragon  MRN: [id number]   Unstable angina    Pt tolerating diet without signs/sx of aspiration. No skilled SLP intervention is warranted at this time.

## 2022-12-12 NOTE — PLAN OF CARE
Order for rehab noted. Spoke to daughter per phone. List of providers given. FOC obtained for [facility]. Referral sent via [facility].

## 2022-12-12 NOTE — PROGRESS NOTES
CT Surgery  Progress Note    Subjective:  S/p pacemaker insertion  Doing well this morning  No complaints  Ambulating  Tolerating diet  Good urine output, no BM    Objective:  Temp:  [97.5 °F (36.4 °C)-99.3 °F (37.4 °C)] 98.4 °F (36.9 °C)  Pulse:  [70-84] 75  Resp:  [18-24] 18  SpO2:  [94 %-97 %] 95 %  BP: (109-133)/(63-73) 126/63    Physical Exam:  Gen: no acute distress. Alert and oriented x3.  HEENT: normocephalic and atraumatic. EOMI.   Resp: unlabored respirations  CV: regular rate, Incisions with dressings C/D/I  Abd: soft, NT, ND  Ext: warm and well perfused  MSK: normal range of motion, normal strength  Neuro: no focal deficits, normal sensation    Assessment & Plan:  77 y.o. male with a hx of HTN, HLD, CAD s/p CABG x3 12/5 now s/p pacemaker insertion 12/8.    -Regular diet  -Continue OOB and ambulating  -Continue IS    Ppx: Lovenox for DVT ppx, pepcid for GI ppx.    Courtney Benitez MD  [employer] General Surgery - PGY4

## 2022-12-12 NOTE — PROGRESS NOTES
12/12/22 0900   Pre Exercise Vitals   /68   Pulse 84   Supplemental O2? No   SpO2 97 %   During Exercise Vitals   Pulse 87   Supplemental O2? No   SpO2 97 %   Distance Walked 250 feet   Post Exercise Vitals   /73   Pulse 85   Supplemental O2? No   SpO2 98 %   Modality   Modality Walker     Standby assist to stand; sternal precautions maintained; demonstrated IS @750; ambulated 200 feet using rolling walker, O2 sat @98% on room air

## 2022-12-13 PROCEDURE — 25000003 PHARM REV CODE 250: Performed by: NURSE PRACTITIONER

## 2022-12-13 PROCEDURE — 94760 N-INVAS EAR/PLS OXIMETRY 1: CPT

## 2022-12-13 PROCEDURE — 25000003 PHARM REV CODE 250: Performed by: INTERNAL MEDICINE

## 2022-12-13 PROCEDURE — 97110 THERAPEUTIC EXERCISES: CPT

## 2022-12-13 PROCEDURE — 25000003 PHARM REV CODE 250

## 2022-12-13 PROCEDURE — 97110 THERAPEUTIC EXERCISES: CPT | Mod: CQ

## 2022-12-13 PROCEDURE — 97116 GAIT TRAINING THERAPY: CPT | Mod: CQ

## 2022-12-13 PROCEDURE — 25000003 PHARM REV CODE 250: Performed by: PHYSICIAN ASSISTANT

## 2022-12-13 PROCEDURE — 99024 PR POST-OP FOLLOW-UP VISIT: ICD-10-PCS | Mod: ,,, | Performed by: PHYSICIAN ASSISTANT

## 2022-12-13 PROCEDURE — 99024 POSTOP FOLLOW-UP VISIT: CPT | Mod: ,,, | Performed by: PHYSICIAN ASSISTANT

## 2022-12-13 PROCEDURE — 21400001 HC TELEMETRY ROOM

## 2022-12-13 RX ADMIN — POTASSIUM CHLORIDE 20 MEQ: 1500 TABLET, EXTENDED RELEASE ORAL at 08:12

## 2022-12-13 RX ADMIN — APIXABAN 5 MG: 5 TABLET, FILM COATED ORAL at 08:12

## 2022-12-13 RX ADMIN — ASPIRIN 81 MG: 81 TABLET, COATED ORAL at 08:12

## 2022-12-13 RX ADMIN — ATORVASTATIN CALCIUM 80 MG: 40 TABLET, FILM COATED ORAL at 08:12

## 2022-12-13 RX ADMIN — FOLIC ACID 1 MG: 1 TABLET ORAL at 08:12

## 2022-12-13 RX ADMIN — METOPROLOL SUCCINATE 25 MG: 25 TABLET, EXTENDED RELEASE ORAL at 08:12

## 2022-12-13 RX ADMIN — AMIODARONE HYDROCHLORIDE 200 MG: 200 TABLET ORAL at 08:12

## 2022-12-13 RX ADMIN — DOCUSATE SODIUM 100 MG: 100 CAPSULE, LIQUID FILLED ORAL at 08:12

## 2022-12-13 RX ADMIN — MAGNESIUM OXIDE TAB 400 MG (241.3 MG ELEMENTAL MG) 400 MG: 400 (241.3 MG) TAB at 08:12

## 2022-12-13 RX ADMIN — POLYETHYLENE GLYCOL 3350 17 G: 17 POWDER, FOR SOLUTION ORAL at 08:12

## 2022-12-13 RX ADMIN — LOSARTAN POTASSIUM 25 MG: 25 TABLET, FILM COATED ORAL at 08:12

## 2022-12-13 NOTE — PROGRESS NOTES
Pod 8  Working c pt  Rehab eval sent  Vss  Heart sinus   Woradha c/d/I  Await rehab, will sign off

## 2022-12-13 NOTE — PT/OT/SLP PROGRESS
Physical Therapy Treatment    Patient Name:  Suersh Aragon   MRN:  [id number]    Recommendations:     Discharge Recommendations: home with home health  Discharge Equipment Recommendations: walker, rolling  Barriers to discharge: None    Assessment:     Suresh Aragon is a 77 y.o. male admitted with a medical diagnosis of Unstable angina.  He presents with the following impairments/functional limitations: impaired endurance .    Rehab Prognosis: Good; patient would benefit from acute skilled PT services to address these deficits and reach maximum level of function.    Recent Surgery: Procedure(s) (LRB):  INSERTION, CARDIAC PACEMAKER (N/A) 5 Days Post-Op    Plan:     During this hospitalization, patient to be seen daily to address the identified rehab impairments via gait training, therapeutic activities, therapeutic exercises and progress toward the following goals:    Plan of Care Expires:  23    Subjective     Chief Complaint: none  Patient/Family Comments/goals:   Pain/Comfort:         Objective:     Communicated with RN prior to session.  Patient found up in chair with   upon PT entry to room.     General Precautions: Standard, fall, sternal  Orthopedic Precautions:    Braces:    Respiratory Status: Room air     Functional Mobility:  Transfers:     Sit to Stand:  modified independence with rolling walker  Gait: Pt amb 200ft with SBA. No AD      AM-PAC 6 CLICK MOBILITY          Treatment & Education:  Pt performed LE therex UIC 15x1.    Patient left up in chair with all lines intact and call button in reach..    GOALS:   Multidisciplinary Problems       Physical Therapy Goals          Problem: Physical Therapy    Goal Priority Disciplines Outcome Goal Variances Interventions   Physical Therapy Goal     PT, PT/OT Ongoing, Progressing     Description: Goals to be met by: 23     Patient will increase functional independence with mobility by performin. Supine to sit with Set-up [place]  2. Sit to  supine with Set-up [place]  3. Sit to stand transfer with Supervision  4. Bed to chair transfer with Supervision using Rolling Walker  5. Gait  x 350 feet with Supervision using Rolling Walker.                          Time Tracking:     PT Received On: 12/13/22  PT Start Time: 1152     PT Stop Time: 1215  PT Total Time (min): 23 min     Billable Minutes: Gait Training 15 and Therapeutic Exercise 8    Treatment Type: Treatment  PT/PTA: PTA     PTA Visit Number: 2     12/13/2022

## 2022-12-13 NOTE — PROGRESS NOTES
CT Surgery  Progress Note    Subjective:  Doing well this morning  No complaints  Ambulating  Tolerating diet  Good urine output, passing flatus    Objective:  Temp:  [98.1 °F (36.7 °C)-98.9 °F (37.2 °C)] 98.2 °F (36.8 °C)  Pulse:  [70-84] 84  Resp:  [18-20] 18  SpO2:  [94 %-99 %] 96 %  BP: (123-146)/(68-74) 126/69    Physical Exam:  Gen: no acute distress. Alert and oriented x3.  HEENT: normocephalic and atraumatic. EOMI.   Resp: unlabored respirations  CV: regular rate, Incisions C/D/I  Abd: soft, NT, ND  Ext: warm and well perfused  MSK: normal range of motion, normal strength  Neuro: no focal deficits, normal sensation    Assessment & Plan:  77 y.o. male with a hx of HTN, HLD, CAD s/p CABG x3 12/5 now s/p pacemaker insertion 12/8.    -Regular diet  -Continue OOB and ambulating  -Continue IS  -Awaiting rehab placement  -CVTS to sign off, please contact with any further questions or concerns    Ppx: Lovenox for DVT ppx, pepcid for GI ppx.    Courtney Benitez MD  [employer] General Surgery - PGY4   Tremfya Counseling: I discussed with the patient the risks of guselkumab including but not limited to immunosuppression, serious infections, worsening of inflammatory bowel disease and drug reactions.  The patient understands that monitoring is required including a PPD at baseline and must alert us or the primary physician if symptoms of infection or other concerning signs are noted.

## 2022-12-13 NOTE — PROGRESS NOTES
[facility]  Cardiology  Progress Note    Patient Name: Suresh Aragno  MRN: [id number]  Admission Date: 12/1/2022  Hospital Length of Stay: 8 days  Code Status: Full Code   Attending Physician: Yair Benson MD   Primary Care Physician: No primary care provider on file.  Expected Discharge Date:   Principal Problem:Unstable angina    Subjective:     Brief HPI: This is a 77-year-old male, who is known to Dr. Martinez, with a history of HTN, HLD, GERD, angina.  Patient presents to the ER on 12/01/2022 with complaints of chest pain.  Of note patient was just seen in clinic by Dr. Martinez where he was advised to present to ER if had chest pain secondary to unstable angina prior to his scheduled outpatient LHC.  Patient stated the pain started approximately 7 days prior.  He stated the pain feels like a pressure and radiates down to his left arm.  He said the pain is worse with exertion and improves with rest.  He also had associated shortness of breath with exertion.  Initial troponin was negative however repeat troponins are now mildly elevated.  Initial EKG revealed normal sinus rhythm with a left axis deviation and LVH.  [facility] has admitted the patient for unstable angina.     Hospital Course:   12.3.22: NAD noted. VSS. SR on tele. Denies CP/SOB/Palps.  Right groin benign.  12.4.22: NAD noted. VSS. SR on tele. Denies CP/SOB/Palps. Awaiting CABG.  12.5.22: NAD noted. VSS. SR on tele. NPO for CABG today.  12.6.22:  Upon rounds this a.m. patient was in junctional rhythm with heart rate in the 90s.  Patient then stated he felt anxious and had sinus arrest with asystole.  Code was activated.  Patient was placed back in bed once patient in bed he recovered spontaneously without CPR or epinephrine.  Patient was placed back on external pacing.  Rate was attempted to be turned down to see underlying rhythm however patient continued to require pacing even pacer is lowest rate.  Upon recovery patient began vomiting and  "complained of feeling flushed.  Vital signs stable, no vasopressors required.  12.7.22: NAD noted. VSS. Paced on tele. VSS. Denies SOB/Palps, +incisional CP.  12.8.22: NAD noted. VSS. Paced on tele. Denies SOB/Palps, +incisional CP. NPO for PPM today.  12.9.22: NAD noted. VSS. SR on tele. Denies SOB/Palps, +incisional CP. CT Dcd.  12.10.22: NAD. VSS. Deneis SOB and Palps. + CP/Incisional. "I am ok."   12.11.22: NAD. VSS. Denies SOB and Palps. + CP/Incisional. "I feeling pretty good."   12.12.22: Patient sitting up in bedside chair. NAD. Crackles noted to right base. Shallow respirations. Mid-sternal and Left CW incision CINDI. Sites benign.   12.13.22: NAD. VSS. Sitting in Bedside Chair. Awaiting Rehab Placement. "I am feeling well." Denies CP, SOB and Palps.     PMH: HTN, HLD, GERD, Angina, Prostate Cancer  PSH:  Appendectomy, Prostate Surgery, Angiogram, PPM Implant  Social History:  Denies EtOH, tobacco, and illicit drug use  Family History:  Father-CAD, MI, HTN     Previous Cardiac Diagnostics:   Venous US LLE 12/10/22:  No evidence of Deep or superficial vein thrombosis in LLE    Echo limited 12.6.22  Limited echo done by the bedside with physician post cardiac arrest; patient had open heart surgery 12/05/2022; definity used to evaluate LV ejection fraction for pacemaker evaluation.    The visually estimated ejection fraction is 35-40%.    Moderately decreased systolic function    Echo 12.3.22  Technically difficult study with limited visualization.  Definity used.    The estimated ejection fraction is 50%.    Left ventricle is normal in size with low-normal systolic function.    Grade 1 left ventricular diastolic dysfunction.    Carotid US 12.3.22  The right internal carotid artery demonstrated no hemodynamically significant stenosis   Left internal carotid artery demonstrated less than 50% stenosis.    Bilateral vertebral arteries were patent with antegrade flow    [facility] 12.2.22  1. LEFT MAIN:  MILD CALCIFICATION " BUT NO OBSTRUCTIVE DISEASE.  2. LAD:  THE LAD SHOWS HEAVY CALCIFICATION PROXIMALLY.  THERE IS EVIDENCE OF A 95% STENOSIS PROXIMALLY INVOLVING THE 1ST DIAGONAL.  THE 1ST DIAGONAL SHOWS 95% STENOSIS PROXIMALLY.  THE MID LAD SHOWS 90% STENOSIS.  3. LEFT CIRCUMFLEX:  90% PROXIMAL STENOSIS IS NOTED  4. RIGHT CORONARY ARTERY:  THE RIGHT CORONARY ARTERY SHOWS A LONG, VERY DISTAL 90% LESION WHICH COMPROMISES THE FLOW INTO THE PDA     GRAFT ANGIOGRAPHY:  1. LIMA TO THE LEFT SIDE OF THE CHEST WALL: WIDELY PATENT WITH NO DISEASE  2.  MECHE TO THE RIGHT SIDE OF THE CHEST WALL: WIDELY PATENT WITH NO DISEASE      Review of Systems   Constitutional: Positive for malaise/fatigue.   Cardiovascular:  Negative for chest pain, orthopnea and palpitations.   Respiratory:  Negative for shortness of breath.    All other systems reviewed and are negative.    Objective:     Vital Signs (Most Recent):  Temp: 97.8 °F (36.6 °C) (12/13/22 1210)  Pulse: 77 (12/13/22 1210)  Resp: 20 (12/13/22 1210)  BP: 124/70 (12/13/22 1210)  SpO2: 99 % (12/13/22 1210) Vital Signs (24h Range):  Temp:  [97.8 °F (36.6 °C)-98.9 °F (37.2 °C)] 97.8 °F (36.6 °C)  Pulse:  [73-84] 77  Resp:  [18-20] 20  SpO2:  [94 %-99 %] 99 %  BP: (123-146)/(69-74) 124/70     Weight: 75.2 kg (165 lb 12.6 oz)  Body mass index is 23.79 kg/m².    SpO2: 99 %  (RETIRED) O2 Device (Oxygen Therapy): room air      Intake/Output Summary (Last 24 hours) at 12/13/2022 1536  Last data filed at 12/13/2022 1450  Gross per 24 hour   Intake 1500 ml   Output 1000 ml   Net 500 ml     Lines/Drains/Airways       Peripheral Intravenous Line  Duration                  Peripheral IV - Single Lumen 12/10/22 1115 20 G Left;Anterior Upper Arm 3 days                  Significant Labs: CMP   Recent Labs   Lab 12/12/22  0432      K 4.1   CO2 25   BUN 12.8   CREATININE 0.92   CALCIUM 8.2*   ALBUMIN 2.7*   BILITOT 1.9*   ALKPHOS 72   AST 27   ALT 25   , CBC   Recent Labs   Lab 12/12/22  0432   WBC 9.6   HGB 9.0*    HCT 27.6*      No results for input(s): TROPONINI in the last 48 hours.    Telemetry: SR    Physical Exam  Constitutional:       Appearance: Normal appearance.   HENT:      Head: Normocephalic.      Mouth/Throat:      Mouth: Mucous membranes are moist.   Eyes:      Extraocular Movements: Extraocular movements intact.   Cardiovascular:      Rate and Rhythm: Normal rate and regular rhythm.      Pulses: Normal pulses.      Heart sounds: Normal heart sounds.   Pulmonary:      Effort: Pulmonary effort is normal.      Breath sounds: Normal breath sounds.      Comments: On RA  Abdominal:      Palpations: Abdomen is soft.   Musculoskeletal:         General: Normal range of motion.      Cervical back: Neck supple.   Skin:     General: Skin is warm and dry.      Comments: L CW Pacer Site Edges Well Approximated with No Sign of Bleed/Infection; Midline Sternotomy CINDI with Edges Well Approximated, No Sign of Bleed/Infection.    Neurological:      General: No focal deficit present.      Mental Status: He is alert and oriented to person, place, and time. Mental status is at baseline.   Psychiatric:         Mood and Affect: Mood normal.         Behavior: Behavior normal.     Current Inpatient Medications:    Current Facility-Administered Medications:     0.9%  NaCl infusion (for blood administration), , Intravenous, Q24H PRN, Vini Ricks MD    acetaminophen oral solution 650 mg, 650 mg, Per OG tube, Q6H PRN, MARISOL Roca    acetaminophen tablet 650 mg, 650 mg, Oral, Q6H PRN, Yair Benson MD, 650 mg at 12/12/22 0955    albumin human 5% bottle 12.5 g, 12.5 g, Intravenous, PRN, MARISOL Roca, Stopped at 12/06/22 0820    [START ON 12/16/2022] amiodarone tablet 200 mg, 200 mg, Oral, Daily, NATASHA Ball    amiodarone tablet 200 mg, 200 mg, Oral, BID, NATASHA Ball, 200 mg at 12/13/22 0819    apixaban tablet 5 mg, 5 mg, Oral, BID, DONOVAN Hassan, 5 mg at 12/13/22 0813    aspirin EC tablet 81 mg, 81  mg, Oral, Daily, MARISOL Roca, 81 mg at 12/13/22 0819    atorvastatin tablet 80 mg, 80 mg, Oral, QHS, Candido Martinez MD, 80 mg at 12/12/22 2131    bisacodyL suppository 10 mg, 10 mg, Rectal, Daily PRN, NATASHA Ball, 10 mg at 12/09/22 1725    calcium gluconate 1 g in NS IVPB (premixed), 1 g, Intravenous, PRN, MARISOL Roca    calcium gluconate 1 g in NS IVPB (premixed), 2 g, Intravenous, PRN, MARISOL Roca    calcium gluconate 1 g in NS IVPB (premixed), 3 g, Intravenous, PRN, MARISOL Roca    cefazolin (ANCEF) 2 gram in dextrose 5% 50 mL IVPB (premix), 2 g, Intravenous, On Call Procedure, MARISOL Brown    cefUROXime sodium 1.5 g in dextrose 5 % in water (D5W) 5 % 100 mL IVPB (MB+), 1.5 g, Intravenous, On Call Procedure, Vini Ricks MD    dextrose 10 % infusion, 12.5 g, Intravenous, PRN, MARISOL Roca    dextrose 10 % infusion, 25 g, Intravenous, PRN, MARISOL Roca    docusate sodium capsule 100 mg, 100 mg, Oral, BID, MARISOL Roca, 100 mg at 12/13/22 0813    famotidine (PF) injection 20 mg, 20 mg, Intravenous, Daily, MARISOL Roca, 20 mg at 12/11/22 1029    folic acid tablet 1 mg, 1 mg, Oral, Daily, MARISOL Roca, 1 mg at 12/13/22 0813    HYDROcodone-acetaminophen 5-325 mg per tablet 1 tablet, 1 tablet, Oral, Q4H PRN, MARISOL Roca, 1 tablet at 12/09/22 1623    HYDROmorphone (PF) injection 0.2 mg, 0.2 mg, Intravenous, Q5 Min PRN, Boris Garland DO    lactulose 10 gram/15 ml solution 20 g, 20 g, Oral, Q6H PRN, MARISOL Roca, 20 g at 12/09/22 1614    loperamide capsule 2 mg, 2 mg, Oral, Continuous PRN, MARISOL Roca    losartan tablet 25 mg, 25 mg, Oral, Daily, Candido Martinez MD, 25 mg at 12/13/22 0813    magnesium oxide tablet 400 mg, 400 mg, Oral, BID, NATASHA Ball, 400 mg at 12/13/22 0813    melatonin tablet 6 mg, 6 mg, Oral, Nightly PRN, Candido Martinez MD, 6 mg at 12/09/22 0041    metoclopramide HCl injection 5 mg, 5 mg, Intravenous, Q6H PRN, MARISOL Roca    metoprolol  succinate (TOPROL-XL) 24 hr tablet 25 mg, 25 mg, Oral, Daily, Shea BRADLEY Harish, FNP, 25 mg at 12/13/22 0818    mupirocin 2 % ointment, , Nasal, On Call Procedure, MARISOL Brown    mupirocin 2 % ointment, , Topical (Top), Daily, Yair Benson MD, Given at 12/11/22 0900    nitroGLYCERIN SL tablet, , , PRN, Candido Martinez MD, 0.1 mg at 12/02/22 1754    oxyCODONE immediate release tablet 10 mg, 10 mg, Oral, Q4H PRN, MARISOL Roca, 10 mg at 12/06/22 0208    perflutren lipid microspheres injection 1.3 mL, 1.3 mL, Intravenous, Once, Rhys Samaniego MD    polyethylene glycol packet 17 g, 17 g, Oral, Daily, Yair Benson MD, 17 g at 12/13/22 0813    potassium chloride SA CR tablet 20 mEq, 20 mEq, Oral, BID, Jay Fall PA-C, 20 mEq at 12/13/22 0813    promethazine injection 25 mg, 25 mg, Intramuscular, Q4H PRN, Yair Benson MD, 25 mg at 12/06/22 1030    simethicone chewable tablet 80 mg, 1 tablet, Oral, TID PRN, Jayna Carrera MD, 80 mg at 12/10/22 0219    sodium chloride 0.9% flush 10 mL, 10 mL, Intravenous, PRN, Candido Martinez MD    sodium chloride 0.9% flush 10 mL, 10 mL, Intravenous, PRN, Boris Garland DO    sodium phosphate 15 mmol in dextrose 5 % 250 mL IVPB, 15 mmol, Intravenous, PRN, MARISOL Roca    sodium phosphate 20.01 mmol in dextrose 5 % 250 mL IVPB, 20.01 mmol, Intravenous, PRN, MARISOL Roca    sodium phosphate 30 mmol in dextrose 5 % 250 mL IVPB, 30 mmol, Intravenous, PRN, MARISOL Roca    sucralfate tablet 1 g, 1 g, Oral, Q6H, Candido Martinez MD, 1 g at 12/10/22 1210    Assessment:   NSTEMI Type I  MVCAD/CABG x 3 (LIMA To LAD, SVG to Diag, SVG to RPDA)  Sinus Arrest leading to Cardiac Arrest s/p PPM Implant per [facility] (11.8.22)    - PT was in a Junctional Rhythm which Lead to Sinus Arrest and then Asystole  Newly Afib/Post Operative (Not Captured on EKG)    - CHADsVASc - 4 Points - 4.8% Stroke Risk per Year   SSS/PPM (Placed on 11.8.22)     - [employer] ([facility] Implant - [place])  ICMO/EF  35-40%  HTN  HLD  GERD  LLE Swelling - Preliminary Venous US Unremarkable  No Hx of GI Bleed    Plan:   Continue ASA, Statin, ARB, Amiodarone Load and BB   Continue Eliquis Re CVA Risk Reduction   Continue PT/OT Eval and Treat  IP Rehab Placement Pending  Labs in AM: CBC, CMP and Mg    Jayy Jimenez MD  Cardiology  [facility]  12/13/2022

## 2022-12-13 NOTE — PT/OT/SLP PROGRESS
Occupational Therapy   Treatment    Name: Suresh Aragon  MRN: [id number]  Admitting Diagnosis: Unstable angina CAD s/p CABG x 3 12/5, s/p pacemaker 12/8  Recent Surgery: Procedure(s) (LRB):  INSERTION, CARDIAC PACEMAKER (N/A) 5 Days Post-Op    Recommendations:     Discharge Recommendations: rehabilitation facility   Discharge Equipment Recommendations: RW  Barriers to discharge: placement (referral sent to rehab facility per CM note)    Assessment:     Suresh Aragon is a 77 y.o. male with a medical diagnosis of Unstable angina. Performance deficits affecting function are weakness, impaired endurance, impaired self care skills, impaired functional mobility, other (comment) (sternal precautions).     Rehab Prognosis:  Good; patient would benefit from acute skilled OT services to address these deficits and reach maximum level of function.       Plan:     Patient to be seen daily to address the above listed problems via self-care/home management, therapeutic activities, therapeutic exercises  Plan of Care Expires: 12/23/22  Plan of Care Reviewed with: patient    Subjective     Pain/Comfort:  Pain Rating 1: 0/10    Objective:     Communicated with: RN prior to session. Patient found up in chair with telemetry upon OT entry to room.    General Precautions: Standard, fall, sternal pxns 2/2 CABG x3, pacemaker pxns  Braces: N/A  Respiratory Status: Room air  Vitals:  HR: Remained WNL with increase to 87 during activity     Occupational Performance:      Functional Mobility/Transfers:  Patient completed Sit <> Stand Transfers with CGA progressing to SBA provided with  BUEs positioned across chest, holding pillow.     Activities of Daily Living:  Feeding: Setup A provided.    Treatment & Education:  Pt performed sit to stand exercise 2x10 with CGA progressing to SBA provided with BUEs positioned across chest, holding pillow.  Pt participated in therapeutic exercise to increase strength and endurance of BLE needed for ADL/IADL  participation.    Patient left up in chair with all lines intact and call button in reach.    GOALS:   Multidisciplinary Problems       Occupational Therapy Goals          Problem: Occupational Therapy    Goal Priority Disciplines Outcome Interventions   Occupational Therapy Goal     OT, PT/OT Ongoing, Progressing    Description: Pt will complete UE dressing mod I  Pt will complete LE dressing mod I   Pt will complete functional transfers mod I  Pt will demonstrate competency sternal precautions for all ADL tasks and mobility mod I.  Pt will complete toileting mod I                         Time Tracking:     OT Date of Treatment: 12/13/22  OT Start Time: 1105  OT Stop Time: 1121  OT Total Time (min): 16 min    Billable Minutes:Self Care/Home Management 16 mins    OT/CINDI: OT     CINDI Visit Number: 3    12/13/2022

## 2022-12-13 NOTE — PROGRESS NOTES
12/13/22 1000   Pre Exercise Vitals   /67   Pulse 76   Supplemental O2? No   SpO2 96 %   During Exercise Vitals   Pulse 88   Supplemental O2? No   SpO2 98 %   Distance Walked 300 feet   Post Exercise Vitals   /74   Pulse 80   Supplemental O2? No   SpO2 96 %   Modality   Modality Walker     Standby assist to stand; sternal precautions maintained; demonstrated IS @750; ambulated 300 feet with rolling walker, O2 sat @98% on room air.

## 2022-12-14 LAB
ALBUMIN SERPL-MCNC: 3 GM/DL (ref 3.4–4.8)
ALBUMIN/GLOB SERPL: 1 RATIO (ref 1.1–2)
ALP SERPL-CCNC: 83 UNIT/L (ref 40–150)
ALT SERPL-CCNC: 24 UNIT/L (ref 0–55)
AST SERPL-CCNC: 21 UNIT/L (ref 5–34)
BASOPHILS # BLD AUTO: 0.05 X10(3)/MCL (ref 0–0.2)
BASOPHILS NFR BLD AUTO: 0.5 %
BILIRUBIN DIRECT+TOT PNL SERPL-MCNC: 2.7 MG/DL
BUN SERPL-MCNC: 16.7 MG/DL (ref 8.4–25.7)
CALCIUM SERPL-MCNC: 9.1 MG/DL (ref 8.8–10)
CHLORIDE SERPL-SCNC: 107 MMOL/L (ref 98–107)
CO2 SERPL-SCNC: 26 MMOL/L (ref 23–31)
CREAT SERPL-MCNC: 0.99 MG/DL (ref 0.73–1.18)
EOSINOPHIL # BLD AUTO: 0.25 X10(3)/MCL (ref 0–0.9)
EOSINOPHIL NFR BLD AUTO: 2.6 %
ERYTHROCYTE [DISTWIDTH] IN BLOOD BY AUTOMATED COUNT: 13.8 % (ref 11.5–17)
GFR SERPLBLD CREATININE-BSD FMLA CKD-EPI: >60 MLS/MIN/1.73/M2
GLOBULIN SER-MCNC: 2.9 GM/DL (ref 2.4–3.5)
GLUCOSE SERPL-MCNC: 102 MG/DL (ref 82–115)
HCT VFR BLD AUTO: 31.9 % (ref 42–52)
HGB BLD-MCNC: 10.2 GM/DL (ref 14–18)
IMM GRANULOCYTES # BLD AUTO: 0.05 X10(3)/MCL (ref 0–0.04)
IMM GRANULOCYTES NFR BLD AUTO: 0.5 %
LYMPHOCYTES # BLD AUTO: 1.82 X10(3)/MCL (ref 0.6–4.6)
LYMPHOCYTES NFR BLD AUTO: 19.1 %
MAGNESIUM SERPL-MCNC: 2.3 MG/DL (ref 1.6–2.6)
MCH RBC QN AUTO: 31.8 PG (ref 27–31)
MCHC RBC AUTO-ENTMCNC: 32 MG/DL (ref 33–36)
MCV RBC AUTO: 99.4 FL (ref 80–94)
MONOCYTES # BLD AUTO: 1.08 X10(3)/MCL (ref 0.1–1.3)
MONOCYTES NFR BLD AUTO: 11.3 %
NEUTROPHILS # BLD AUTO: 6.3 X10(3)/MCL (ref 2.1–9.2)
NEUTROPHILS NFR BLD AUTO: 66 %
NRBC BLD AUTO-RTO: 0 %
PLATELET # BLD AUTO: 320 X10(3)/MCL (ref 130–400)
PMV BLD AUTO: 9 FL (ref 7.4–10.4)
POTASSIUM SERPL-SCNC: 4.6 MMOL/L (ref 3.5–5.1)
PROT SERPL-MCNC: 5.9 GM/DL (ref 5.8–7.6)
RBC # BLD AUTO: 3.21 X10(6)/MCL (ref 4.7–6.1)
SODIUM SERPL-SCNC: 138 MMOL/L (ref 136–145)
WBC # SPEC AUTO: 9.6 X10(3)/MCL (ref 4.5–11.5)

## 2022-12-14 PROCEDURE — 21400001 HC TELEMETRY ROOM

## 2022-12-14 PROCEDURE — 25000003 PHARM REV CODE 250

## 2022-12-14 PROCEDURE — 83735 ASSAY OF MAGNESIUM: CPT | Performed by: NURSE PRACTITIONER

## 2022-12-14 PROCEDURE — 25000003 PHARM REV CODE 250: Performed by: INTERNAL MEDICINE

## 2022-12-14 PROCEDURE — 97110 THERAPEUTIC EXERCISES: CPT

## 2022-12-14 PROCEDURE — 97116 GAIT TRAINING THERAPY: CPT | Mod: CQ

## 2022-12-14 PROCEDURE — 94760 N-INVAS EAR/PLS OXIMETRY 1: CPT

## 2022-12-14 PROCEDURE — 36415 COLL VENOUS BLD VENIPUNCTURE: CPT | Performed by: NURSE PRACTITIONER

## 2022-12-14 PROCEDURE — 25000003 PHARM REV CODE 250: Performed by: NURSE PRACTITIONER

## 2022-12-14 PROCEDURE — 80053 COMPREHEN METABOLIC PANEL: CPT | Performed by: NURSE PRACTITIONER

## 2022-12-14 PROCEDURE — 85025 COMPLETE CBC W/AUTO DIFF WBC: CPT | Performed by: NURSE PRACTITIONER

## 2022-12-14 PROCEDURE — 97535 SELF CARE MNGMENT TRAINING: CPT

## 2022-12-14 PROCEDURE — 25000003 PHARM REV CODE 250: Performed by: PHYSICIAN ASSISTANT

## 2022-12-14 RX ADMIN — POLYETHYLENE GLYCOL 3350 17 G: 17 POWDER, FOR SOLUTION ORAL at 10:12

## 2022-12-14 RX ADMIN — FAMOTIDINE 20 MG: 10 INJECTION, SOLUTION INTRAVENOUS at 10:12

## 2022-12-14 RX ADMIN — DOCUSATE SODIUM 100 MG: 100 CAPSULE, LIQUID FILLED ORAL at 10:12

## 2022-12-14 RX ADMIN — APIXABAN 5 MG: 5 TABLET, FILM COATED ORAL at 08:12

## 2022-12-14 RX ADMIN — LACTULOSE 20 G: 10 SOLUTION ORAL at 01:12

## 2022-12-14 RX ADMIN — ATORVASTATIN CALCIUM 80 MG: 40 TABLET, FILM COATED ORAL at 08:12

## 2022-12-14 RX ADMIN — MAGNESIUM OXIDE TAB 400 MG (241.3 MG ELEMENTAL MG) 400 MG: 400 (241.3 MG) TAB at 10:12

## 2022-12-14 RX ADMIN — DOCUSATE SODIUM 100 MG: 100 CAPSULE, LIQUID FILLED ORAL at 08:12

## 2022-12-14 RX ADMIN — AMIODARONE HYDROCHLORIDE 200 MG: 200 TABLET ORAL at 08:12

## 2022-12-14 RX ADMIN — MAGNESIUM OXIDE TAB 400 MG (241.3 MG ELEMENTAL MG) 400 MG: 400 (241.3 MG) TAB at 08:12

## 2022-12-14 RX ADMIN — POTASSIUM CHLORIDE 20 MEQ: 1500 TABLET, EXTENDED RELEASE ORAL at 08:12

## 2022-12-14 RX ADMIN — ASPIRIN 81 MG: 81 TABLET, COATED ORAL at 10:12

## 2022-12-14 RX ADMIN — MUPIROCIN: 20 OINTMENT TOPICAL at 01:12

## 2022-12-14 RX ADMIN — POTASSIUM CHLORIDE 20 MEQ: 1500 TABLET, EXTENDED RELEASE ORAL at 10:12

## 2022-12-14 RX ADMIN — LOSARTAN POTASSIUM 25 MG: 25 TABLET, FILM COATED ORAL at 10:12

## 2022-12-14 RX ADMIN — APIXABAN 5 MG: 5 TABLET, FILM COATED ORAL at 10:12

## 2022-12-14 RX ADMIN — AMIODARONE HYDROCHLORIDE 200 MG: 200 TABLET ORAL at 10:12

## 2022-12-14 NOTE — PT/OT/SLP PROGRESS
Occupational Therapy   Treatment    Name: Suresh Aragon  MRN: [id number]  Admitting Diagnosis: Unstable angina CAD s/p CABG x 3 12/5, s/p pacemaker 12/8  Recent Surgery: Procedure(s) (LRB):  INSERTION, CARDIAC PACEMAKER (N/A) 6 Days Post-Op    Recommendations:     Discharge Recommendations: TCU vs home with HH (TBD)  Discharge Equipment Recommendations: Possibly RW  Barriers to discharge: Placement (Referral sent to TCU per CM's note)    Assessment:     Suresh Aragon is a 77 y.o. male with a medical diagnosis of Unstable angina. Performance deficits affecting function are weakness, impaired endurance, impaired self care skills, impaired functional mobility, other (comment) (sternal precautions).     Rehab Prognosis: Good; patient would benefit from acute skilled OT services to address these deficits and reach maximum level of function.       Plan:     Patient to be seen daily to address the above listed problems via self-care/home management, therapeutic activities, therapeutic exercises  Plan of Care Expires: 12/23/22  Plan of Care Reviewed with: patient    Subjective     Pain/Comfort:  Pain Rating 1: 0/10    Objective:     Communicated with: RN prior to session. Patient found up in chair with telemetry upon OT entry to room.    General Precautions: Standard, fall, sternal pxns 2/2 CABG x3, pacemaker pxns  Braces: N/A  Respiratory Status: Room air     Occupational Performance:      Functional Mobility/Transfers:  Patient completed Sit <> Stand Transfers with SBA provided and with no AD.  Pt performed long distance gait trial in hallway with SBA and standing rest break x1 provided with no AD.    Activities of Daily Living:  UB dressing: Pt able to don button-down shirt while seated in chair with SBA and verbal and visual cues provided.  LB dressing: Pt doffed socks and donned pants and slippers with SBA provided and with no AD.    Treatment & Education:  OT provided re-education on sternal pxns (move in the tube) and  provided education on how to don button-down shirt while adhering to current pxns in order to increase pt's safety and independence with UB dressing task.  OT also provided education on proper breathing technique to conserve pt's energy during ADL/IADL participation.    Patient left up in chair with all lines intact and call button in reach.    GOALS:   Multidisciplinary Problems       Occupational Therapy Goals          Problem: Occupational Therapy    Goal Priority Disciplines Outcome Interventions   Occupational Therapy Goal     OT, PT/OT Ongoing, Progressing    Description: Pt will complete UE dressing mod I  Pt will complete LE dressing mod I   Pt will complete functional transfers mod I  Pt will demonstrate competency sternal precautions for all ADL tasks and mobility mod I.  Pt will complete toileting mod I                         Time Tracking:     OT Date of Treatment: 12/14/22  OT Start Time: 1044  OT Stop Time: 1111  OT Total Time (min): 27 min    Billable Minutes:Self Care/Home Management 27 mins    OT/CINDI: OT     CINDI Visit Number: 4    12/14/2022

## 2022-12-14 NOTE — PLAN OF CARE
[facility] declined rehab and offered a peer to peer. Notified NP, no peer to peer scheduled. Order for SNF noted. List of providers given to daughter per phone. FOC obtained for TCU. Referral sent via [facility].

## 2022-12-14 NOTE — PROGRESS NOTES
[facility]  Cardiology  Progress Note    Patient Name: Suresh Aragon  MRN: [id number]  Admission Date: 12/1/2022  Hospital Length of Stay: 9 days  Code Status: Full Code   Attending Physician: Yair Benson MD   Primary Care Physician: No primary care provider on file.  Expected Discharge Date:   Principal Problem:Unstable angina    Subjective:     Brief HPI: This is a 77-year-old male, who is known to Dr. Martinez, with a history of HTN, HLD, GERD, angina.  Patient presents to the ER on 12/01/2022 with complaints of chest pain.  Of note patient was just seen in clinic by Dr. Martinez where he was advised to present to ER if had chest pain secondary to unstable angina prior to his scheduled outpatient LHC.  Patient stated the pain started approximately 7 days prior.  He stated the pain feels like a pressure and radiates down to his left arm.  He said the pain is worse with exertion and improves with rest.  He also had associated shortness of breath with exertion.  Initial troponin was negative however repeat troponins are now mildly elevated.  Initial EKG revealed normal sinus rhythm with a left axis deviation and LVH.  [facility] has admitted the patient for unstable angina.     Hospital Course:   12.3.22: NAD noted. VSS. SR on tele. Denies CP/SOB/Palps.  Right groin benign.  12.4.22: NAD noted. VSS. SR on tele. Denies CP/SOB/Palps. Awaiting CABG.  12.5.22: NAD noted. VSS. SR on tele. NPO for CABG today.  12.6.22:  Upon rounds this a.m. patient was in junctional rhythm with heart rate in the 90s.  Patient then stated he felt anxious and had sinus arrest with asystole.  Code was activated.  Patient was placed back in bed once patient in bed he recovered spontaneously without CPR or epinephrine.  Patient was placed back on external pacing.  Rate was attempted to be turned down to see underlying rhythm however patient continued to require pacing even pacer is lowest rate.  Upon recovery patient began vomiting and  "complained of feeling flushed.  Vital signs stable, no vasopressors required.  12.7.22: NAD noted. VSS. Paced on tele. VSS. Denies SOB/Palps, +incisional CP.  12.8.22: NAD noted. VSS. Paced on tele. Denies SOB/Palps, +incisional CP. NPO for PPM today.  12.9.22: NAD noted. VSS. SR on tele. Denies SOB/Palps, +incisional CP. CT Dcd.  12.10.22: NAD. VSS. Deneis SOB and Palps. + CP/Incisional. "I am ok."   12.11.22: NAD. VSS. Denies SOB and Palps. + CP/Incisional. "I feeling pretty good."   12.12.22: Patient sitting up in bedside chair. NAD. Crackles noted to right base. Shallow respirations. Mid-sternal and Left CW incision CINDI. Sites benign.   12.13.22: NAD. VSS. Sitting in Bedside Chair. Awaiting Rehab Placement. "I am feeling well." Denies CP, SOB and Palps.   12.14.22: NAD. VSS. Sitting in Bedside Chair. Pending SNF Placement, Rehab Denied by Insurance. "I am ready to get out of here." Denies CP, SOB and Palps.     PMH: HTN, HLD, GERD, Angina, Prostate Cancer  PSH:  Appendectomy, Prostate Surgery, Angiogram, PPM Implant  Social History:  Denies EtOH, tobacco, and illicit drug use  Family History:  Father-CAD, MI, HTN     Previous Cardiac Diagnostics:   Venous US LLE 12/10/22:  No evidence of Deep or superficial vein thrombosis in LLE    Echo limited 12.6.22  Limited echo done by the bedside with physician post cardiac arrest; patient had open heart surgery 12/05/2022; definity used to evaluate LV ejection fraction for pacemaker evaluation.    The visually estimated ejection fraction is 35-40%.    Moderately decreased systolic function    Echo 12.3.22  Technically difficult study with limited visualization.  Definity used.    The estimated ejection fraction is 50%.    Left ventricle is normal in size with low-normal systolic function.    Grade 1 left ventricular diastolic dysfunction.    Carotid US 12.3.22  The right internal carotid artery demonstrated no hemodynamically significant stenosis   Left internal carotid " artery demonstrated less than 50% stenosis.    Bilateral vertebral arteries were patent with antegrade flow    [facility] 12.2.22  1. LEFT MAIN:  MILD CALCIFICATION BUT NO OBSTRUCTIVE DISEASE.  2. LAD:  THE LAD SHOWS HEAVY CALCIFICATION PROXIMALLY.  THERE IS EVIDENCE OF A 95% STENOSIS PROXIMALLY INVOLVING THE 1ST DIAGONAL.  THE 1ST DIAGONAL SHOWS 95% STENOSIS PROXIMALLY.  THE MID LAD SHOWS 90% STENOSIS.  3. LEFT CIRCUMFLEX:  90% PROXIMAL STENOSIS IS NOTED  4. RIGHT CORONARY ARTERY:  THE RIGHT CORONARY ARTERY SHOWS A LONG, VERY DISTAL 90% LESION WHICH COMPROMISES THE FLOW INTO THE PDA     GRAFT ANGIOGRAPHY:  1. LIMA TO THE LEFT SIDE OF THE CHEST WALL: WIDELY PATENT WITH NO DISEASE  2.  MECHE TO THE RIGHT SIDE OF THE CHEST WALL: WIDELY PATENT WITH NO DISEASE      Review of Systems   Constitutional: Negative for malaise/fatigue.   Cardiovascular:  Negative for chest pain, orthopnea and palpitations.   Respiratory:  Negative for shortness of breath.    All other systems reviewed and are negative.    Objective:     Vital Signs (Most Recent):  Temp: 98.2 °F (36.8 °C) (12/14/22 1200)  Pulse: 80 (12/14/22 1200)  Resp: 20 (12/14/22 1200)  BP: 123/74 (12/14/22 1200)  SpO2: 99 % (12/14/22 1200) Vital Signs (24h Range):  Temp:  [98 °F (36.7 °C)-98.4 °F (36.9 °C)] 98.2 °F (36.8 °C)  Pulse:  [74-80] 80  Resp:  [16-20] 20  SpO2:  [95 %-99 %] 99 %  BP: (117-123)/(62-75) 123/74     Weight: 75.2 kg (165 lb 12.6 oz)  Body mass index is 23.79 kg/m².    SpO2: 99 %  (RETIRED) O2 Device (Oxygen Therapy): room air      Intake/Output Summary (Last 24 hours) at 12/14/2022 1345  Last data filed at 12/14/2022 0317  Gross per 24 hour   Intake 780 ml   Output 2000 ml   Net -1220 ml       Lines/Drains/Airways       Peripheral Intravenous Line  Duration                  Peripheral IV - Single Lumen 12/10/22 1115 20 G Left;Anterior Upper Arm 4 days                  Significant Labs: CMP   Recent Labs   Lab 12/14/22  0610      K 4.6   CO2 26   BUN  16.7   CREATININE 0.99   CALCIUM 9.1   ALBUMIN 3.0*   BILITOT 2.7*   ALKPHOS 83   AST 21   ALT 24     , CBC   Recent Labs   Lab 12/14/22  0610   WBC 9.6   HGB 10.2*   HCT 31.9*        No results for input(s): TROPONINI in the last 48 hours.    Telemetry: SR    Physical Exam  Constitutional:       Appearance: Normal appearance.   HENT:      Head: Normocephalic.      Mouth/Throat:      Mouth: Mucous membranes are moist.   Eyes:      Extraocular Movements: Extraocular movements intact.   Cardiovascular:      Rate and Rhythm: Normal rate and regular rhythm.      Pulses: Normal pulses.      Heart sounds: Normal heart sounds.   Pulmonary:      Effort: Pulmonary effort is normal.      Breath sounds: Normal breath sounds.      Comments: On RA  Abdominal:      Palpations: Abdomen is soft.   Musculoskeletal:         General: Normal range of motion.      Cervical back: Neck supple.   Skin:     General: Skin is warm and dry.      Comments: L CW Pacer Site Edges Well Approximated with No Sign of Bleed/Infection; Midline Sternotomy CINDI with Edges Well Approximated, No Sign of Bleed/Infection.    Neurological:      General: No focal deficit present.      Mental Status: He is alert and oriented to person, place, and time. Mental status is at baseline.   Psychiatric:         Mood and Affect: Mood normal.         Behavior: Behavior normal.     Current Inpatient Medications:    Current Facility-Administered Medications:     0.9%  NaCl infusion (for blood administration), , Intravenous, Q24H PRN, Vini Ricks MD    acetaminophen oral solution 650 mg, 650 mg, Per OG tube, Q6H PRN, MARISOL Roca    acetaminophen tablet 650 mg, 650 mg, Oral, Q6H PRN, Yair Benson MD, 650 mg at 12/12/22 0955    albumin human 5% bottle 12.5 g, 12.5 g, Intravenous, PRN, MARISOL Roca, Stopped at 12/06/22 0820    [START ON 12/16/2022] amiodarone tablet 200 mg, 200 mg, Oral, Daily, NATASHA Ball    amiodarone tablet 200 mg, 200 mg, Oral, BID,  NATASHA Ball, 200 mg at 12/14/22 1001    apixaban tablet 5 mg, 5 mg, Oral, BID, DONOVAN Hassan, 5 mg at 12/14/22 1000    aspirin EC tablet 81 mg, 81 mg, Oral, Daily, MARISOL Roca, 81 mg at 12/14/22 1000    atorvastatin tablet 80 mg, 80 mg, Oral, QHS, Candido Martinez MD, 80 mg at 12/13/22 2041    bisacodyL suppository 10 mg, 10 mg, Rectal, Daily PRN, NATASHA Ball, 10 mg at 12/09/22 1725    calcium gluconate 1 g in NS IVPB (premixed), 1 g, Intravenous, PRN, MARISOL Roca    calcium gluconate 1 g in NS IVPB (premixed), 2 g, Intravenous, PRN, MARISOL Roca    calcium gluconate 1 g in NS IVPB (premixed), 3 g, Intravenous, PRN, MARISOL Roca    cefazolin (ANCEF) 2 gram in dextrose 5% 50 mL IVPB (premix), 2 g, Intravenous, On Call Procedure, MARISOL Brown    cefUROXime sodium 1.5 g in dextrose 5 % in water (D5W) 5 % 100 mL IVPB (MB+), 1.5 g, Intravenous, On Call Procedure, Vini Ricks MD    dextrose 10 % infusion, 12.5 g, Intravenous, PRN, MARISOL Roca    dextrose 10 % infusion, 25 g, Intravenous, PRN, MARISOL Roca    docusate sodium capsule 100 mg, 100 mg, Oral, BID, MARISOL Roca, 100 mg at 12/14/22 1001    famotidine (PF) injection 20 mg, 20 mg, Intravenous, Daily, MARISOL Roca, 20 mg at 12/14/22 1000    folic acid tablet 1 mg, 1 mg, Oral, Daily, MARISOL Roca, 1 mg at 12/13/22 0813    HYDROcodone-acetaminophen 5-325 mg per tablet 1 tablet, 1 tablet, Oral, Q4H PRN, MARISOL Roca, 1 tablet at 12/09/22 1623    HYDROmorphone (PF) injection 0.2 mg, 0.2 mg, Intravenous, Q5 Min PRN, Boris Garland DO    lactulose 10 gram/15 ml solution 20 g, 20 g, Oral, Q6H PRN, MAIRSOL Roca, 20 g at 12/14/22 1342    loperamide capsule 2 mg, 2 mg, Oral, Continuous PRN, MARISOL Roca    losartan tablet 25 mg, 25 mg, Oral, Daily, Candido Martinez MD, 25 mg at 12/14/22 1000    magnesium oxide tablet 400 mg, 400 mg, Oral, BID, NATASHA Ball, 400 mg at 12/14/22 1001    melatonin tablet 6 mg,  6 mg, Oral, Nightly PRN, Candido Martinez MD, 6 mg at 12/09/22 0041    metoclopramide HCl injection 5 mg, 5 mg, Intravenous, Q6H PRN, MARISOL Roca    metoprolol succinate (TOPROL-XL) 24 hr tablet 25 mg, 25 mg, Oral, Daily, Shea BRADLEY Harish, FNP, 25 mg at 12/13/22 0818    mupirocin 2 % ointment, , Nasal, On Call Procedure, MARISOL Brown    mupirocin 2 % ointment, , Topical (Top), Daily, Yair Benson MD, Given at 12/14/22 1343    nitroGLYCERIN SL tablet, , , PRN, Candido Martinez MD, 0.1 mg at 12/02/22 1754    oxyCODONE immediate release tablet 10 mg, 10 mg, Oral, Q4H PRN, MARISOL Roca, 10 mg at 12/06/22 0208    perflutren lipid microspheres injection 1.3 mL, 1.3 mL, Intravenous, Once, Rhys Samaniego MD    polyethylene glycol packet 17 g, 17 g, Oral, Daily, Yair Benson MD, 17 g at 12/14/22 1000    potassium chloride SA CR tablet 20 mEq, 20 mEq, Oral, BID, Jay Fall PA-C, 20 mEq at 12/14/22 1000    promethazine injection 25 mg, 25 mg, Intramuscular, Q4H PRN, Yair Benson MD, 25 mg at 12/06/22 1030    simethicone chewable tablet 80 mg, 1 tablet, Oral, TID PRN, Jayna Carrera MD, 80 mg at 12/10/22 0219    sodium chloride 0.9% flush 10 mL, 10 mL, Intravenous, PRN, Candido Martinez MD    sodium chloride 0.9% flush 10 mL, 10 mL, Intravenous, PRN, Boris Garland DO    sodium phosphate 15 mmol in dextrose 5 % 250 mL IVPB, 15 mmol, Intravenous, PRN, MARISOL Roca    sodium phosphate 20.01 mmol in dextrose 5 % 250 mL IVPB, 20.01 mmol, Intravenous, PRN, MARISOL Roca    sodium phosphate 30 mmol in dextrose 5 % 250 mL IVPB, 30 mmol, Intravenous, PRN, MARISOL Roca    sucralfate tablet 1 g, 1 g, Oral, Q6H, Candido Martinez MD, 1 g at 12/10/22 1210    Assessment:   NSTEMI Type I  MVCAD/CABG x 3 (LIMA To LAD, SVG to Diag, SVG to RPDA)  Sinus Arrest leading to Cardiac Arrest s/p PPM Implant per [facility] (11.8.22)    - PT was in a Junctional Rhythm which Lead to Sinus Arrest and then Asystole  Newly Afib/Post  Operative (Not Captured on EKG)    - CHADsVASc - 4 Points - 4.8% Stroke Risk per Year   SSS/PPM (Placed on 11.8.22)     - [employer] ([facility] Implant - [place])  ICMO/EF 35-40%  HTN  HLD  GERD  LLE Swelling - Preliminary Venous US Unremarkable  No Hx of GI Bleed    Plan:   Continue ASA, Statin, ARB, Amiodarone Load and BB   Continue Eliquis Re CVA Risk Reduction   Continue PT/OT Eval and Treat  SNF Placement Pending  Labs in AM: CBC, CMP and Mg    Lucas Murillo, ANP  Cardiology  [facility]  12/14/2022

## 2022-12-14 NOTE — PLAN OF CARE
Problem: Adult Inpatient Plan of Care  Goal: Plan of Care Review  Outcome: Ongoing, Progressing  Goal: Optimal Comfort and Wellbeing  Outcome: Ongoing, Progressing  Goal: Readiness for Transition of Care  Outcome: Ongoing, Progressing     Problem: Fall Injury Risk  Goal: Absence of Fall and Fall-Related Injury  Outcome: Ongoing, Progressing     Problem: Infection  Goal: Absence of Infection Signs and Symptoms  Outcome: Ongoing, Progressing     Problem: Skin Injury Risk Increased  Goal: Skin Health and Integrity  Outcome: Ongoing, Progressing

## 2022-12-14 NOTE — PROGRESS NOTES
12/14/22 0825   Pre Exercise Vitals   /70   Pulse 81   Supplemental O2? No   SpO2 98 %   During Exercise Vitals   Pulse 94   Supplemental O2? No   SpO2 97 %   Distance Walked 300 feet   Post Exercise Vitals   /72   Pulse 81   Supplemental O2? No   SpO2 98 %   Modality   Modality Walker     Standby assist only. Sternal precautions maintained. Gait steady with rollator. Tolerated well. C/o no bowel movement since surgery. Reported to Nurse.

## 2022-12-14 NOTE — PT/OT/SLP PROGRESS
Physical Therapy Treatment    Patient Name:  Suresh Aragon   MRN:  [id number]    Recommendations:     Discharge Recommendations: home with home health  Discharge Equipment Recommendations: walker, rolling  Barriers to discharge: None    Assessment:     Suresh Aragon is a 77 y.o. male admitted with a medical diagnosis of Unstable angina.  He presents with the following impairments/functional limitations: impaired endurance .    Rehab Prognosis: Good; patient would benefit from acute skilled PT services to address these deficits and reach maximum level of function.    Recent Surgery: Procedure(s) (LRB):  INSERTION, CARDIAC PACEMAKER (N/A) 6 Days Post-Op    Plan:     During this hospitalization, patient to be seen daily to address the identified rehab impairments via gait training, therapeutic activities, therapeutic exercises and progress toward the following goals:    Plan of Care Expires:  23    Subjective     Chief Complaint: none  Patient/Family Comments/goals:   Pain/Comfort:         Objective:     Communicated with RN prior to session.  Patient found up in chair with   upon PT entry to room.     General Precautions: Standard, fall, sternal  Orthopedic Precautions:    Braces:    Respiratory Status: Room air     Functional Mobility:  Transfers:     Sit to Stand:  independence with no AD  Gait: Pt amb 400ft no AD supervision  Balance: Standing balance indep      AM-PAC 6 CLICK MOBILITY          Treatment & Education:  Educated pt on daily therex to perform when OOB.    Patient left up in chair with all lines intact, call button in reach, and RN notified..    GOALS:   Multidisciplinary Problems       Physical Therapy Goals          Problem: Physical Therapy    Goal Priority Disciplines Outcome Goal Variances Interventions   Physical Therapy Goal     PT, PT/OT Ongoing, Progressing     Description: Goals to be met by: 23     Patient will increase functional independence with mobility by performin.  Supine to sit with Set-up [place]  2. Sit to supine with Set-up [place]  3. Sit to stand transfer with Supervision  4. Bed to chair transfer with Supervision using Rolling Walker  5. Gait  x 350 feet with Supervision using Rolling Walker.                          Time Tracking:     PT Received On: 12/14/22  PT Start Time: 1440     PT Stop Time: 1456  PT Total Time (min): 16 min     Billable Minutes: Gait Training 16    Treatment Type: Treatment  PT/PTA: PTA     PTA Visit Number: 3     12/14/2022

## 2022-12-15 VITALS
DIASTOLIC BLOOD PRESSURE: 72 MMHG | TEMPERATURE: 98 F | SYSTOLIC BLOOD PRESSURE: 129 MMHG | WEIGHT: 165.81 LBS | OXYGEN SATURATION: 97 % | RESPIRATION RATE: 18 BRPM | HEIGHT: 70 IN | BODY MASS INDEX: 23.74 KG/M2 | HEART RATE: 79 BPM

## 2022-12-15 PROBLEM — I25.10 CORONARY ARTERY DISEASE INVOLVING NATIVE CORONARY ARTERY OF NATIVE HEART WITHOUT ANGINA PECTORIS: Status: ACTIVE | Noted: 2022-12-15

## 2022-12-15 PROBLEM — I46.9 CARDIAC ARREST: Status: RESOLVED | Noted: 2022-12-15 | Resolved: 2022-12-15

## 2022-12-15 PROBLEM — I46.9 CARDIAC ARREST: Status: ACTIVE | Noted: 2022-12-15

## 2022-12-15 PROBLEM — Z95.0 PACEMAKER: Status: ACTIVE | Noted: 2022-12-15

## 2022-12-15 PROBLEM — I20.0 UNSTABLE ANGINA: Status: RESOLVED | Noted: 2022-12-02 | Resolved: 2022-12-15

## 2022-12-15 PROBLEM — Z95.1 HX OF CABG: Status: ACTIVE | Noted: 2022-12-15

## 2022-12-15 LAB
ALBUMIN SERPL-MCNC: 3 G/DL (ref 3.4–4.8)
ALBUMIN/GLOB SERPL: 1.3 RATIO (ref 1.1–2)
ALP SERPL-CCNC: 95 UNIT/L (ref 40–150)
ALT SERPL-CCNC: 22 UNIT/L (ref 0–55)
AST SERPL-CCNC: 21 UNIT/L (ref 5–34)
BASOPHILS # BLD AUTO: 0.08 X10(3)/MCL (ref 0–0.2)
BASOPHILS NFR BLD AUTO: 0.8 %
BILIRUBIN DIRECT+TOT PNL SERPL-MCNC: 2.3 MG/DL
BUN SERPL-MCNC: 19.2 MG/DL (ref 8.4–25.7)
CALCIUM SERPL-MCNC: 8.2 MG/DL (ref 8.8–10)
CHLORIDE SERPL-SCNC: 107 MMOL/L (ref 98–107)
CO2 SERPL-SCNC: 24 MMOL/L (ref 23–31)
CREAT SERPL-MCNC: 0.98 MG/DL (ref 0.73–1.18)
EOSINOPHIL # BLD AUTO: 0.29 X10(3)/MCL (ref 0–0.9)
EOSINOPHIL NFR BLD AUTO: 2.8 %
ERYTHROCYTE [DISTWIDTH] IN BLOOD BY AUTOMATED COUNT: 13.7 % (ref 11.6–14.4)
GFR SERPLBLD CREATININE-BSD FMLA CKD-EPI: >60 MLS/MIN/1.73/M2
GLOBULIN SER-MCNC: 2.3 GM/DL (ref 2.4–3.5)
GLUCOSE SERPL-MCNC: 113 MG/DL (ref 82–115)
HCT VFR BLD AUTO: 29.7 % (ref 42–52)
HGB BLD-MCNC: 9.5 GM/DL (ref 14–18)
IMM GRANULOCYTES # BLD AUTO: 0.07 X10(3)/MCL (ref 0–0.04)
IMM GRANULOCYTES NFR BLD AUTO: 0.7 %
LYMPHOCYTES # BLD AUTO: 2.09 X10(3)/MCL (ref 0.6–4.6)
LYMPHOCYTES NFR BLD AUTO: 19.9 %
MAGNESIUM SERPL-MCNC: 2.3 MG/DL (ref 1.6–2.6)
MCH RBC QN AUTO: 32 PG
MCHC RBC AUTO-ENTMCNC: 32 MG/DL (ref 33–36)
MCV RBC AUTO: 100 FL (ref 80–94)
MONOCYTES # BLD AUTO: 1.23 X10(3)/MCL (ref 0.1–1.3)
MONOCYTES NFR BLD AUTO: 11.7 %
NEUTROPHILS # BLD AUTO: 6.72 X10(3)/MCL (ref 2.1–9.2)
NEUTROPHILS NFR BLD AUTO: 64.1 %
NRBC BLD AUTO-RTO: 0 % (ref 0–1)
PLATELET # BLD AUTO: 341 X10(3)/MCL (ref 140–371)
PMV BLD AUTO: 9.3 FL (ref 9.4–12.4)
POTASSIUM SERPL-SCNC: 4.6 MMOL/L (ref 3.5–5.1)
PROT SERPL-MCNC: 5.3 GM/DL (ref 5.8–7.6)
RBC # BLD AUTO: 2.97 X10(6)/MCL (ref 4.7–6.1)
SODIUM SERPL-SCNC: 138 MMOL/L (ref 136–145)
WBC # SPEC AUTO: 10.5 X10(3)/MCL (ref 4.5–11.5)

## 2022-12-15 PROCEDURE — 25000003 PHARM REV CODE 250: Performed by: PHYSICIAN ASSISTANT

## 2022-12-15 PROCEDURE — 25000003 PHARM REV CODE 250: Performed by: NURSE PRACTITIONER

## 2022-12-15 PROCEDURE — 25000003 PHARM REV CODE 250: Performed by: INTERNAL MEDICINE

## 2022-12-15 PROCEDURE — 94760 N-INVAS EAR/PLS OXIMETRY 1: CPT

## 2022-12-15 PROCEDURE — 80053 COMPREHEN METABOLIC PANEL: CPT | Performed by: NURSE PRACTITIONER

## 2022-12-15 PROCEDURE — 97535 SELF CARE MNGMENT TRAINING: CPT

## 2022-12-15 PROCEDURE — 97116 GAIT TRAINING THERAPY: CPT

## 2022-12-15 PROCEDURE — 85025 COMPLETE CBC W/AUTO DIFF WBC: CPT | Performed by: NURSE PRACTITIONER

## 2022-12-15 PROCEDURE — 36415 COLL VENOUS BLD VENIPUNCTURE: CPT | Performed by: NURSE PRACTITIONER

## 2022-12-15 PROCEDURE — 83735 ASSAY OF MAGNESIUM: CPT | Performed by: NURSE PRACTITIONER

## 2022-12-15 PROCEDURE — 97110 THERAPEUTIC EXERCISES: CPT

## 2022-12-15 PROCEDURE — 25000003 PHARM REV CODE 250

## 2022-12-15 RX ORDER — METOPROLOL SUCCINATE 25 MG/1
25 TABLET, EXTENDED RELEASE ORAL DAILY
Qty: 30 TABLET | Refills: 11 | Status: SHIPPED | OUTPATIENT
Start: 2022-12-15 | End: 2023-12-15

## 2022-12-15 RX ORDER — AMIODARONE HYDROCHLORIDE 200 MG/1
200 TABLET ORAL DAILY
Qty: 30 TABLET | Refills: 11 | Status: ON HOLD | OUTPATIENT
Start: 2022-12-16 | End: 2023-07-21

## 2022-12-15 RX ORDER — ASPIRIN 81 MG/1
81 TABLET ORAL DAILY
Qty: 30 TABLET | Refills: 11 | Status: ON HOLD | OUTPATIENT
Start: 2022-12-15 | End: 2023-07-21

## 2022-12-15 RX ORDER — VALSARTAN 40 MG/1
40 TABLET ORAL DAILY
Qty: 90 TABLET | Refills: 3 | Status: SHIPPED | OUTPATIENT
Start: 2022-12-15 | End: 2023-12-15

## 2022-12-15 RX ORDER — ATORVASTATIN CALCIUM 40 MG/1
40 TABLET, FILM COATED ORAL DAILY
Qty: 90 TABLET | Refills: 3 | Status: SHIPPED | OUTPATIENT
Start: 2022-12-15 | End: 2023-12-15

## 2022-12-15 RX ADMIN — MELATONIN TAB 3 MG 6 MG: 3 TAB at 01:12

## 2022-12-15 RX ADMIN — LACTULOSE 20 G: 10 SOLUTION ORAL at 08:12

## 2022-12-15 RX ADMIN — MUPIROCIN: 20 OINTMENT TOPICAL at 09:12

## 2022-12-15 RX ADMIN — ASPIRIN 81 MG: 81 TABLET, COATED ORAL at 08:12

## 2022-12-15 RX ADMIN — POTASSIUM CHLORIDE 20 MEQ: 1500 TABLET, EXTENDED RELEASE ORAL at 08:12

## 2022-12-15 RX ADMIN — AMIODARONE HYDROCHLORIDE 200 MG: 200 TABLET ORAL at 08:12

## 2022-12-15 RX ADMIN — METOPROLOL SUCCINATE 25 MG: 25 TABLET, EXTENDED RELEASE ORAL at 08:12

## 2022-12-15 RX ADMIN — APIXABAN 5 MG: 5 TABLET, FILM COATED ORAL at 08:12

## 2022-12-15 RX ADMIN — MAGNESIUM OXIDE TAB 400 MG (241.3 MG ELEMENTAL MG) 400 MG: 400 (241.3 MG) TAB at 08:12

## 2022-12-15 RX ADMIN — DOCUSATE SODIUM 100 MG: 100 CAPSULE, LIQUID FILLED ORAL at 08:12

## 2022-12-15 RX ADMIN — FOLIC ACID 1 MG: 1 TABLET ORAL at 08:12

## 2022-12-15 RX ADMIN — LOSARTAN POTASSIUM 25 MG: 25 TABLET, FILM COATED ORAL at 08:12

## 2022-12-15 RX ADMIN — POLYETHYLENE GLYCOL 3350 17 G: 17 POWDER, FOR SOLUTION ORAL at 08:12

## 2022-12-15 RX ADMIN — FAMOTIDINE 20 MG: 10 INJECTION, SOLUTION INTRAVENOUS at 08:12

## 2022-12-15 NOTE — PLAN OF CARE
Per NP, patient will discharge with home health today. Spoke to daughter per phone. Patient has used [facility] in the past and would like to used them again. FOC obtained. Referral sent via [facility]. Family will transport.

## 2022-12-15 NOTE — PLAN OF CARE
12/15/22 1415   Final Note   Assessment Type Final Discharge Note   Anticipated Discharge Disposition Home-Health   Hospital Resources/Appts/Education Provided Post-Acute resouces added to AVS;Appointments scheduled and added to AVS   Post-Acute Status   Post-Acute Authorization Home Health

## 2022-12-15 NOTE — DISCHARGE SUMMARY
[facility] - 6th Floor Medical Telemetry  Cardiology  Discharge Summary      Patient Name: Suresh Aragon  MRN: [id number]  Admission Date: 12/1/2022  Hospital Length of Stay: 10 days  Discharge Date and Time: 12/15/2022   Attending Physician: Yair Benson MD  Discharging Provider: Jayy Jimenez MD  Primary Care Physician: Primary Doctor Zulema    Brief HPI: This is a 77-year-old male, who is known to Dr. Martinez, with a history of HTN, HLD, GERD, angina.  Patient presents to the ER on 12/01/2022 with complaints of chest pain.  Of note patient was just seen in clinic by Dr. Martinez where he was advised to present to ER if had chest pain secondary to unstable angina prior to his scheduled outpatient LHC.  Patient stated the pain started approximately 7 days prior.  He stated the pain feels like a pressure and radiates down to his left arm.  He said the pain is worse with exertion and improves with rest.  He also had associated shortness of breath with exertion.  Initial troponin was negative however repeat troponins are now mildly elevated.  Initial EKG revealed normal sinus rhythm with a left axis deviation and LVH.  [facility] has admitted the patient for unstable angina.     Hospital Course:   12.3.22: NAD noted. VSS. SR on tele. Denies CP/SOB/Palps.  Right groin benign.  12.4.22: NAD noted. VSS. SR on tele. Denies CP/SOB/Palps. Awaiting CABG.  12.5.22: NAD noted. VSS. SR on tele. NPO for CABG today.  12.6.22:  Upon rounds this a.m. patient was in junctional rhythm with heart rate in the 90s.  Patient then stated he felt anxious and had sinus arrest with asystole.  Code was activated.  Patient was placed back in bed once patient in bed he recovered spontaneously without CPR or epinephrine.  Patient was placed back on external pacing.  Rate was attempted to be turned down to see underlying rhythm however patient continued to require pacing even pacer is lowest rate.  Upon recovery patient began vomiting and  "complained of feeling flushed.  Vital signs stable, no vasopressors required.  12.7.22: NAD noted. VSS. Paced on tele. VSS. Denies SOB/Palps, +incisional CP.  12.8.22: NAD noted. VSS. Paced on tele. Denies SOB/Palps, +incisional CP. NPO for PPM today.  12.9.22: NAD noted. VSS. SR on tele. Denies SOB/Palps, +incisional CP. CT Dcd.  12.10.22: NAD. VSS. Deneis SOB and Palps. + CP/Incisional. "I am ok."   12.11.22: NAD. VSS. Denies SOB and Palps. + CP/Incisional. "I feeling pretty good."   12.12.22: Patient sitting up in bedside chair. NAD. Crackles noted to right base. Shallow respirations. Mid-sternal and Left CW incision CINDI. Sites benign.   12.13.22: NAD. VSS. Sitting in Bedside Chair. Awaiting Rehab Placement. "I am feeling well." Denies CP, SOB and Palps.   12.14.22: NAD. VSS. Sitting in Bedside Chair. Pending SNF Placement, Rehab Denied by Insurance. "I am ready to get out of here." Denies CP, SOB and Palps.   12.15.22: NAD. "I am ready to go home." Denies CP, SOB and Palps. Sitting in Bedside Chair. With Clothes On.      PMH: HTN, HLD, GERD, Angina, Prostate Cancer  PSH:  Appendectomy, Prostate Surgery, Angiogram, PPM Implant  Social History:  Denies EtOH, tobacco, and illicit drug use  Family History:  Father-CAD, MI, HTN     Previous Cardiac Diagnostics:   Venous US LLE 12/10/22:  No evidence of Deep or superficial vein thrombosis in LLE     Echo limited 12.6.22  Limited echo done by the bedside with physician post cardiac arrest; patient had open heart surgery 12/05/2022; definity used to evaluate LV ejection fraction for pacemaker evaluation.    The visually estimated ejection fraction is 35-40%.    Moderately decreased systolic function     Echo 12.3.22  Technically difficult study with limited visualization.  Definity used.    The estimated ejection fraction is 50%.    Left ventricle is normal in size with low-normal systolic function.    Grade 1 left ventricular diastolic dysfunction.     Carotid US " 12.3.22  The right internal carotid artery demonstrated no hemodynamically significant stenosis   Left internal carotid artery demonstrated less than 50% stenosis.    Bilateral vertebral arteries were patent with antegrade flow     [facility] 12.2.22  1. LEFT MAIN:  MILD CALCIFICATION BUT NO OBSTRUCTIVE DISEASE.  2. LAD:  THE LAD SHOWS HEAVY CALCIFICATION PROXIMALLY.  THERE IS EVIDENCE OF A 95% STENOSIS PROXIMALLY INVOLVING THE 1ST DIAGONAL.  THE 1ST DIAGONAL SHOWS 95% STENOSIS PROXIMALLY.  THE MID LAD SHOWS 90% STENOSIS.  3. LEFT CIRCUMFLEX:  90% PROXIMAL STENOSIS IS NOTED  4. RIGHT CORONARY ARTERY:  THE RIGHT CORONARY ARTERY SHOWS A LONG, VERY DISTAL 90% LESION WHICH COMPROMISES THE FLOW INTO THE PDA     GRAFT ANGIOGRAPHY:  1. LIMA TO THE LEFT SIDE OF THE CHEST WALL: WIDELY PATENT WITH NO DISEASE  2.  MECHE TO THE RIGHT SIDE OF THE CHEST WALL: WIDELY PATENT WITH NO DISEASE    Procedure(s) (LRB):  INSERTION, CARDIAC PACEMAKER (N/A)     Consults:   Consults (From admission, onward)          Status Ordering Provider     Inpatient consult to Social Work/Case Management  Once        Provider:  (Not yet assigned)    Acknowledged TERRI GARCIA A     Inpatient consult to Social Work/Case Management  Once        Provider:  (Not yet assigned)    Acknowledged TERRI GARCIA     Inpatient consult to Social Work/Case Management  Once        Provider:  (Not yet assigned)    Acknowledged KEYSHA ELY     Inpatient consult to Social Work/Case Management  Once        Provider:  (Not yet assigned)    Acknowledged CHIO LAU          Final Active Diagnoses:    Diagnosis Date Noted POA    PRINCIPAL PROBLEM:  Coronary artery disease involving native coronary artery of native heart without angina pectoris [I25.10] 12/15/2022 Yes    Hx of CABG [Z95.1] 12/15/2022 Not Applicable    Pacemaker [Z95.0] 12/15/2022 Yes    Chest pain [R07.9] 12/02/2022 Yes      Problems Resolved During this Admission:    Diagnosis Date Noted Date Resolved  POA    Cardiac arrest [I46.9] 12/15/2022 12/15/2022 No    Unstable angina [I20.0] 12/02/2022 12/15/2022 Yes     Discharged Condition: stable    Review of Systems   Constitutional: Positive for malaise/fatigue.   Cardiovascular:  Negative for chest pain, orthopnea and palpitations.   Respiratory:  Negative for shortness of breath.    All other systems reviewed and are negative.    Physical Exam  Constitutional:       Appearance: Normal appearance.   HENT:      Head: Normocephalic.      Mouth/Throat:      Mouth: Mucous membranes are moist.   Eyes:      Extraocular Movements: Extraocular movements intact.   Cardiovascular:      Rate and Rhythm: Normal rate and regular rhythm.      Pulses: Normal pulses.      Heart sounds: Normal heart sounds.   Pulmonary:      Effort: Pulmonary effort is normal.      Breath sounds: Normal breath sounds.      Comments: On RA  Abdominal:      Palpations: Abdomen is soft.   Musculoskeletal:         General: Normal range of motion.      Cervical back: Neck supple.   Skin:     General: Skin is warm and dry.      Comments: L CW Pacer Site Edges Well Approximated with No Sign of Bleed/Infection; Midline Sternotomy CINDI with Edges Well Approximated, No Sign of Bleed/Infection.    Neurological:      General: No focal deficit present.      Mental Status: He is alert and oriented to person, place, and time. Mental status is at baseline.   Psychiatric:         Mood and Affect: Mood normal.         Behavior: Behavior normal.     Disposition: Home-Health Care [facility]    Follow Up:   Follow-up Information       Vini Rikcs MD Follow up on 1/4/2023.    Specialties: Cardiothoracic Surgery, Cardiology  Why: @9:20  Contact information:  [address] [place] [address]   [address]  [place] [address]  [phone]               Candido Martinez MD Follow up on 12/22/2022.    Specialty: Cardiology  Why: @7:40  Contact information:  [facility] [place] [address]  [phone]               [facility]  Mike Macedo Follow up.    Specialties: Home Health Services, Hospice and Palliative Medicine  Why: The home health will contact you within 24 hours of discharge  Contact information:  [place] [id number] [phone]                           Patient Instructions:      Activity as tolerated   Order Comments: Sternal Precautions and Recommendations per PT/OT     Medications:  Reconciled Home Medications:      Medication List        START taking these medications      amiodarone 200 MG Tab  Commonly known as: PACERONE  Take 1 tablet (200 mg total) by mouth once daily.  Start taking on: December 16, 2022     apixaban 5 mg Tab  Commonly known as: ELIQUIS  Take 1 tablet (5 mg total) by mouth 2 (two) times daily.     aspirin 81 MG EC tablet  Commonly known as: ECOTRIN  Take 1 tablet (81 mg total) by mouth once daily.     atorvastatin 40 MG tablet  Commonly known as: LIPITOR  Take 1 tablet (40 mg total) by mouth once daily.     metoprolol succinate 25 MG 24 hr tablet  Commonly known as: TOPROL-XL  Take 1 tablet (25 mg total) by mouth once daily.     valsartan 40 MG tablet  Commonly known as: DIOVAN  Take 1 tablet (40 mg total) by mouth once daily.            STOP taking these medications      amLODIPine 5 MG tablet  Commonly known as: NORVASC     pantoprazole 40 MG tablet  Commonly known as: PROTONIX     tadalafiL 5 MG tablet  Commonly known as: CIALIS     traMADoL 50 mg tablet  Commonly known as: ULTRAM     traZODone 100 MG tablet  Commonly known as: DESYREL            Impression:  NSTEMI Type I  MVCAD/CABG x 3 (LIMA To LAD, SVG to Diag, SVG to RPDA)  Sinus Arrest leading to Cardiac Arrest s/p PPM Implant per [facility] (11.8.22)    - PT was in a Junctional Rhythm which Lead to Sinus Arrest and then Asystole  Newly Afib/Post Operative (Not Captured on EKG)    - CHADsVASc - 4 Points - 4.8% Stroke Risk per Year   SSS/PPM (Placed on 11.8.22)     - [employer] ([facility] Implant - [place])  ICMO/EF  35-40%  HTN  HLD  GERD  LLE Swelling - Preliminary Venous US Unremarkable  No Hx of GI Bleed    Plan:   Continue ASA, Statin, ARB, Amiodarone Load and BB   Continue Eliquis Re CVA Risk Reduction   Ok for D/C Home with Home Health for PT/OT Eval and Treat all Services  F/U with [facility] in 1 Week and CV Surgery as Directed Above    Time spent on the discharge of patient: 52 minutes    Jayy Jimenez MD  Cardiology  [facility] - 6th Floor Medical Telemetry

## 2022-12-15 NOTE — PLAN OF CARE
Discharge documentation sent to [facility] via [facility]. Notified Nataliia.    [facility] approved TCU. Informed NP. No changes, discharge home with [facility].

## 2022-12-15 NOTE — PROGRESS NOTES
12/15/22 1010   Pre Exercise Vitals   /71   Pulse 85   Supplemental O2? No   SpO2 98 %   During Exercise Vitals   Pulse 98   SpO2 95 %   Distance Walked 400 feet   Post Exercise Vitals   /77   Pulse 94   Supplemental O2? No   SpO2 97 %   Modality   Modality Independent      Sternal precautions maintained. Gait steady and strong. Tolerated very well. Encouraged incentive spirometer 1 hour. Communicated with nurse pre and post walk.

## 2022-12-15 NOTE — PT/OT/SLP DISCHARGE
Physical Therapy Discharge Summary    Name: Suresh Aragon  MRN: [id number]   Principal Problem: Unstable angina     Patient Discharged from acute Physical Therapy on 12/15/22.  Please refer to prior PT noted date on 12/15/22 for functional status.     Assessment:     Patient has met all goals and is not appropriate for therapy.    Objective:     GOALS:   Multidisciplinary Problems       Physical Therapy Goals          Problem: Physical Therapy    Goal Priority Disciplines Outcome Goal Variances Interventions   Physical Therapy Goal     PT, PT/OT Ongoing, Progressing     Description: Goals to be met by: 23     Patient will increase functional independence with mobility by performin. Supine to sit with Set-up Dyer  2. Sit to supine with Set-up Dyer  3. Sit to stand transfer with Supervision  4. Bed to chair transfer with Supervision using Rolling Walker  5. Gait  x 350 feet with Supervision using Rolling Walker.                          Reasons for Discontinuation of Therapy Services  Satisfactory goal achievement.      Plan:     Patient Discharged to: Home with Home Health Service.      12/15/2022

## 2022-12-15 NOTE — PLAN OF CARE
Problem: Adult Inpatient Plan of Care  Goal: Plan of Care Review  Outcome: Met  Goal: Patient-Specific Goal (Individualized)  Outcome: Met  Goal: Absence of Hospital-Acquired Illness or Injury  Outcome: Met  Goal: Optimal Comfort and Wellbeing  Outcome: Met  Goal: Readiness for Transition of Care  Outcome: Met     Problem: Fall Injury Risk  Goal: Absence of Fall and Fall-Related Injury  Outcome: Met     Problem: Infection  Goal: Absence of Infection Signs and Symptoms  Outcome: Met     Problem: Communication Impairment (Mechanical Ventilation, Invasive)  Goal: Effective Communication  Outcome: Met     Problem: Device-Related Complication Risk (Mechanical Ventilation, Invasive)  Goal: Optimal Device Function  Outcome: Met     Problem: Inability to Wean (Mechanical Ventilation, Invasive)  Goal: Mechanical Ventilation Liberation  Outcome: Met     Problem: Nutrition Impairment (Mechanical Ventilation, Invasive)  Goal: Optimal Nutrition Delivery  Outcome: Met     Problem: Skin and Tissue Injury (Mechanical Ventilation, Invasive)  Goal: Absence of Device-Related Skin and Tissue Injury  Outcome: Met     Problem: Ventilator-Induced Lung Injury (Mechanical Ventilation, Invasive)  Goal: Absence of Ventilator-Induced Lung Injury  Outcome: Met     Problem: Skin Injury Risk Increased  Goal: Skin Health and Integrity  Outcome: Met

## 2022-12-15 NOTE — PT/OT/SLP PROGRESS
Occupational Therapy   Treatment    Name: Suresh Aragon  MRN: [id number]  Admitting Diagnosis: Unstable angina CAD s/p CABG x 3 12/5, s/p pacemaker 12/8  Recent Surgery: Procedure(s) (LRB):  INSERTION, CARDIAC PACEMAKER (N/A) 7 Days Post-Op    Recommendations:     Discharge Recommendations: home with HH  Discharge Equipment Recommendations: none  Barriers to discharge: none    Assessment:     Suresh Aragon is a 77 y.o. male with a medical diagnosis of Coronary artery disease involving native coronary artery of native heart without angina pectoris. Pt presents with c/o slight pain at incision sites on LLE. Performance deficits affecting function are weakness, impaired endurance, impaired self care skills, impaired functional mobility, other (comment) (sternal precautions).     Rehab Prognosis: Good; patient would benefit from acute skilled OT services to address these deficits and reach maximum level of function.       Plan:     Patient to be seen daily to address the above listed problems via self-care/home management, therapeutic activities, therapeutic exercises  Plan of Care Expires: 12/23/22  Plan of Care Reviewed with: patient    Subjective     Pain/Comfort:  Pain Rating 1: 4/10  Location - Side 1: Left  Location 1: leg   Pt c/o pain at incision sites on LLE, rating pain 4/10.    Objective:     Communicated with: RN prior to session. Patient found up in chair with telemetry upon OT entry to room.    General Precautions: Standard, fall, sternal pxns 2/2 CABG x3, pacemaker pxns  Braces: N/A  Respiratory Status: Room air     Occupational Performance:      Functional Mobility/Transfers:  Patient completed Sit <> Stand Transfer independently with no AD.  Pt performed long distance gait trial in hallway with SBA provided for safety and with no AD.    Activities of Daily Living:  UB dressing: Pt donned hospital gown 3x to simulate button-down shirt with SBA and vcs provided to adhere to pacemaker pxns.   Feeding: Setup  A provided.    Treatment & Education:  OT provided re-education on sternal pxns (move in the tube) and pacemaker pxns during session in order to increase pt's safety and independence with ADL/IADLs.  OT also provided education on how to don/doff L sling (to utilize while sleeping) in order to increase pt's safety and adherence to pacemaker pxns. Pt verbalized and demonstrated good understanding with vcs provided during session.    Patient left up in chair with all lines intact, call button in reach, and RN notified.    GOALS:   Multidisciplinary Problems       Occupational Therapy Goals          Problem: Occupational Therapy    Goal Priority Disciplines Outcome Interventions   Occupational Therapy Goal     OT, PT/OT Ongoing, Progressing    Description: Pt will complete UE dressing mod I  Pt will complete LE dressing mod I   Pt will complete functional transfers mod I  Pt will demonstrate competency sternal precautions for all ADL tasks and mobility mod I.  Pt will complete toileting mod I                         Time Tracking:     OT Date of Treatment: 12/15/22  OT Start Time: 1134  OT Stop Time: 1159  OT Total Time (min): 25 min    Billable Minutes: Self Care/Home Management 25 mins    OT/CINDI: OT     CINDI Visit Number: 5    12/15/2022

## 2022-12-15 NOTE — PT/OT/SLP PROGRESS
Physical Therapy         Treatment        Suresh Aragon   MRN: [id number]     PT Received On: 12/15/22  PT Start Time: 1105     PT Stop Time: 1113    PT Total Time (min): 8 min       Billable Minutes:  Gait Training1  Total Minutes: 8    Treatment Type: Treatment  PT/PTA: PT     PTA Visit Number: 4       General Precautions: Standard, fall, sternal  Orthopedic Precautions: Orthopedic Precautions : N/A   Braces: Braces: N/A    Spiritual, Cultural Beliefs, Roman Catholic Practices, Values that Affect Care: no    Subjective:  Communicated with NSG prior to session.    Pain/Comfort  Pain Rating 1: 0/10    Objective:  Patient found sitting UI, with: telemetry    Functional Mobility:    Transfer Training:  Sit to stand:Independent with No Assistive Device     Gait Training:   Pt ambulated 400 feet wtihout use of an Ad; steady, step-through pattern, no overt LOB or safety concerns noted       Activity Tolerance:  Patient tolerated treatment well    Patient left up in chair with all lines intact and call button in reach.    Assessment:  Suresh Aragon is a 77 y.o. male with a medical diagnosis of Unstable angina s/p CABG and PM    Pt able to recall all pxns; demonstrated and verbalized understanding. All questions/concerns addressed and answered as appropriate.     Rehab potential is excellent.    Activity tolerance: Excellent    Discharge recommendations: Discharge Facility/Level of Care Needs: home, home health PT     Equipment recommendations: Equipment Needed After Discharge: none     GOALS:   Multidisciplinary Problems       Physical Therapy Goals          Problem: Physical Therapy    Goal Priority Disciplines Outcome Goal Variances Interventions   Physical Therapy Goal     PT, PT/OT Ongoing, Progressing     Description: Goals to be met by: 23     Patient will increase functional independence with mobility by performin. Supine to sit with Set-up Breaks  2. Sit to supine with Set-up Breaks  3. Sit to  stand transfer with Supervision  4. Bed to chair transfer with Supervision using Rolling Walker  5. Gait  x 350 feet with Supervision using Rolling Walker.                          PLAN:    Patient to be seen daily  to address the above listed problems via gait training, therapeutic activities, therapeutic exercises  Plan of Care expires: 01/06/23  Plan of Care reviewed with: patient         12/15/2022

## 2022-12-15 NOTE — DISCHARGE INSTRUCTIONS
Repot any signs/symptoms of infection (redness, increasing swelling, drainage of any type from wounds, unresolved pain or fever greater than 100.4 x 24 hours) to home health or physician.

## 2022-12-16 PROCEDURE — G0180 MD CERTIFICATION HHA PATIENT: HCPCS | Mod: ,,, | Performed by: THORACIC SURGERY (CARDIOTHORACIC VASCULAR SURGERY)

## 2022-12-16 PROCEDURE — G0180 PR HOME HEALTH MD CERTIFICATION: ICD-10-PCS | Mod: ,,, | Performed by: THORACIC SURGERY (CARDIOTHORACIC VASCULAR SURGERY)

## 2023-01-04 ENCOUNTER — OFFICE VISIT (OUTPATIENT)
Dept: CARDIAC SURGERY | Facility: CLINIC | Age: 78
End: 2023-01-04
Payer: MEDICARE

## 2023-01-04 VITALS
BODY MASS INDEX: 23.24 KG/M2 | HEART RATE: 59 BPM | SYSTOLIC BLOOD PRESSURE: 134 MMHG | DIASTOLIC BLOOD PRESSURE: 74 MMHG | WEIGHT: 162 LBS

## 2023-01-04 DIAGNOSIS — I25.10 CORONARY ARTERY DISEASE INVOLVING NATIVE CORONARY ARTERY OF NATIVE HEART WITHOUT ANGINA PECTORIS: Primary | ICD-10-CM

## 2023-01-04 PROCEDURE — 99024 PR POST-OP FOLLOW-UP VISIT: ICD-10-PCS | Mod: ,,, | Performed by: THORACIC SURGERY (CARDIOTHORACIC VASCULAR SURGERY)

## 2023-01-04 PROCEDURE — 99024 POSTOP FOLLOW-UP VISIT: CPT | Mod: ,,, | Performed by: THORACIC SURGERY (CARDIOTHORACIC VASCULAR SURGERY)

## 2023-01-04 RX ORDER — FAMOTIDINE 20 MG/1
20 TABLET, FILM COATED ORAL 2 TIMES DAILY
Status: ON HOLD | COMMUNITY
Start: 2022-08-22 | End: 2023-07-21

## 2023-01-04 NOTE — PROGRESS NOTES
Suresh Aragon is a 77 y.o. male patient.   No diagnosis found.  History reviewed. No pertinent past medical history.  No past surgical history pertinent negatives on file.  Scheduled Meds:  Continuous Infusions:  PRN Meds:    Review of patient's allergies indicates:   Allergen Reactions    Diphenhydramine      There are no hospital problems to display for this patient.    Blood pressure 134/74, pulse (!) 59, weight 73.5 kg (162 lb).    Subjective:  The patient returns to the clinic status post coronary artery bypass grafting and pacemaker placement.  He has been doing well.  No pain.      Objective:  Wounds are clean sternum is stable.      Assessment & Plan:  The patient is doing very well status post coronary artery bypass grafting.  Recommend cardiac rehab      Vini Ricks MD  1/4/2023

## 2023-01-09 ENCOUNTER — EXTERNAL HOME HEALTH (OUTPATIENT)
Dept: HOME HEALTH SERVICES | Facility: HOSPITAL | Age: 78
End: 2023-01-09
Payer: MEDICARE

## 2023-01-24 ENCOUNTER — DOCUMENT SCAN (OUTPATIENT)
Dept: HOME HEALTH SERVICES | Facility: HOSPITAL | Age: 78
End: 2023-01-24
Payer: MEDICARE

## 2023-01-27 ENCOUNTER — DOCUMENT SCAN (OUTPATIENT)
Dept: HOME HEALTH SERVICES | Facility: HOSPITAL | Age: 78
End: 2023-01-27
Payer: MEDICARE

## 2023-01-27 PROCEDURE — 99499 NO LOS: ICD-10-PCS | Mod: ,,, | Performed by: THORACIC SURGERY (CARDIOTHORACIC VASCULAR SURGERY)

## 2023-01-27 PROCEDURE — 99499 UNLISTED E&M SERVICE: CPT | Mod: ,,, | Performed by: THORACIC SURGERY (CARDIOTHORACIC VASCULAR SURGERY)

## 2023-02-07 ENCOUNTER — DOCUMENT SCAN (OUTPATIENT)
Dept: HOME HEALTH SERVICES | Facility: HOSPITAL | Age: 78
End: 2023-02-07
Payer: MEDICARE

## 2023-07-21 ENCOUNTER — HOSPITAL ENCOUNTER (OUTPATIENT)
Facility: HOSPITAL | Age: 78
Discharge: HOME | End: 2023-07-22
Attending: INTERNAL MEDICINE | Admitting: INTERNAL MEDICINE
Payer: MEDICARE

## 2023-07-21 DIAGNOSIS — I25.10 CORONARY ATHEROSCLEROSIS OF NATIVE CORONARY ARTERY: ICD-10-CM

## 2023-07-21 DIAGNOSIS — I25.10 CAD (CORONARY ARTERY DISEASE), NATIVE CORONARY ARTERY: Primary | ICD-10-CM

## 2023-07-21 LAB — CATH EF QUANTITATIVE: 45 %

## 2023-07-21 PROCEDURE — C1887 CATHETER, GUIDING: HCPCS | Performed by: INTERNAL MEDICINE

## 2023-07-21 PROCEDURE — 27201423 OPTIME MED/SURG SUP & DEVICES STERILE SUPPLY: Performed by: INTERNAL MEDICINE

## 2023-07-21 PROCEDURE — 27000221 HC OXYGEN, UP TO 24 HOURS

## 2023-07-21 PROCEDURE — C1874 STENT, COATED/COV W/DEL SYS: HCPCS | Performed by: INTERNAL MEDICINE

## 2023-07-21 PROCEDURE — C9602 PERC D-E COR STENT ATHER S: HCPCS | Mod: LC | Performed by: INTERNAL MEDICINE

## 2023-07-21 PROCEDURE — 99152 MOD SED SAME PHYS/QHP 5/>YRS: CPT | Performed by: INTERNAL MEDICINE

## 2023-07-21 PROCEDURE — 92978 ENDOLUMINL IVUS OCT C 1ST: CPT | Mod: LC | Performed by: INTERNAL MEDICINE

## 2023-07-21 PROCEDURE — 93459 L HRT ART/GRFT ANGIO: CPT | Mod: 59 | Performed by: INTERNAL MEDICINE

## 2023-07-21 PROCEDURE — 63600175 PHARM REV CODE 636 W HCPCS: Performed by: INTERNAL MEDICINE

## 2023-07-21 PROCEDURE — 94761 N-INVAS EAR/PLS OXIMETRY MLT: CPT | Mod: 59

## 2023-07-21 PROCEDURE — 93010 EKG 12-LEAD: ICD-10-PCS | Mod: ,,, | Performed by: INTERNAL MEDICINE

## 2023-07-21 PROCEDURE — 25000003 PHARM REV CODE 250: Performed by: INTERNAL MEDICINE

## 2023-07-21 PROCEDURE — C1885 CATH, TRANSLUMIN ANGIO LASER: HCPCS | Performed by: INTERNAL MEDICINE

## 2023-07-21 PROCEDURE — C1725 CATH, TRANSLUMIN NON-LASER: HCPCS | Performed by: INTERNAL MEDICINE

## 2023-07-21 PROCEDURE — 25500020 PHARM REV CODE 255: Performed by: INTERNAL MEDICINE

## 2023-07-21 PROCEDURE — 93005 ELECTROCARDIOGRAM TRACING: CPT

## 2023-07-21 PROCEDURE — C1760 CLOSURE DEV, VASC: HCPCS | Performed by: INTERNAL MEDICINE

## 2023-07-21 PROCEDURE — 99153 MOD SED SAME PHYS/QHP EA: CPT | Performed by: INTERNAL MEDICINE

## 2023-07-21 PROCEDURE — C1753 CATH, INTRAVAS ULTRASOUND: HCPCS | Performed by: INTERNAL MEDICINE

## 2023-07-21 PROCEDURE — 85025 COMPLETE CBC W/AUTO DIFF WBC: CPT | Performed by: INTERNAL MEDICINE

## 2023-07-21 PROCEDURE — C1894 INTRO/SHEATH, NON-LASER: HCPCS | Performed by: INTERNAL MEDICINE

## 2023-07-21 PROCEDURE — 93010 ELECTROCARDIOGRAM REPORT: CPT | Mod: ,,, | Performed by: INTERNAL MEDICINE

## 2023-07-21 PROCEDURE — C1769 GUIDE WIRE: HCPCS | Performed by: INTERNAL MEDICINE

## 2023-07-21 DEVICE — ANGIO-SEAL VIP VASCULAR CLOSURE DEVICE
Type: IMPLANTABLE DEVICE | Site: GROIN | Status: FUNCTIONAL
Brand: ANGIO-SEAL

## 2023-07-21 DEVICE — EVEROLIMUS-ELUTING PLATINUM CHROMIUM CORONARY STENT SYSTEM
Type: IMPLANTABLE DEVICE | Site: CORONARY | Status: FUNCTIONAL
Brand: SYNERGY™ XD

## 2023-07-21 RX ORDER — CLOPIDOGREL BISULFATE 75 MG/1
75 TABLET ORAL DAILY
Status: DISCONTINUED | OUTPATIENT
Start: 2023-07-21 | End: 2023-07-21

## 2023-07-21 RX ORDER — MAG HYDROX/ALUMINUM HYD/SIMETH 200-200-20
SUSPENSION, ORAL (FINAL DOSE FORM) ORAL
Status: DISCONTINUED | OUTPATIENT
Start: 2023-07-21 | End: 2023-07-21 | Stop reason: HOSPADM

## 2023-07-21 RX ORDER — CLOPIDOGREL BISULFATE 75 MG/1
75 TABLET ORAL DAILY
Status: DISCONTINUED | OUTPATIENT
Start: 2023-07-22 | End: 2023-07-22 | Stop reason: HOSPADM

## 2023-07-21 RX ORDER — CEFAZOLIN SODIUM 1 G/3ML
INJECTION, POWDER, FOR SOLUTION INTRAMUSCULAR; INTRAVENOUS
Status: DISCONTINUED | OUTPATIENT
Start: 2023-07-21 | End: 2023-07-21 | Stop reason: HOSPADM

## 2023-07-21 RX ORDER — SODIUM CHLORIDE 9 MG/ML
INJECTION, SOLUTION INTRAVENOUS CONTINUOUS
Status: ACTIVE | OUTPATIENT
Start: 2023-07-21 | End: 2023-07-22

## 2023-07-21 RX ORDER — TRAZODONE HYDROCHLORIDE 50 MG/1
100 TABLET ORAL NIGHTLY
COMMUNITY
Start: 2023-07-19

## 2023-07-21 RX ORDER — HEPARIN SODIUM 1000 [USP'U]/ML
INJECTION, SOLUTION INTRAVENOUS; SUBCUTANEOUS
Status: DISCONTINUED | OUTPATIENT
Start: 2023-07-21 | End: 2023-07-21 | Stop reason: HOSPADM

## 2023-07-21 RX ORDER — ASPIRIN 325 MG
TABLET ORAL
Status: DISCONTINUED | OUTPATIENT
Start: 2023-07-21 | End: 2023-07-21 | Stop reason: HOSPADM

## 2023-07-21 RX ORDER — TADALAFIL 5 MG/1
5 TABLET ORAL DAILY PRN
COMMUNITY
Start: 2023-04-22

## 2023-07-21 RX ORDER — ISOSORBIDE MONONITRATE 30 MG/1
30 TABLET, EXTENDED RELEASE ORAL DAILY PRN
COMMUNITY
Start: 2023-07-17

## 2023-07-21 RX ORDER — NITROGLYCERIN 20 MG/100ML
INJECTION INTRAVENOUS
Status: DISCONTINUED | OUTPATIENT
Start: 2023-07-21 | End: 2023-07-21 | Stop reason: HOSPADM

## 2023-07-21 RX ORDER — LIDOCAINE HYDROCHLORIDE 10 MG/ML
INJECTION, SOLUTION EPIDURAL; INFILTRATION; INTRACAUDAL; PERINEURAL
Status: DISCONTINUED | OUTPATIENT
Start: 2023-07-21 | End: 2023-07-21 | Stop reason: HOSPADM

## 2023-07-21 RX ORDER — FENTANYL CITRATE 50 UG/ML
INJECTION, SOLUTION INTRAMUSCULAR; INTRAVENOUS
Status: DISCONTINUED | OUTPATIENT
Start: 2023-07-21 | End: 2023-07-21 | Stop reason: HOSPADM

## 2023-07-21 RX ORDER — CLOPIDOGREL 300 MG/1
TABLET, FILM COATED ORAL
Status: DISCONTINUED | OUTPATIENT
Start: 2023-07-21 | End: 2023-07-21

## 2023-07-21 RX ORDER — SODIUM CHLORIDE 9 MG/ML
INJECTION, SOLUTION INTRAVENOUS ONCE
Status: ACTIVE | OUTPATIENT
Start: 2023-07-21

## 2023-07-21 RX ORDER — DIAZEPAM 5 MG/1
10 TABLET ORAL
Status: DISPENSED | OUTPATIENT
Start: 2023-07-21

## 2023-07-21 RX ORDER — ASPIRIN 81 MG/1
81 TABLET ORAL DAILY
Status: DISCONTINUED | OUTPATIENT
Start: 2023-07-22 | End: 2023-07-22 | Stop reason: HOSPADM

## 2023-07-21 RX ORDER — MIDAZOLAM HYDROCHLORIDE 1 MG/ML
INJECTION, SOLUTION INTRAMUSCULAR; INTRAVENOUS
Status: DISCONTINUED | OUTPATIENT
Start: 2023-07-21 | End: 2023-07-21 | Stop reason: HOSPADM

## 2023-07-21 RX ADMIN — DIAZEPAM 10 MG: 5 TABLET ORAL at 06:07

## 2023-07-21 NOTE — Clinical Note
The catheter was repositioned into the ostial SVG graft. The catheter was unable to access the area..

## 2023-07-21 NOTE — Clinical Note
The catheter was inserted into the and was inserted over the wire into the ostium   right coronary artery. An angiography was performed of the right coronary arteries. The angiography was performed via power injection.  JUAN A

## 2023-07-21 NOTE — Clinical Note
The catheter was inserted into the, was removed from the and was inserted over the wire into the distal   left main. IVUS performed

## 2023-07-21 NOTE — DISCHARGE INSTRUCTIONS
-Remove dressing in 24 hrs.  -Can shower 24 hrs, use soap and water only.  -No driving for 24 hrs.  -Do not lift anything heavier than a gallon of milk for 5 days.  -Do not tub bathe for 5 days.  -No lotion, powders, creams around site for 5 days.  -Return to the nearest emergency room if you start running a fever; have any kind of discharge coming from the site, the site looks red or swollen.  -If site starts to bleed, lay flat on the ground, apply pressure to the site and call 911.

## 2023-07-21 NOTE — H&P
Patient name: Suresh Aragon  MRN: [id number]  : 1945  Cath Lab Procedure H&P Update    Pre-Procedure Assessment:    I saw and examined the patient face to face. The patient has been re-evaluated and his condition is unchanged. The reason for admission, procedure and care is still present.  Based on the patients H&P, pre-procedure physical exam, relevant diagnostic studies, NPO status and information obtained from the patient, I determine the patient is an appropriate candidate for the proposed procedure and anesthesia planned. I further certify the anesthesia risks, benefits and options have been explained to the patient to which he agrees as documented on the procedural consent.    See scanned updated H&P and additional records from media tab.      Candido Martinez

## 2023-07-21 NOTE — Clinical Note
The catheter was removed from the and was repositioned into the ostial LIMA graft. An angiography was performed of the left coronary arteries. The angiography was performed via power injection.  The catheter was unable to engage the area..

## 2023-07-21 NOTE — Clinical Note
The catheter was inserted into the, was removed from the and was inserted over the wire into the ostial LIMA graft. An angiography was performed of the graft. Multiple views were taken. The angiography was performed via power injection.

## 2023-07-21 NOTE — Clinical Note
The catheter was repositioned into the ostial SVG graft. An angiography was performed of the graft. The angiography was performed via power injection.  SVG-Diag

## 2023-07-22 VITALS
BODY MASS INDEX: 23.8 KG/M2 | DIASTOLIC BLOOD PRESSURE: 78 MMHG | HEIGHT: 70 IN | SYSTOLIC BLOOD PRESSURE: 143 MMHG | HEART RATE: 76 BPM | OXYGEN SATURATION: 96 % | TEMPERATURE: 99 F | RESPIRATION RATE: 18 BRPM | WEIGHT: 166.25 LBS

## 2023-07-22 LAB
BASOPHILS # BLD AUTO: 0.08 X10(3)/MCL
BASOPHILS NFR BLD AUTO: 1 %
EOSINOPHIL # BLD AUTO: 0.2 X10(3)/MCL (ref 0–0.9)
EOSINOPHIL NFR BLD AUTO: 2.4 %
ERYTHROCYTE [DISTWIDTH] IN BLOOD BY AUTOMATED COUNT: 12.6 % (ref 11.5–17)
HCT VFR BLD AUTO: 40.5 % (ref 42–52)
HGB BLD-MCNC: 13.7 G/DL (ref 14–18)
IMM GRANULOCYTES # BLD AUTO: 0.02 X10(3)/MCL (ref 0–0.04)
IMM GRANULOCYTES NFR BLD AUTO: 0.2 %
LYMPHOCYTES # BLD AUTO: 2.7 X10(3)/MCL (ref 0.6–4.6)
LYMPHOCYTES NFR BLD AUTO: 32.1 %
MCH RBC QN AUTO: 33.1 PG (ref 27–31)
MCHC RBC AUTO-ENTMCNC: 33.8 G/DL (ref 33–36)
MCV RBC AUTO: 97.8 FL (ref 80–94)
MONOCYTES # BLD AUTO: 1.01 X10(3)/MCL (ref 0.1–1.3)
MONOCYTES NFR BLD AUTO: 12 %
NEUTROPHILS # BLD AUTO: 4.41 X10(3)/MCL (ref 2.1–9.2)
NEUTROPHILS NFR BLD AUTO: 52.3 %
NRBC BLD AUTO-RTO: 0 %
PLATELET # BLD AUTO: 156 X10(3)/MCL (ref 130–400)
PMV BLD AUTO: 9.5 FL (ref 7.4–10.4)
RBC # BLD AUTO: 4.14 X10(6)/MCL (ref 4.7–6.1)
WBC # SPEC AUTO: 8.42 X10(3)/MCL (ref 4.5–11.5)

## 2023-07-22 PROCEDURE — 93010 ELECTROCARDIOGRAM REPORT: CPT | Mod: ,,, | Performed by: INTERNAL MEDICINE

## 2023-07-22 PROCEDURE — 25000003 PHARM REV CODE 250: Performed by: INTERNAL MEDICINE

## 2023-07-22 PROCEDURE — 94761 N-INVAS EAR/PLS OXIMETRY MLT: CPT

## 2023-07-22 PROCEDURE — 93010 EKG 12-LEAD: ICD-10-PCS | Mod: ,,, | Performed by: INTERNAL MEDICINE

## 2023-07-22 PROCEDURE — 93005 ELECTROCARDIOGRAM TRACING: CPT

## 2023-07-22 PROCEDURE — 25000003 PHARM REV CODE 250: Performed by: NURSE PRACTITIONER

## 2023-07-22 RX ORDER — CLOPIDOGREL BISULFATE 75 MG/1
75 TABLET ORAL DAILY
Qty: 30 TABLET | Refills: 11 | Status: SHIPPED | OUTPATIENT
Start: 2023-07-22 | End: 2024-07-21

## 2023-07-22 RX ORDER — PANTOPRAZOLE SODIUM 40 MG/1
40 TABLET, DELAYED RELEASE ORAL DAILY
Status: DISCONTINUED | OUTPATIENT
Start: 2023-07-22 | End: 2023-07-22 | Stop reason: HOSPADM

## 2023-07-22 RX ORDER — ASPIRIN 81 MG/1
81 TABLET ORAL DAILY
Qty: 30 TABLET | Refills: 0 | Status: ON HOLD | OUTPATIENT
Start: 2023-07-22 | End: 2023-09-26

## 2023-07-22 RX ORDER — PANTOPRAZOLE SODIUM 40 MG/1
40 TABLET, DELAYED RELEASE ORAL DAILY
Qty: 30 TABLET | Refills: 11 | Status: ON HOLD | OUTPATIENT
Start: 2023-07-22 | End: 2023-09-26

## 2023-07-22 RX ADMIN — PANTOPRAZOLE SODIUM 40 MG: 40 TABLET, DELAYED RELEASE ORAL at 08:07

## 2023-07-22 RX ADMIN — ASPIRIN 81 MG: 81 TABLET, COATED ORAL at 08:07

## 2023-07-22 RX ADMIN — CLOPIDOGREL BISULFATE 75 MG: 75 TABLET ORAL at 08:07

## 2023-07-22 NOTE — NURSING
Nurses Note -- 4 Eyes      7/21/2023   7:22 PM      Skin assessed during: Admit      [x] No Altered Skin Integrity Present    []Prevention Measures Documented      [] Yes- Altered Skin Integrity Present or Discovered   [] LDA Added if Not in Epic (Describe Wound)   [] New Altered Skin Integrity was Present on Admit and Documented in LDA   [] Wound Image Taken    Wound Care Consulted? No    Attending Nurse:  Nila Rowley RN     Second RN/Staff Member: Denise Medina

## 2023-07-22 NOTE — DISCHARGE SUMMARY
[facility] - Observation Unit  Cardiology  Discharge Summary      Patient Name: Suresh Aragon  MRN: [id number]  Admission Date: 7/21/2023  Hospital Length of Stay: 0 days  Discharge Date and Time:  07/22/2023 9:35 AM  Attending Physician: Candido Martinez MD    Discharging Provider: DONOVAN Hassan  Primary Care Physician: Primary Doctor Zulema    HPI:   Mr. Aragon presented for scheduled coronary angiogram. Procedure as below. Right groin site benign. Refuses to have labs drawn. He is upset that he hasn't seen Dr. Martinez this admit.     Procedure(s) (LRB):  Left heart cath (Left)   Coronary angiography:  Native circulation:  1. Left main:  Shows disease of approximately 20-30%.  Long, large vessel.  No obstructive disease  2. Left anterior descending coronary artery:  Shows severe disease of 95% proximally and mid.  Competitive flow was seen from the LIMA  3. Left circumflex:  The left circumflex was not bypassed.  It shows disease of 95% proximally  4. Right coronary artery:  No competitive flow from the RCA graft.  There is a 90% distal lesion.  The vessel becomes relatively small at this point.     Graft angiography:  1. Lima to the LAD: Shows good flow with no significant disease  2. SVG to diagonal:  Shows good flow with no significant disease.  3. SVG to RCA is 100% occluded very proximally.     Description of intervention::    Successful percutaneous coronary intervention to the proximal left circumflex, involving the following procedures:  3.1.  Balloon angioplasty with 1.5 mm and 2.0 mm balloons  3.2.  Laser atherectomy with a 0.9 mm coronary laser catheter.  Settings at 45/40.  Three passes through the proximal and mid circumflex.  3.3.  Placement of a 2.25 by 20 mm drug-eluting stent.  3.4.  Intravascular ultrasound was performed as well.     Intravascular ultrasound showed the left main to be intact with no evidence of perforation or dissection.  There was disease of approximately  20-30% but no obstructive disease of the bifurcation.     Recommendations:     One.  Continue aspirin.  Start Plavix 75 mg daily.  Continue beta blockers, ACE inhibitors and statins.     2. Discharge in the morning with follow-up with me in 1-2 weeks.       3. Of note, this patient may need to be brought back to the cath lab in approximately 6 weeks for intervention to the distal RCA and posterolateral branch to improve the flow to the inferior wall.      Physical Exam   HENT:   Mouth/Throat: Mucous membranes are moist.   Cardiovascular: Normal heart sounds and normal pulses. Pulmonary:      Effort: Pulmonary effort is normal.      Breath sounds: Normal breath sounds.     Musculoskeletal:         General: Normal range of motion.   Neurological: He is alert.   Skin: Skin is warm. Capillary refill takes less than 2 seconds.   Psychiatric: Mood normal.        Significant Diagnostic Studies: Labs:   BMP: No results for input(s): GLU, NA, K, CL, CO2, BUN, CREATININE, CALCIUM, MG in the last 48 hours. and CBC   Recent Labs   Lab 07/21/23  2341   WBC 8.42   HGB 13.7*   HCT 40.5*          Native CAD  HTN  HLD  GERD    Plan:  Continue aspirin, plavix and statin  Resume eliquis 5mg daily  Continue metoprolol and valsartan.  Start protonix 40 mg daily for PUD prophylaxis    Discharged Condition: stable    Disposition: Home or Self Care    Follow Up:   Follow-up Information       Candido Martinez MD Follow up.    Specialty: Cardiology  Why: Follow up with Dr Martinez on 7/27/23 @ 10:40am  Contact information:  [address] [place] [id number] [phone]                           Patient Instructions:      Diet Cardiac     Lifting restrictions   Order Comments: No lifting > 10#s x 1 week     No driving until:   Order Comments: May resume driving in 2 days     Notify your health care provider if you experience any of the following:  temperature >100.4     Notify your health care provider if you experience any of the  following:  redness, tenderness, or signs of infection (pain, swelling, redness, odor or green/yellow discharge around incision site)     Notify your health care provider if you experience any of the following:  severe uncontrolled pain     No dressing needed   Order Comments: Wash site daily with soap and water     Shower on day dressing removed (No bath)   Order Comments: Showers only x 1     Medications:  Reconciled Home Medications:      Medication List        START taking these medications      aspirin 81 MG EC tablet  Commonly known as: ECOTRIN  Take 1 tablet (81 mg total) by mouth once daily.     clopidogreL 75 mg tablet  Commonly known as: PLAVIX  Take 1 tablet (75 mg total) by mouth once daily.     pantoprazole 40 MG tablet  Commonly known as: PROTONIX  Take 1 tablet (40 mg total) by mouth once daily.            CONTINUE taking these medications      apixaban 5 mg Tab  Commonly known as: ELIQUIS  Take 1 tablet (5 mg total) by mouth 2 (two) times daily.     atorvastatin 40 MG tablet  Commonly known as: LIPITOR  Take 1 tablet (40 mg total) by mouth once daily.     isosorbide mononitrate 30 MG 24 hr tablet  Commonly known as: IMDUR  Take 30 mg by mouth daily as needed. Patient states he takes 1/2tab as needed     metoprolol succinate 25 MG 24 hr tablet  Commonly known as: TOPROL-XL  Take 1 tablet (25 mg total) by mouth once daily.     tadalafiL 5 MG tablet  Commonly known as: CIALIS  Take 5 mg by mouth daily as needed.     traZODone 50 MG tablet  Commonly known as: DESYREL  Take 50 mg by mouth nightly as needed.     valsartan 40 MG tablet  Commonly known as: DIOVAN  Take 1 tablet (40 mg total) by mouth once daily.              Time spent on the discharge of patient: 35 minutes    DONOVAN Hassna  Cardiology  [facility]ation [facility]    Physician addendum:  I have seen and examined this patient as a split-shared visit with the LO d/t complicated medical management of above problems  written in assessment and high acuity requiring physician expertise in medical decision-making. I performed the substantive portion of the history and exam. Above medical decision-making is also formulated by me.      Plan:   Medications reviewed. Discharge to home. Answered all questions prior to discharge.   F/U scheduled as above.     Graeme Diaz MD  Cardiologist

## 2023-09-26 ENCOUNTER — HOSPITAL ENCOUNTER (OUTPATIENT)
Facility: HOSPITAL | Age: 78
Discharge: HOME | End: 2023-09-26
Attending: INTERNAL MEDICINE | Admitting: INTERNAL MEDICINE
Payer: MEDICARE

## 2023-09-26 VITALS
OXYGEN SATURATION: 98 % | TEMPERATURE: 98 F | WEIGHT: 171.06 LBS | SYSTOLIC BLOOD PRESSURE: 137 MMHG | HEIGHT: 70 IN | RESPIRATION RATE: 11 BRPM | DIASTOLIC BLOOD PRESSURE: 76 MMHG | HEART RATE: 61 BPM | BODY MASS INDEX: 24.49 KG/M2

## 2023-09-26 DIAGNOSIS — I25.10 CAD (CORONARY ARTERY DISEASE): ICD-10-CM

## 2023-09-26 DIAGNOSIS — I25.10 CORONARY ATHEROSCLEROSIS OF NATIVE CORONARY ARTERY: ICD-10-CM

## 2023-09-26 PROCEDURE — C1769 GUIDE WIRE: HCPCS | Performed by: INTERNAL MEDICINE

## 2023-09-26 PROCEDURE — C1725 CATH, TRANSLUMIN NON-LASER: HCPCS | Performed by: INTERNAL MEDICINE

## 2023-09-26 PROCEDURE — C9601 PERC DRUG-EL COR STENT BRAN: HCPCS | Mod: RC | Performed by: INTERNAL MEDICINE

## 2023-09-26 PROCEDURE — 63600175 PHARM REV CODE 636 W HCPCS: Performed by: INTERNAL MEDICINE

## 2023-09-26 PROCEDURE — 99152 MOD SED SAME PHYS/QHP 5/>YRS: CPT | Performed by: INTERNAL MEDICINE

## 2023-09-26 PROCEDURE — 99153 MOD SED SAME PHYS/QHP EA: CPT | Performed by: INTERNAL MEDICINE

## 2023-09-26 PROCEDURE — 25000003 PHARM REV CODE 250: Performed by: INTERNAL MEDICINE

## 2023-09-26 PROCEDURE — C9600 PERC DRUG-EL COR STENT SING: HCPCS | Mod: RC | Performed by: INTERNAL MEDICINE

## 2023-09-26 PROCEDURE — C1760 CLOSURE DEV, VASC: HCPCS | Performed by: INTERNAL MEDICINE

## 2023-09-26 PROCEDURE — 25500020 PHARM REV CODE 255: Performed by: INTERNAL MEDICINE

## 2023-09-26 PROCEDURE — C1874 STENT, COATED/COV W/DEL SYS: HCPCS | Performed by: INTERNAL MEDICINE

## 2023-09-26 PROCEDURE — 93005 ELECTROCARDIOGRAM TRACING: CPT | Mod: 59

## 2023-09-26 PROCEDURE — 85347 COAGULATION TIME ACTIVATED: CPT | Performed by: INTERNAL MEDICINE

## 2023-09-26 PROCEDURE — C1887 CATHETER, GUIDING: HCPCS | Performed by: INTERNAL MEDICINE

## 2023-09-26 PROCEDURE — C1894 INTRO/SHEATH, NON-LASER: HCPCS | Performed by: INTERNAL MEDICINE

## 2023-09-26 DEVICE — EVEROLIMUS-ELUTING PLATINUM CHROMIUM CORONARY STENT SYSTEM
Type: IMPLANTABLE DEVICE | Site: CORONARY | Status: FUNCTIONAL
Brand: SYNERGY™ XD

## 2023-09-26 DEVICE — ANGIO-SEAL VIP VASCULAR CLOSURE DEVICE
Type: IMPLANTABLE DEVICE | Site: CORONARY | Status: FUNCTIONAL
Brand: ANGIO-SEAL

## 2023-09-26 RX ORDER — CEFAZOLIN SODIUM 1 G/3ML
INJECTION, POWDER, FOR SOLUTION INTRAMUSCULAR; INTRAVENOUS
Status: DISCONTINUED | OUTPATIENT
Start: 2023-09-26 | End: 2023-09-27 | Stop reason: HOSPADM

## 2023-09-26 RX ORDER — NAPROXEN SODIUM 220 MG/1
TABLET, FILM COATED ORAL
Status: DISCONTINUED | OUTPATIENT
Start: 2023-09-26 | End: 2023-09-27 | Stop reason: HOSPADM

## 2023-09-26 RX ORDER — DIAZEPAM 5 MG/1
10 TABLET ORAL
Status: DISCONTINUED | OUTPATIENT
Start: 2023-09-26 | End: 2023-09-27 | Stop reason: HOSPADM

## 2023-09-26 RX ORDER — CLOPIDOGREL 300 MG/1
TABLET, FILM COATED ORAL
Status: DISCONTINUED | OUTPATIENT
Start: 2023-09-26 | End: 2023-09-27 | Stop reason: HOSPADM

## 2023-09-26 RX ORDER — SODIUM CHLORIDE 9 MG/ML
INJECTION, SOLUTION INTRAVENOUS ONCE
Status: COMPLETED | OUTPATIENT
Start: 2023-09-26 | End: 2023-09-26

## 2023-09-26 RX ORDER — ONDANSETRON 4 MG/1
8 TABLET, ORALLY DISINTEGRATING ORAL EVERY 8 HOURS PRN
Status: DISCONTINUED | OUTPATIENT
Start: 2023-09-26 | End: 2023-09-27 | Stop reason: HOSPADM

## 2023-09-26 RX ORDER — MAG HYDROX/ALUMINUM HYD/SIMETH 200-200-20
SUSPENSION, ORAL (FINAL DOSE FORM) ORAL
Status: DISCONTINUED | OUTPATIENT
Start: 2023-09-26 | End: 2023-09-27 | Stop reason: HOSPADM

## 2023-09-26 RX ORDER — HYDROCODONE BITARTRATE AND ACETAMINOPHEN 5; 325 MG/1; MG/1
1 TABLET ORAL EVERY 4 HOURS PRN
Status: DISCONTINUED | OUTPATIENT
Start: 2023-09-26 | End: 2023-09-27 | Stop reason: HOSPADM

## 2023-09-26 RX ORDER — SODIUM CHLORIDE 9 MG/ML
INJECTION, SOLUTION INTRAVENOUS CONTINUOUS
Status: DISCONTINUED | OUTPATIENT
Start: 2023-09-26 | End: 2023-09-26 | Stop reason: HOSPADM

## 2023-09-26 RX ORDER — LIDOCAINE HYDROCHLORIDE 10 MG/ML
INJECTION INFILTRATION; PERINEURAL
Status: DISCONTINUED | OUTPATIENT
Start: 2023-09-26 | End: 2023-09-27 | Stop reason: HOSPADM

## 2023-09-26 RX ORDER — MIDAZOLAM HYDROCHLORIDE 1 MG/ML
INJECTION INTRAMUSCULAR; INTRAVENOUS
Status: DISCONTINUED | OUTPATIENT
Start: 2023-09-26 | End: 2023-09-27 | Stop reason: HOSPADM

## 2023-09-26 RX ORDER — TRAMADOL HYDROCHLORIDE 100 MG/1
50 TABLET, EXTENDED RELEASE ORAL DAILY PRN
COMMUNITY
Start: 2023-09-07

## 2023-09-26 RX ORDER — ACETAMINOPHEN 325 MG/1
650 TABLET ORAL EVERY 4 HOURS PRN
Status: DISCONTINUED | OUTPATIENT
Start: 2023-09-26 | End: 2023-09-27 | Stop reason: HOSPADM

## 2023-09-26 RX ORDER — NITROGLYCERIN 20 MG/100ML
INJECTION INTRAVENOUS
Status: DISCONTINUED | OUTPATIENT
Start: 2023-09-26 | End: 2023-09-27 | Stop reason: HOSPADM

## 2023-09-26 RX ORDER — MORPHINE SULFATE 4 MG/ML
2 INJECTION, SOLUTION INTRAMUSCULAR; INTRAVENOUS EVERY 4 HOURS PRN
Status: DISCONTINUED | OUTPATIENT
Start: 2023-09-26 | End: 2023-09-27 | Stop reason: HOSPADM

## 2023-09-26 RX ORDER — FAMOTIDINE 20 MG/1
20 TABLET, FILM COATED ORAL DAILY
COMMUNITY
Start: 2023-08-08

## 2023-09-26 RX ORDER — HYDRALAZINE HYDROCHLORIDE 20 MG/ML
10 INJECTION INTRAMUSCULAR; INTRAVENOUS EVERY 4 HOURS PRN
Status: DISCONTINUED | OUTPATIENT
Start: 2023-09-26 | End: 2023-09-27 | Stop reason: HOSPADM

## 2023-09-26 RX ORDER — FENTANYL CITRATE 50 UG/ML
INJECTION, SOLUTION INTRAMUSCULAR; INTRAVENOUS
Status: DISCONTINUED | OUTPATIENT
Start: 2023-09-26 | End: 2023-09-27 | Stop reason: HOSPADM

## 2023-09-26 RX ORDER — HEPARIN SODIUM 1000 [USP'U]/ML
INJECTION, SOLUTION INTRAVENOUS; SUBCUTANEOUS
Status: DISCONTINUED | OUTPATIENT
Start: 2023-09-26 | End: 2023-09-27 | Stop reason: HOSPADM

## 2023-09-26 RX ADMIN — SODIUM CHLORIDE: 9 INJECTION, SOLUTION INTRAVENOUS at 06:09

## 2023-09-26 RX ADMIN — DIAZEPAM 10 MG: 5 TABLET ORAL at 06:09

## 2023-09-26 NOTE — H&P
Patient name: Suresh Aragon  MRN: [id number]  : 1945  Cath Lab Procedure H&P Update    Pre-Procedure Assessment:    I saw and examined the patient face to face. The patient has been re-evaluated and his condition is unchanged. The reason for admission, procedure and care is still present.  Based on the patients H&P, pre-procedure physical exam, relevant diagnostic studies, NPO status and information obtained from the patient, I determine the patient is an appropriate candidate for the proposed procedure and anesthesia planned. I further certify the anesthesia risks, benefits and options have been explained to the patient to which he agrees as documented on the procedural consent.    See scanned updated H&P and additional records from media tab.      Candido Martinez

## 2023-09-26 NOTE — DISCHARGE INSTRUCTIONS
-Remove dressing in 24hrs  -No driving for two Days  -Do not lift anything heavier than a gallon of milk for 5 days  -No tub bathing for 5 days (if you have a groin site).   -No lotions, powders, creams around site for 5 days  - Return to the nearest emergency room if you start running a fever; have any kind of discharge coming from the site, the site looks red or swollen.  - If site starts to bleed, lay flat on the ground, apply pressure to the site and call 911.   Resume Eliquis tomorrow.

## 2023-09-27 NOTE — NURSING
Prescription for 81mg Aspirin PO once daily for 30 days was sent to patient's pharmacy. I called and spoke with patient and instructed him to take a baby aspirin for 4 weeks and then to stop taking it. Per. Dr. Martinez's orders.

## 2023-10-13 NOTE — DISCHARGE SUMMARY
[facility] - Cath Lab Services  Discharge Note  Short Stay    Procedure(s) (LRB):  Left heart cath (Left)  Stent, Drug Eluting, Multi Vessel, Coronary      OUTCOME: Patient tolerated treatment/procedure well without complication and is now ready for discharge.    DISPOSITION: Home or Self Care    FINAL DIAGNOSIS:  CAD    FOLLOWUP: In clinic    DISCHARGE INSTRUCTIONS:  No discharge procedures on file.      Clinical Reference Documents Added to Patient Instructions         Document    CARDIAC CATHETERIZATION DISCHARGE INSTRUCTIONS (ENGLISH)    CARDIAC REHAB INFORMATION (ENGLISH)    HEART HEALTHY DIET (ENGLISH)    LOWERING YOUR RISK OF HEART DISEASE (ENGLISH)            TIME SPENT ON DISCHARGE: 34 minutes

## 2024-08-20 ENCOUNTER — HOSPITAL ENCOUNTER (OUTPATIENT)
Facility: HOSPITAL | Age: 79
Discharge: HOME | End: 2024-08-20
Attending: INTERNAL MEDICINE | Admitting: INTERNAL MEDICINE
Payer: MEDICARE

## 2024-08-20 VITALS
DIASTOLIC BLOOD PRESSURE: 86 MMHG | HEART RATE: 70 BPM | WEIGHT: 170.19 LBS | SYSTOLIC BLOOD PRESSURE: 133 MMHG | HEIGHT: 71 IN | OXYGEN SATURATION: 97 % | TEMPERATURE: 98 F | RESPIRATION RATE: 20 BRPM | BODY MASS INDEX: 23.83 KG/M2

## 2024-08-20 DIAGNOSIS — R07.9 CHEST PAIN: ICD-10-CM

## 2024-08-20 DIAGNOSIS — I25.10 CORONARY ATHEROSCLEROSIS OF NATIVE CORONARY ARTERY: ICD-10-CM

## 2024-08-20 LAB — CATH EF QUANTITATIVE: 50 %

## 2024-08-20 PROCEDURE — C1760 CLOSURE DEV, VASC: HCPCS | Performed by: INTERNAL MEDICINE

## 2024-08-20 PROCEDURE — 25500020 PHARM REV CODE 255: Performed by: INTERNAL MEDICINE

## 2024-08-20 PROCEDURE — 99152 MOD SED SAME PHYS/QHP 5/>YRS: CPT | Performed by: INTERNAL MEDICINE

## 2024-08-20 PROCEDURE — 63600175 PHARM REV CODE 636 W HCPCS: Performed by: INTERNAL MEDICINE

## 2024-08-20 PROCEDURE — C1894 INTRO/SHEATH, NON-LASER: HCPCS | Performed by: INTERNAL MEDICINE

## 2024-08-20 PROCEDURE — 93459 L HRT ART/GRFT ANGIO: CPT | Performed by: INTERNAL MEDICINE

## 2024-08-20 PROCEDURE — 25000003 PHARM REV CODE 250: Performed by: INTERNAL MEDICINE

## 2024-08-20 DEVICE — ANGIO-SEAL VIP VASCULAR CLOSURE DEVICE
Type: IMPLANTABLE DEVICE | Site: GROIN | Status: FUNCTIONAL
Brand: ANGIO-SEAL

## 2024-08-20 RX ORDER — CEFAZOLIN SODIUM 1 G/3ML
INJECTION, POWDER, FOR SOLUTION INTRAMUSCULAR; INTRAVENOUS
Status: DISCONTINUED | OUTPATIENT
Start: 2024-08-20 | End: 2024-08-20 | Stop reason: HOSPADM

## 2024-08-20 RX ORDER — MIDAZOLAM HYDROCHLORIDE 1 MG/ML
INJECTION INTRAMUSCULAR; INTRAVENOUS
Status: DISCONTINUED | OUTPATIENT
Start: 2024-08-20 | End: 2024-08-20 | Stop reason: HOSPADM

## 2024-08-20 RX ORDER — IOPAMIDOL 755 MG/ML
INJECTION, SOLUTION INTRAVASCULAR
Status: DISCONTINUED | OUTPATIENT
Start: 2024-08-20 | End: 2024-08-20 | Stop reason: HOSPADM

## 2024-08-20 RX ORDER — HYDROCODONE BITARTRATE AND ACETAMINOPHEN 5; 325 MG/1; MG/1
1 TABLET ORAL EVERY 4 HOURS PRN
Status: DISCONTINUED | OUTPATIENT
Start: 2024-08-20 | End: 2024-08-20 | Stop reason: HOSPADM

## 2024-08-20 RX ORDER — FENTANYL CITRATE 50 UG/ML
INJECTION, SOLUTION INTRAMUSCULAR; INTRAVENOUS
Status: DISCONTINUED | OUTPATIENT
Start: 2024-08-20 | End: 2024-08-20 | Stop reason: HOSPADM

## 2024-08-20 RX ORDER — NITROGLYCERIN 0.4 MG/1
0.4 TABLET SUBLINGUAL EVERY 5 MIN PRN
COMMUNITY

## 2024-08-20 RX ORDER — DIAZEPAM 5 MG/1
10 TABLET ORAL
Status: DISCONTINUED | OUTPATIENT
Start: 2024-08-20 | End: 2024-08-20 | Stop reason: HOSPADM

## 2024-08-20 RX ORDER — SODIUM CHLORIDE 9 MG/ML
INJECTION, SOLUTION INTRAVENOUS ONCE
Status: DISCONTINUED | OUTPATIENT
Start: 2024-08-20 | End: 2024-08-20 | Stop reason: HOSPADM

## 2024-08-20 RX ORDER — SODIUM CHLORIDE 9 MG/ML
INJECTION, SOLUTION INTRAVENOUS CONTINUOUS
Status: DISCONTINUED | OUTPATIENT
Start: 2024-08-20 | End: 2024-08-20 | Stop reason: HOSPADM

## 2024-08-20 RX ORDER — MORPHINE SULFATE 4 MG/ML
2 INJECTION, SOLUTION INTRAMUSCULAR; INTRAVENOUS EVERY 4 HOURS PRN
Status: DISCONTINUED | OUTPATIENT
Start: 2024-08-20 | End: 2024-08-20 | Stop reason: HOSPADM

## 2024-08-20 RX ORDER — LIDOCAINE HYDROCHLORIDE 10 MG/ML
INJECTION, SOLUTION INFILTRATION; PERINEURAL
Status: DISCONTINUED | OUTPATIENT
Start: 2024-08-20 | End: 2024-08-20 | Stop reason: HOSPADM

## 2024-08-20 RX ORDER — ACETAMINOPHEN 325 MG/1
650 TABLET ORAL EVERY 4 HOURS PRN
Status: DISCONTINUED | OUTPATIENT
Start: 2024-08-20 | End: 2024-08-20 | Stop reason: HOSPADM

## 2024-08-20 RX ORDER — HYDRALAZINE HYDROCHLORIDE 20 MG/ML
10 INJECTION INTRAMUSCULAR; INTRAVENOUS EVERY 4 HOURS PRN
Status: DISCONTINUED | OUTPATIENT
Start: 2024-08-20 | End: 2024-08-20 | Stop reason: HOSPADM

## 2024-08-20 RX ORDER — ONDANSETRON 4 MG/1
8 TABLET, ORALLY DISINTEGRATING ORAL EVERY 8 HOURS PRN
Status: DISCONTINUED | OUTPATIENT
Start: 2024-08-20 | End: 2024-08-20 | Stop reason: HOSPADM

## 2024-08-20 RX ADMIN — DIAZEPAM 10 MG: 5 TABLET ORAL at 09:08

## 2024-08-20 NOTE — DISCHARGE INSTRUCTIONS
-Remove dressing in 24hrs  -No driving for two Days  -Do not lift anything heavier than a gallon of milk for 5 days  -No tub bathing for 5 days (if you have a groin site).   -No lotions, powders, creams around site for 5 days  - Return to the nearest emergency room if you start running a fever; have any kind of discharge coming from the site, the site looks red or swollen.  - If site starts to bleed, lay flat on the ground, apply pressure to the site and call 911.   Resume Eliquis Thursday Morning if no bleeding or hematoma.

## 2024-08-20 NOTE — H&P
Patient name: Suresh Aragon  MRN: [id number]  : 1945  Cath Lab Procedure H&P Update    Pre-Procedure Assessment:    I saw and examined the patient face to face. The patient has been re-evaluated and his condition is unchanged. The reason for admission, procedure and care is still present.  Based on the patients H&P, pre-procedure physical exam, relevant diagnostic studies, NPO status and information obtained from the patient, I determine the patient is an appropriate candidate for the proposed procedure and anesthesia planned. I further certify the anesthesia risks, benefits and options have been explained to the patient to which he agrees as documented on the procedural consent.    See scanned updated H&P and additional records from media tab.      Candido Martinez

## 2025-05-05 ENCOUNTER — OFFICE VISIT (OUTPATIENT)
Dept: ORTHOPEDICS | Facility: CLINIC | Age: 80
End: 2025-05-05
Payer: MEDICARE

## 2025-05-05 ENCOUNTER — HOSPITAL ENCOUNTER (OUTPATIENT)
Dept: RADIOLOGY | Facility: CLINIC | Age: 80
Discharge: HOME | End: 2025-05-05
Attending: PHYSICIAN ASSISTANT
Payer: MEDICARE

## 2025-05-05 ENCOUNTER — TELEPHONE (OUTPATIENT)
Dept: ORTHOPEDICS | Facility: CLINIC | Age: 80
End: 2025-05-05

## 2025-05-05 VITALS — HEIGHT: 71 IN | WEIGHT: 170.19 LBS | BODY MASS INDEX: 23.83 KG/M2

## 2025-05-05 DIAGNOSIS — M25.552 LEFT HIP PAIN: ICD-10-CM

## 2025-05-05 DIAGNOSIS — M54.16 LUMBAR RADICULOPATHY, CHRONIC: ICD-10-CM

## 2025-05-05 DIAGNOSIS — M54.9 BACK PAIN, UNSPECIFIED BACK LOCATION, UNSPECIFIED BACK PAIN LATERALITY, UNSPECIFIED CHRONICITY: ICD-10-CM

## 2025-05-05 DIAGNOSIS — M25.552 LEFT HIP PAIN: Primary | ICD-10-CM

## 2025-05-05 DIAGNOSIS — M54.16 LUMBAR RADICULOPATHY, CHRONIC: Primary | ICD-10-CM

## 2025-05-05 PROCEDURE — 72100 X-RAY EXAM L-S SPINE 2/3 VWS: CPT | Mod: ,,, | Performed by: PHYSICIAN ASSISTANT

## 2025-05-05 PROCEDURE — 73502 X-RAY EXAM HIP UNI 2-3 VIEWS: CPT | Mod: LT,,, | Performed by: PHYSICIAN ASSISTANT

## 2025-05-05 PROCEDURE — 1159F MED LIST DOCD IN RCRD: CPT | Mod: CPTII,,, | Performed by: PHYSICIAN ASSISTANT

## 2025-05-05 PROCEDURE — 99204 OFFICE O/P NEW MOD 45 MIN: CPT | Mod: ,,, | Performed by: PHYSICIAN ASSISTANT

## 2025-05-05 PROCEDURE — 1160F RVW MEDS BY RX/DR IN RCRD: CPT | Mod: CPTII,,, | Performed by: PHYSICIAN ASSISTANT

## 2025-05-05 RX ORDER — OMEPRAZOLE 40 MG/1
40 CAPSULE, DELAYED RELEASE ORAL
COMMUNITY

## 2025-05-05 NOTE — PROGRESS NOTES
Chief Complaint:   Chief Complaint   Patient presents with    Hip Pain     L hip - ongoing for about a month. States he was in the car for a while. When he went to get up from sitting he was not able to walk. States he went to see his PCP was giving an injection to relief. States pain radiates from low back down the leg and to the groin        History of present illness:    This is a 80 y.o. year old   History of Present Illness    CHIEF COMPLAINT:  - Left groin and leg pain extending to the knee, with associated lower back pain.    HPI:  Suresh presents with left groin and leg pain that began suddenly during a car trip to [place]. Pain extends down to his left knee. He was initially unable to walk upon arrival, requiring a wheelchair. After resting in bed for about 20 minutes, he resumed activities, including dancing, without issues. However, pain persisted after returning home and is now constant in his legs.    Pain worsens with walking, and he is unable to straighten up completely. He reports mild back pain. He has no pins, needles, or numbness in his leg, and no pain extending to his foot. He also reports no loss of strength in his feet.    He has a history of three compression fractures in L5 from a fall out of a tree stand approximately 20 years ago. He and his partner engage in frequent dancing. He has not sought medical attention specifically for his back before this visit.    He denies any recent compression fractures or sudden onset of severe back pain. Suresh received an injection from their family physician for back pain at a specific spot that was hurting.  Suresh had two epidurals in the back within two months.    IMAGING:  - XR Hip: Mild arthritis, mainly in the lower part of the socket. Good joint space superiorly, slightly narrowed inferiorly.  - XR L Spine: L4-L5 and L5-S1 levels show almost complete loss of disc space. Nerve openings are narrowing at lower levels. Old compression fracture at  L1 is visible.          Current Outpatient Medications   Medication Sig    apixaban (ELIQUIS) 5 mg Tab Take 1 tablet (5 mg total) by mouth 2 (two) times daily.    atorvastatin (LIPITOR) 40 MG tablet Take 1 tablet (40 mg total) by mouth once daily.    clopidogreL (PLAVIX) 75 mg tablet Take 1 tablet (75 mg total) by mouth once daily.    famotidine (PEPCID) 20 MG tablet Take 40 mg by mouth 2 (two) times daily.    isosorbide mononitrate (IMDUR) 30 MG 24 hr tablet Take 30 mg by mouth daily as needed. Patient states he takes 1/2tab as needed    nitroGLYCERIN (NITROSTAT) 0.4 MG SL tablet Place 0.4 mg under the tongue every 5 (five) minutes as needed for Chest pain.    omeprazole (PRILOSEC) 40 MG capsule Take 40 mg by mouth.    tadalafiL (CIALIS) 5 MG tablet Take 5 mg by mouth daily as needed.    metoprolol succinate (TOPROL-XL) 25 MG 24 hr tablet Take 1 tablet (25 mg total) by mouth once daily.    traMADoL (ULTRAM-ER) 100 MG Tb24 Take 50 mg by mouth daily as needed. (Patient not taking: Reported on 5/5/2025)    traZODone (DESYREL) 50 MG tablet Take 100 mg by mouth every evening. (Patient not taking: Reported on 5/5/2025)    valsartan (DIOVAN) 40 MG tablet Take 1 tablet (40 mg total) by mouth once daily.     No current facility-administered medications for this visit.     Facility-Administered Medications Ordered in Other Visits   Medication    0.9%  NaCl infusion       Review of Systems:    Constitution:   Denies chills, fever, and sweats.  HENT:   Denies headaches or blurry vision.  Cardiovascular:  Denies chest pain or irregular heart beat.  Respiratory:   Denies cough or shortness of breath.  Gastrointestinal:  Denies abdominal pain, nausea, or vomiting.  Musculoskeletal:   Denies muscle cramps.  Neurological:   Denies dizziness or focal weakness.  Psychiatric/Behavior: Normal mental status.  Hematology/Lymph:  Denies bleeding problem or easy bruising/bleeding.  Skin:    Denies rash or suspicious  "lesions.    Examination:    Vital Signs:    Vitals:    05/05/25 1049   BP: (P) 110/73   Pulse: (!) (P) 59   Weight: 77.2 kg (170 lb 3.1 oz)   Height: 5' 11" (1.803 m)       Body mass index is 23.74 kg/m².    Constitution:   Well-developed, well nourished patient in no acute distress.  Neurological:   Alert and oriented x 3 and cooperative to examination.     Psychiatric/Behavior: Normal mental status.  Respiratory:   No shortness of breath.  Eyes:    Extraoccular muscles intact  Skin:    No scars, rash or suspicious lesions.    Physical Exam:   Hip Exam:  Left  No obvious deformity.   Flexion to 120 degrees and internal and external rotation to 40 degrees.   Abduction to 45 degree and adduction to 30 degrees.   Negative VIANNEY test.   Negative Stinchfield test.   No flexion contracture.   Negative  greater trochanteric tenderness.   Negative KAMRAN test.   5/5 strength, normal skin appearance and palpable pulses distally. Sensibility normal.    Lumbar Exam     No swelling, erythema or increased heat   Positive tenderness over spinous process and paravertebral musculature   Positive Discomfort with lumbar flexion, extension, lateral rotation and bending   Manual motor testing of the lower extremities is 5 out of 5 bilaterally   Positive Straight leg raise   No sensory deficits to light touch pedal pulses 2+   Full pain-free range of motion through the right and left hip joint     Imaging: X-rays ordered and images interpreted today personally by me of left hip three views.    Patient does have some mild arthritic changes of the inferior acetabular femoral articulation.    Well-maintained joint space in the superior aspect.        Lumbar x-rays two views taken today.    Patient does have multilevel degenerative disc disease   More pronounced at L4-5 which is bone-on-bone.    Hypertrophic osteophyte formation.    Eight old compression fracture noted at L1 (which he states he had proximally 22 years ago follow up from a " deer stand)     Assessment: Left hip pain  -     X-Ray Hip 2 or 3 views Left with Pelvis when performed; Future; Expected date: 05/05/2025    Back pain, unspecified back location, unspecified back pain laterality, unspecified chronicity  -     X-Ray Lumbar Spine 2 Or 3 Views; Future; Expected date: 05/05/2025         Plan:    Assessment & Plan    LOW BACK PAIN / SPINAL STENOSIS:  - Discussed potential future epidural injections for back pain, -   Ordered MRI of the lumbar spine in his patient has a pacemaker in which they report is MRI compatible  - Referred to Dr. Best for potential epidural injection.  We will call after MRI results    LONG-TERM USE OF ANTICOAGULANTS:  - Discussed potential future epidural injections for back pain, pending CT results and cardiologist approval due to blood thinners.                This note was generated with the assistance of ambient listening technology. Verbal consent was obtained by the patient and accompanying visitor(s) for the recording of patient appointment to facilitate this note. I attest to having reviewed and edited the generated note for accuracy, though some syntax or spelling errors may persist. Please contact the author of this note for any clarification.       DISCLAIMER: This note may have been dictated using voice recognition software and may contain grammatical errors.     NOTE: Consult report sent to referring provider via [email or web address].

## 2025-05-05 NOTE — TELEPHONE ENCOUNTER
Wife called regarding patient pacemaker. She stated that his pacemaker is MRI compatible.    Called Kate Margo to let him know that the provider was informed of his pacemaker and the MRI order has been put in.     Patient was also informed that the MRI facility will call them to set up an appointment

## 2025-05-26 ENCOUNTER — OFFICE VISIT (OUTPATIENT)
Facility: CLINIC | Age: 80
End: 2025-05-26
Payer: MEDICARE

## 2025-05-26 VITALS
DIASTOLIC BLOOD PRESSURE: 79 MMHG | HEART RATE: 66 BPM | WEIGHT: 169 LBS | BODY MASS INDEX: 23.66 KG/M2 | SYSTOLIC BLOOD PRESSURE: 127 MMHG | HEIGHT: 71 IN

## 2025-05-26 DIAGNOSIS — M51.362 DEGENERATION OF INTERVERTEBRAL DISC OF LUMBAR REGION WITH DISCOGENIC BACK PAIN AND LOWER EXTREMITY PAIN: Primary | ICD-10-CM

## 2025-05-26 DIAGNOSIS — M47.816 LUMBAR SPONDYLOSIS: ICD-10-CM

## 2025-05-26 DIAGNOSIS — M54.9 CHRONIC BACK PAIN GREATER THAN 3 MONTHS DURATION: ICD-10-CM

## 2025-05-26 DIAGNOSIS — G89.29 CHRONIC BACK PAIN GREATER THAN 3 MONTHS DURATION: ICD-10-CM

## 2025-05-26 DIAGNOSIS — M54.16 LUMBAR RADICULOPATHY, CHRONIC: ICD-10-CM

## 2025-05-26 PROCEDURE — 3074F SYST BP LT 130 MM HG: CPT | Mod: CPTII,,, | Performed by: ANESTHESIOLOGY

## 2025-05-26 PROCEDURE — 1125F AMNT PAIN NOTED PAIN PRSNT: CPT | Mod: CPTII,,, | Performed by: ANESTHESIOLOGY

## 2025-05-26 PROCEDURE — 1160F RVW MEDS BY RX/DR IN RCRD: CPT | Mod: CPTII,,, | Performed by: ANESTHESIOLOGY

## 2025-05-26 PROCEDURE — 99204 OFFICE O/P NEW MOD 45 MIN: CPT | Mod: ,,, | Performed by: ANESTHESIOLOGY

## 2025-05-26 PROCEDURE — 3288F FALL RISK ASSESSMENT DOCD: CPT | Mod: CPTII,,, | Performed by: ANESTHESIOLOGY

## 2025-05-26 PROCEDURE — 1159F MED LIST DOCD IN RCRD: CPT | Mod: CPTII,,, | Performed by: ANESTHESIOLOGY

## 2025-05-26 PROCEDURE — 3078F DIAST BP <80 MM HG: CPT | Mod: CPTII,,, | Performed by: ANESTHESIOLOGY

## 2025-05-26 PROCEDURE — 1101F PT FALLS ASSESS-DOCD LE1/YR: CPT | Mod: CPTII,,, | Performed by: ANESTHESIOLOGY

## 2025-05-26 RX ORDER — FAMOTIDINE 40 MG/1
40 TABLET, FILM COATED ORAL NIGHTLY
COMMUNITY

## 2025-05-26 RX ORDER — SERTRALINE HYDROCHLORIDE 50 MG/1
TABLET, FILM COATED ORAL
COMMUNITY
Start: 2025-05-13 | End: 2025-06-10

## 2025-05-26 NOTE — LETTER
To: Dr. Candido Martinez    Patient Name: Suresh Aragon  : 1945    Procedure TFESI  Lt L4 & L5 on 2025.    The above patient is being scheduled for a procedure under fluoroscopy with iodine that will require to discontinue one or more of the following medications:     Current Anticoagulant: apixaban Tab - 5 mg    []   Coumadin/Warfarin for 5 days   []   Aspirin for 7 days      [x]   Plavix for 7 days  []   Xarelto for 2 days   [x]   Eliquis for 2 days       Please check one:    If patient is on Coumadin, will he/she need Lovenox in the interim?    []  Yes        []  No      Please check one:    []  I will manage the Lovenox prescription before and after the injection.        Approved by: ___________________________  Date: ______________      Denied by: _____________________________  Date: ______________    [facility]   Sammi Rees MD    [address] [place] [address], [address] [address]  [phone] (Phone)       [phone] (Fax)

## 2025-05-26 NOTE — PROGRESS NOTES
Sammi Rees MD        PATIENT NAME: Suresh Aragon  : 1945  DATE: 25  MRN: [id number]      Billing Provider: Sammi Rees MD  Level of Service:   Patient PCP Information       Provider PCP Type    Desire Reddy MD General            Reason for Visit / Chief Complaint: Back Pain (Referral from Zafar DAMON for lumbar pain MRI in imaging C/O pain level 8, Taking tylenol for pain, pt had a fall from tree stand years ago had a compression fx)       Update PCP  Update Chief Complaint         History of Present Illness / Problem Focused Workflow     Suresh Aragon presents to the clinic with Back Pain (Referral from Zafar DAMON for lumbar pain MRI in imaging C/O pain level 8, Taking tylenol for pain, pt had a fall from tree stand years ago had a compression fx)     This is an 80-year-old male who presents to clinic today for his initial consultation as a referral from his orthopedist.  He complains of back pain radiating down the left lower extremity to the knee.  He drove from [facility] in March of this year to go on a cruise.  When he got to [place], he could not walk.  He had to get a wheelchair to get onto the cruise ship.  He rested for about 30 minutes and then was better.  After the cruise, his pain came back and was radiating down the left lower extremity to his knee.  Prior to this, he was very active with dancing and working with horses.  He denies any numbness or tingling or weakness in his leg.  His right leg is fine.  He states that the muscles in his lower back tightened up when he is walking.  His pain is averaging 6 or 7/10 on the NRS.  He takes Tylenol for relief.  He tried some over-the-counter pain relief patches but they did not help.      Back Pain  Associated symptoms include leg pain.       Review of Systems     Review of Systems   Musculoskeletal:  Positive for back pain, gait problem and leg pain.   Psychiatric/Behavioral:  Positive for sleep  disturbance.    All other systems reviewed and are negative.       Medical / Social / Family History     Past Medical History:   Diagnosis Date    A-fib     CAD (coronary artery disease)     GERD (gastroesophageal reflux disease)     HLD (hyperlipidemia)     HTN (hypertension)        Past Surgical History:   Procedure Laterality Date    ANGIOGRAM, CORONARY, WITH LEFT HEART CATHETERIZATION N/A 12/02/2022    Procedure: Angiogram, Coronary, with Left Heart Cath;  Surgeon: Candido Martinez MD;  Location: [facility] CATH LAB;  Service: Cardiology;  Laterality: N/A;    APPENDECTOMY  1951    None    CORONARY ARTERY BYPASS GRAFT      CORONARY ARTERY BYPASS GRAFT (CABG) N/A 12/05/2022    Procedure: CORONARY ARTERY BYPASS GRAFT (CABG);  Surgeon: Vini Ricks MD;  Location: [facility];  Service: Cardiothoracic;  Laterality: N/A;  Case #3    EYE SURGERY  1995    Rt eye    INSERT / REPLACE / REMOVE PACEMAKER      INSERTION OF PACEMAKER N/A 12/08/2022    Procedure: INSERTION, CARDIAC PACEMAKER;  Surgeon: Vini Ricks MD;  Location: [facility];  Service: Cardiology;  Laterality: N/A;  INSERTION OF PERM PACEMAKER //    REP - PAT    LEFT HEART CATHETERIZATION Left 07/21/2023    Procedure: Left heart cath;  Surgeon: Candido Martinez MD;  Location: [facility] CATH LAB;  Service: Cardiology;  Laterality: Left;  LHC VIA RT GROIN    LEFT HEART CATHETERIZATION Left 09/26/2023    Procedure: Left heart cath;  Surgeon: Candido Martinez MD;  Location: [facility] CATH LAB;  Service: Cardiology;  Laterality: Left;  LHC VIA RT GROIN NATIVE RCA INTERVENTION    LEFT HEART CATHETERIZATION Left 08/20/2024    Procedure: Left heart cath;  Surgeon: Candido Martinez MD;  Location: [facility] CATH LAB;  Service: Cardiology;  Laterality: Left;  LHC VIA RT GROIN    PROSTATE SURGERY      STENT, DRUG ELUTING, MULTI VESSEL, CORONARY  09/26/2023    Procedure: Stent, Drug Eluting, Multi Vessel, Coronary;  Surgeon: Candido Martinez MD;  Location: [facility] CATH LAB;  Service:  Cardiology;;    TONSILLECTOMY         Social History  Mr. Aragon  reports that he has never smoked. He has never used smokeless tobacco. He reports that he does not drink alcohol and does not use drugs.    Family History  Mr.'s Aragon family history includes Asthma in his mother; Diabetes in his mother.    Medications and Allergies     Medications  Outpatient Medications Marked as Taking for the 5/26/25 encounter (Office Visit) with Sammi Rees MD   Medication Sig Dispense Refill    apixaban (ELIQUIS) 5 mg Tab Take 1 tablet (5 mg total) by mouth 2 (two) times daily. 60 tablet 11    nitroGLYCERIN (NITROSTAT) 0.4 MG SL tablet Place 0.4 mg under the tongue every 5 (five) minutes as needed for Chest pain.      omeprazole (PRILOSEC) 40 MG capsule Take 40 mg by mouth.      sertraline (ZOLOFT) 50 MG tablet Take by mouth.      tadalafiL (CIALIS) 5 MG tablet Take 5 mg by mouth daily as needed.      [DISCONTINUED] famotidine (PEPCID) 20 MG tablet Take 40 mg by mouth 2 (two) times daily.      [DISCONTINUED] traZODone (DESYREL) 50 MG tablet Take 100 mg by mouth every evening.         Allergies  Review of patient's allergies indicates:   Allergen Reactions    Lisinopril      Other Reaction(s): Angioedema    Diphenhydramine      chills       Physical Examination     Vitals:    05/26/25 1041   BP: 127/79   Pulse: 66     Pain Disability Index (PDI): 49       Spine Musculoskeletal Exam    Gait    Gait is normal.    Inspection    Thoracolumbar    Thoracolumbar inspection is normal.    Palpation    Thoracolumbar    Tenderness: present      Paraspinous: left      Gluteal: left      SI Joint: left    Right      Masses: none      Spasms: none      Crepitus: none      Muscle tone: normal    Left      Masses: none      Spasms: none      Crepitus: none      Muscle tone: normal    Range of Motion    Thoracolumbar        Thoracolumbar range of motion additional comments: Limited range of motion in the lumbar spine due to  pain.    Strength    Thoracolumbar    Thoracolumbar motor exam is normal.       Sensory    Thoracolumbar    Thoracolumbar sensation is normal.    General      Constitutional: appears stated age, well-developed and well-nourished    Scleral icterus: no    Labored breathing: no    Psychiatric: normal mood and affect and no acute distress    Neurological: alert and oriented x3    Skin: intact    Lymphadenopathy: none  CV: extremities warm, well-perfused  Resp: nonlabored breathing       Assessment and Plan (including Health Maintenance)      Problem List  Smart Sets  Document Outside HM   :    Plan:   Chronic back pain greater than 3 months duration    Lumbar radiculopathy, chronic  -     Ambulatory referral/consult to Pain Clinic    Degeneration of intervertebral disc of lumbar region with discogenic back pain and lower extremity pain       He is being scheduled for left L4 and L5 transforaminal epidural steroid injections today to help with his chronic low back pain radiating into the left lower extremity, consistent with findings of degenerative disc disease seen on his MRI.  We discussed referring him to physical therapy after the injection as well.    Problem List Items Addressed This Visit          Neuro    Lumbar radiculopathy, chronic    Degeneration of intervertebral disc of lumbar region with discogenic back pain and lower extremity pain       Orthopedic    Chronic back pain greater than 3 months duration - Primary         Future Appointments   Date Time Provider Department Center   7/3/2025  3:00 PM [facility]        There are no Patient Instructions on file for this visit.  No follow-ups on file.     Signature:  Sammi Rees MD      Date of encounter: 5/26/25

## 2025-05-26 NOTE — H&P (VIEW-ONLY)
Sammi Rees MD        PATIENT NAME: Suresh Aragon  : 1945  DATE: 25  MRN: [id number]      Billing Provider: Sammi Rees MD  Level of Service:   Patient PCP Information       Provider PCP Type    Desire Reddy MD General            Reason for Visit / Chief Complaint: Back Pain (Referral from Zafar DAMON for lumbar pain MRI in imaging C/O pain level 8, Taking tylenol for pain, pt had a fall from tree stand years ago had a compression fx)       Update PCP  Update Chief Complaint         History of Present Illness / Problem Focused Workflow     Suresh Aragon presents to the clinic with Back Pain (Referral from Zafar DAMON for lumbar pain MRI in imaging C/O pain level 8, Taking tylenol for pain, pt had a fall from tree stand years ago had a compression fx)     This is an 80-year-old male who presents to clinic today for his initial consultation as a referral from his orthopedist.  He complains of back pain radiating down the left lower extremity to the knee.  He drove from [facility] in March of this year to go on a cruise.  When he got to [place], he could not walk.  He had to get a wheelchair to get onto the cruise ship.  He rested for about 30 minutes and then was better.  After the cruise, his pain came back and was radiating down the left lower extremity to his knee.  Prior to this, he was very active with dancing and working with horses.  He denies any numbness or tingling or weakness in his leg.  His right leg is fine.  He states that the muscles in his lower back tightened up when he is walking.  His pain is averaging 6 or 7/10 on the NRS.  He takes Tylenol for relief.  He tried some over-the-counter pain relief patches but they did not help.      Back Pain  Associated symptoms include leg pain.       Review of Systems     Review of Systems   Musculoskeletal:  Positive for back pain, gait problem and leg pain.   Psychiatric/Behavioral:  Positive for sleep  disturbance.    All other systems reviewed and are negative.       Medical / Social / Family History     Past Medical History:   Diagnosis Date    A-fib     CAD (coronary artery disease)     GERD (gastroesophageal reflux disease)     HLD (hyperlipidemia)     HTN (hypertension)        Past Surgical History:   Procedure Laterality Date    ANGIOGRAM, CORONARY, WITH LEFT HEART CATHETERIZATION N/A 12/02/2022    Procedure: Angiogram, Coronary, with Left Heart Cath;  Surgeon: Candido Martinez MD;  Location: [facility] CATH LAB;  Service: Cardiology;  Laterality: N/A;    APPENDECTOMY  1951    None    CORONARY ARTERY BYPASS GRAFT      CORONARY ARTERY BYPASS GRAFT (CABG) N/A 12/05/2022    Procedure: CORONARY ARTERY BYPASS GRAFT (CABG);  Surgeon: Vini Ricks MD;  Location: [facility];  Service: Cardiothoracic;  Laterality: N/A;  Case #3    EYE SURGERY  1995    Rt eye    INSERT / REPLACE / REMOVE PACEMAKER      INSERTION OF PACEMAKER N/A 12/08/2022    Procedure: INSERTION, CARDIAC PACEMAKER;  Surgeon: Vini Ricks MD;  Location: [facility];  Service: Cardiology;  Laterality: N/A;  INSERTION OF PERM PACEMAKER //    REP - PAT    LEFT HEART CATHETERIZATION Left 07/21/2023    Procedure: Left heart cath;  Surgeon: Candido Martinez MD;  Location: [facility] CATH LAB;  Service: Cardiology;  Laterality: Left;  LHC VIA RT GROIN    LEFT HEART CATHETERIZATION Left 09/26/2023    Procedure: Left heart cath;  Surgeon: Candido Martinez MD;  Location: [facility] CATH LAB;  Service: Cardiology;  Laterality: Left;  LHC VIA RT GROIN NATIVE RCA INTERVENTION    LEFT HEART CATHETERIZATION Left 08/20/2024    Procedure: Left heart cath;  Surgeon: Candido Martinez MD;  Location: [facility] CATH LAB;  Service: Cardiology;  Laterality: Left;  LHC VIA RT GROIN    PROSTATE SURGERY      STENT, DRUG ELUTING, MULTI VESSEL, CORONARY  09/26/2023    Procedure: Stent, Drug Eluting, Multi Vessel, Coronary;  Surgeon: Candido Martinez MD;  Location: [facility] CATH LAB;  Service:  Cardiology;;    TONSILLECTOMY         Social History  Mr. Aragon  reports that he has never smoked. He has never used smokeless tobacco. He reports that he does not drink alcohol and does not use drugs.    Family History  Mr.'s Aragon family history includes Asthma in his mother; Diabetes in his mother.    Medications and Allergies     Medications  Outpatient Medications Marked as Taking for the 5/26/25 encounter (Office Visit) with Sammi Rees MD   Medication Sig Dispense Refill    apixaban (ELIQUIS) 5 mg Tab Take 1 tablet (5 mg total) by mouth 2 (two) times daily. 60 tablet 11    nitroGLYCERIN (NITROSTAT) 0.4 MG SL tablet Place 0.4 mg under the tongue every 5 (five) minutes as needed for Chest pain.      omeprazole (PRILOSEC) 40 MG capsule Take 40 mg by mouth.      sertraline (ZOLOFT) 50 MG tablet Take by mouth.      tadalafiL (CIALIS) 5 MG tablet Take 5 mg by mouth daily as needed.      [DISCONTINUED] famotidine (PEPCID) 20 MG tablet Take 40 mg by mouth 2 (two) times daily.      [DISCONTINUED] traZODone (DESYREL) 50 MG tablet Take 100 mg by mouth every evening.         Allergies  Review of patient's allergies indicates:   Allergen Reactions    Lisinopril      Other Reaction(s): Angioedema    Diphenhydramine      chills       Physical Examination     Vitals:    05/26/25 1041   BP: 127/79   Pulse: 66     Pain Disability Index (PDI): 49       Spine Musculoskeletal Exam    Gait    Gait is normal.    Inspection    Thoracolumbar    Thoracolumbar inspection is normal.    Palpation    Thoracolumbar    Tenderness: present      Paraspinous: left      Gluteal: left      SI Joint: left    Right      Masses: none      Spasms: none      Crepitus: none      Muscle tone: normal    Left      Masses: none      Spasms: none      Crepitus: none      Muscle tone: normal    Range of Motion    Thoracolumbar        Thoracolumbar range of motion additional comments: Limited range of motion in the lumbar spine due to  pain.    Strength    Thoracolumbar    Thoracolumbar motor exam is normal.       Sensory    Thoracolumbar    Thoracolumbar sensation is normal.    General      Constitutional: appears stated age, well-developed and well-nourished    Scleral icterus: no    Labored breathing: no    Psychiatric: normal mood and affect and no acute distress    Neurological: alert and oriented x3    Skin: intact    Lymphadenopathy: none  CV: extremities warm, well-perfused  Resp: nonlabored breathing       Assessment and Plan (including Health Maintenance)      Problem List  Smart Sets  Document Outside HM   :    Plan:   Chronic back pain greater than 3 months duration    Lumbar radiculopathy, chronic  -     Ambulatory referral/consult to Pain Clinic    Degeneration of intervertebral disc of lumbar region with discogenic back pain and lower extremity pain       He is being scheduled for left L4 and L5 transforaminal epidural steroid injections today to help with his chronic low back pain radiating into the left lower extremity, consistent with findings of degenerative disc disease seen on his MRI.  We discussed referring him to physical therapy after the injection as well.    Problem List Items Addressed This Visit          Neuro    Lumbar radiculopathy, chronic    Degeneration of intervertebral disc of lumbar region with discogenic back pain and lower extremity pain       Orthopedic    Chronic back pain greater than 3 months duration - Primary         Future Appointments   Date Time Provider Department Center   7/3/2025  3:00 PM [facility] Re[facility]        There are no Patient Instructions on file for this visit.  No follow-ups on file.     Signature:  Sammi Rees MD      Date of encounter: 5/26/25

## 2025-05-27 ENCOUNTER — TELEPHONE (OUTPATIENT)
Facility: CLINIC | Age: 80
End: 2025-05-27
Payer: MEDICARE

## 2025-05-27 DIAGNOSIS — M54.50 ACUTE LOW BACK PAIN, UNSPECIFIED BACK PAIN LATERALITY, UNSPECIFIED WHETHER SCIATICA PRESENT: ICD-10-CM

## 2025-05-27 DIAGNOSIS — G89.29 CHRONIC BACK PAIN GREATER THAN 3 MONTHS DURATION: Primary | ICD-10-CM

## 2025-05-27 DIAGNOSIS — M54.9 CHRONIC BACK PAIN GREATER THAN 3 MONTHS DURATION: Primary | ICD-10-CM

## 2025-05-27 RX ORDER — TRAMADOL HYDROCHLORIDE 50 MG/1
50 TABLET, FILM COATED ORAL 3 TIMES DAILY PRN
Qty: 20 EACH | Refills: 0 | Status: SHIPPED | OUTPATIENT
Start: 2025-05-27

## 2025-05-27 RX ORDER — TRAMADOL HYDROCHLORIDE 50 MG/1
50 TABLET, FILM COATED ORAL 3 TIMES DAILY PRN
Qty: 20 EACH | Refills: 0 | Status: SHIPPED | OUTPATIENT
Start: 2025-05-27 | End: 2025-05-27 | Stop reason: SDUPTHER

## 2025-05-27 NOTE — TELEPHONE ENCOUNTER
Pts daughter called again asking to please send Rx for Tramadol to the [facility] pharmacy also asking if Tramadol can be prescribed for 3 weeks.    Pts daughter called the office she will call [facility] pharmacy per [facility]

## 2025-05-27 NOTE — TELEPHONE ENCOUNTER
Pts daughter called the office stating @ pts visit yesterday you were going to call in some tramadol for the pt and it was to be sent to the [facility] pharmacy in [facility] pharmacy hasn't received Rx.

## 2025-05-27 NOTE — TELEPHONE ENCOUNTER
Spoke w/pts daughter told her rx was sent to the [facility] pharmacy pt verbalized understanding

## 2025-06-16 ENCOUNTER — HOSPITAL ENCOUNTER (OUTPATIENT)
Facility: HOSPITAL | Age: 80
Discharge: HOME | End: 2025-06-16
Attending: ANESTHESIOLOGY | Admitting: ANESTHESIOLOGY
Payer: MEDICARE

## 2025-06-16 DIAGNOSIS — G89.29 CHRONIC BACK PAIN GREATER THAN 3 MONTHS DURATION: Primary | ICD-10-CM

## 2025-06-16 DIAGNOSIS — M54.9 CHRONIC BACK PAIN GREATER THAN 3 MONTHS DURATION: Primary | ICD-10-CM

## 2025-06-16 PROCEDURE — 64483 NJX AA&/STRD TFRM EPI L/S 1: CPT | Mod: LT,,, | Performed by: ANESTHESIOLOGY

## 2025-06-16 PROCEDURE — 25000003 PHARM REV CODE 250: Performed by: ANESTHESIOLOGY

## 2025-06-16 PROCEDURE — 64483 NJX AA&/STRD TFRM EPI L/S 1: CPT | Mod: LT | Performed by: ANESTHESIOLOGY

## 2025-06-16 PROCEDURE — 63600175 PHARM REV CODE 636 W HCPCS: Performed by: ANESTHESIOLOGY

## 2025-06-16 RX ORDER — DIAZEPAM 5 MG/1
10 TABLET ORAL
Status: DISCONTINUED | OUTPATIENT
Start: 2025-06-16 | End: 2025-06-16 | Stop reason: HOSPADM

## 2025-06-16 RX ORDER — BUPIVACAINE HYDROCHLORIDE 2.5 MG/ML
INJECTION, SOLUTION EPIDURAL; INFILTRATION; INTRACAUDAL; PERINEURAL
Status: DISCONTINUED
Start: 2025-06-16 | End: 2025-06-16 | Stop reason: HOSPADM

## 2025-06-16 RX ORDER — LIDOCAINE HYDROCHLORIDE 10 MG/ML
INJECTION, SOLUTION EPIDURAL; INFILTRATION; INTRACAUDAL; PERINEURAL
Status: DISCONTINUED
Start: 2025-06-16 | End: 2025-06-16 | Stop reason: HOSPADM

## 2025-06-16 RX ORDER — LIDOCAINE HYDROCHLORIDE 10 MG/ML
INJECTION, SOLUTION EPIDURAL; INFILTRATION; INTRACAUDAL; PERINEURAL
Status: DISCONTINUED | OUTPATIENT
Start: 2025-06-16 | End: 2025-06-16 | Stop reason: HOSPADM

## 2025-06-16 RX ORDER — BUPIVACAINE HYDROCHLORIDE 2.5 MG/ML
INJECTION, SOLUTION EPIDURAL; INFILTRATION; INTRACAUDAL; PERINEURAL
Status: DISCONTINUED | OUTPATIENT
Start: 2025-06-16 | End: 2025-06-16 | Stop reason: HOSPADM

## 2025-06-16 RX ORDER — DEXAMETHASONE SODIUM PHOSPHATE 10 MG/ML
INJECTION, SOLUTION INTRA-ARTICULAR; INTRALESIONAL; INTRAMUSCULAR; INTRAVENOUS; SOFT TISSUE
Status: DISCONTINUED
Start: 2025-06-16 | End: 2025-06-16 | Stop reason: HOSPADM

## 2025-06-16 RX ORDER — DEXAMETHASONE SODIUM PHOSPHATE 10 MG/ML
INJECTION, SOLUTION INTRA-ARTICULAR; INTRALESIONAL; INTRAMUSCULAR; INTRAVENOUS; SOFT TISSUE
Status: DISCONTINUED | OUTPATIENT
Start: 2025-06-16 | End: 2025-06-16 | Stop reason: HOSPADM

## 2025-06-16 RX ORDER — DESVENLAFAXINE 50 MG/1
50 TABLET, FILM COATED, EXTENDED RELEASE ORAL DAILY
COMMUNITY

## 2025-06-16 RX ORDER — TRAMADOL HYDROCHLORIDE 50 MG/1
50 TABLET, FILM COATED ORAL 2 TIMES DAILY PRN
Qty: 50 EACH | Refills: 0 | Status: SHIPPED | OUTPATIENT
Start: 2025-06-16

## 2025-06-16 RX ORDER — ZALEPLON 5 MG/1
5 CAPSULE ORAL NIGHTLY PRN
COMMUNITY

## 2025-06-16 RX ADMIN — DIAZEPAM 5 MG: 5 TABLET ORAL at 10:06

## 2025-06-16 NOTE — OP NOTE
Procedure:    Left L4 transforaminal epidural steroid injection    Left L5 transforaminal epidural steroid injection    Pre-Procedure Diagnoses:  Chronic back pain greater than 3 months  Lumbar degenerative disc disease  Lumbar radiculopathy  Lumbar disc displacement    Post-Procedure Diagnoses:  Chronic back pain greater than 3 months  Lumbar degenerative disc disease  Lumbar radiculopathy  Lumbar disc displacement    Anesthesia:  Local    Estimated Blood Loss:  < 2 ML    Consent:  The procedure, risks, benefits, and alternatives were discussed with the patient.  The patient voiced understanding and fully informed written consent was obtained.    Description of the Procedure:  The patient was taken to the operating room and placed in the prone position. The skin overlying the lumbar spine was prepped with Chloraprep and draped in the usual sterile fashion.  An oblique fluoroscopic view was obtained on the left side at L4, with the superior articular process of the inferior vertebral body aligned with the pedicle.  Skin anesthesia was achieved using 2 mL of lidocaine 1%.  A 22-gauge 5 -inch Quinke spinal needle was inserted and advanced under intermittent fluoroscopic views into the epidural space. Proper needle position was confirmed under AP, oblique, and lateral fluoroscopic views.  Negative aspiration for blood or CSF was confirmed. 1 mL of contrast was injected, which revealed spread into the epidural space.  Then a combination of 5 mg of dexamethasone with 1 mL of 0.25% bupivacaine was easily injected.   There was no pain on injection. The needle was removed intact and bleeding was nil.  The same procedure was repeated in identical fashion on the left side at L5.  Sterile bandages were applied. The patient was taken to the recovery room for further observation in stable condition. The patient was then discharged home with no complications.

## 2025-06-16 NOTE — PLAN OF CARE
Problem: Dysrhythmia  Goal: Normalized Cardiac Rhythm  Outcome: Met     Problem: Hypertension Acute  Goal: Blood Pressure Within Desired Range  Outcome: Met     Problem: Fall Injury Risk  Goal: Absence of Fall and Fall-Related Injury  Outcome: Met     Problem: Infection  Goal: Absence of Infection Signs and Symptoms  Outcome: Met     Problem: Pain Acute  Goal: Optimal Pain Control and Function  Outcome: Met     Problem: Adult Inpatient Plan of Care  Goal: Plan of Care Review  Outcome: Met  Goal: Patient-Specific Goal (Individualized)  Outcome: Met  Goal: Absence of Hospital-Acquired Illness or Injury  Outcome: Met  Goal: Optimal Comfort and Wellbeing  Outcome: Met  Goal: Readiness for Transition of Care  Outcome: Met     Problem: Wound  Goal: Optimal Coping  Outcome: Met  Goal: Optimal Functional Ability  Outcome: Met  Goal: Absence of Infection Signs and Symptoms  Outcome: Met  Goal: Improved Oral Intake  Outcome: Met  Goal: Optimal Pain Control and Function  Outcome: Met  Goal: Skin Health and Integrity  Outcome: Met  Goal: Optimal Wound Healing  Outcome: Met

## 2025-06-16 NOTE — DISCHARGE SUMMARY
[facility] Surgical - Periop Services  Discharge Note  Short Stay    Procedure(s) (LRB):  INJECTION, SPINE, LUMBOSACRAL, TRANSFORAMINAL APPROACH / TFESI Left L4 & L5 (Left)      OUTCOME: Patient tolerated treatment/procedure well without complication and is now ready for discharge.    DISPOSITION: Home or Self Care    FINAL DIAGNOSIS:  <principal problem not specified>    FOLLOWUP: In clinic    DISCHARGE INSTRUCTIONS:  No discharge procedures on file.     TIME SPENT ON DISCHARGE: 5 minutes

## 2025-06-17 VITALS
DIASTOLIC BLOOD PRESSURE: 80 MMHG | OXYGEN SATURATION: 97 % | BODY MASS INDEX: 24.81 KG/M2 | TEMPERATURE: 98 F | WEIGHT: 173.31 LBS | RESPIRATION RATE: 16 BRPM | SYSTOLIC BLOOD PRESSURE: 136 MMHG | HEIGHT: 70 IN | HEART RATE: 68 BPM

## 2025-06-23 ENCOUNTER — TELEPHONE (OUTPATIENT)
Facility: CLINIC | Age: 80
End: 2025-06-23
Payer: MEDICARE

## 2025-06-23 DIAGNOSIS — G89.29 CHRONIC BACK PAIN GREATER THAN 3 MONTHS DURATION: Primary | ICD-10-CM

## 2025-06-23 DIAGNOSIS — M51.362 DEGENERATION OF INTERVERTEBRAL DISC OF LUMBAR REGION WITH DISCOGENIC BACK PAIN AND LOWER EXTREMITY PAIN: ICD-10-CM

## 2025-06-23 DIAGNOSIS — M54.16 LUMBAR RADICULOPATHY: ICD-10-CM

## 2025-06-23 DIAGNOSIS — M54.9 CHRONIC BACK PAIN GREATER THAN 3 MONTHS DURATION: Primary | ICD-10-CM

## 2025-06-23 NOTE — TELEPHONE ENCOUNTER
Pts daughter called to give name of PT place   [facility] in [place] [address]  P:[phone]  F:[phone]

## 2025-07-02 NOTE — PROGRESS NOTES
[facility]    Subjective:     Chief Complaint: Post-op Evaluation (Post op procedure 6/16/25 C/o pain level 5, states procedure helped, Taking Tramadol PRN.)    Referred by: No ref. provider found     History of Present Illness: Suresh Aragon is a 80 y.o. male presents today as a postoperative follow-up appointment for pain associated with lumbar radiculopathy with lumbar disc displacement and chronic back pain for greater than three-month after completing a left L4 +L5 transforaminal epidural steroid injection on 06/19/2025.  Patient is accompanied with his girlfriend who is also a patient of ours.  He is a good historian.  He reports 50% relief of pain over all since receiving this left L4 and L5 transforaminal epidural steroid injection a couple of weeks ago.  He has excellent pain relief to his low back however he still feels pain to the left upper buttock and the left upper lateral region.  This is worse with walking and standing and doing activities.  His pain score today is a 5/10 on the NRS.  He currently takes tramadol as needed.  Review of his MRI lumbar spine also shows pertinent findings of a prior compression fracture.  He has not followed up with his primary physician to evaluate and treat osteoporosis .  He confirmed he would do this as well.  Patient was also agreeable to complete a bilateral lower extremity nerve conduction and EMG study to better define the etiology of his pain.  He is also interested in repeating physical therapy and adding dry needling hopefully manual dry needling to his treatment plan.  He has never tried gabapentin or Lyrica in his not interested in that at this time.  He is agreeable to come back in 3 weeks to go over the results of his EMG and nerve conduction study and in 6 weeks to go over the effectiveness of his physical therapy treatments.      HPI during last office visit with my supervising physician,  included the following:    is an  "80-year-old male who presents to clinic today for his initial consultation as a referral from his orthopedist. He complains of back pain radiating down the left lower extremity to the knee. He drove from [place] to [place] in March of this year to go on a cruise. When he got to [place], he could not walk. He had to get a wheelchair to get onto the cruise ship. He rested for about 30 minutes and then was better. After the cruise, his pain came back and was radiating down the left lower extremity to his knee. Prior to this, he was very active with dancing and working with horses. He denies any numbness or tingling or weakness in his leg. His right leg is fine. He states that the muscles in his lower back tightened up when he is walking. His pain is averaging 6 or 7/10 on the NRS. He takes Tylenol for relief. He tried some over-the-counter pain relief patches but they did not help.     Pertinent PMH: Atrial fibrillation, CAD, GERD, hyperlipidemia, hypertension,     Pertinent PSH:  CABG, appendectomy, angiogram with left heart catheterization, coronary stents (  (with cis), pacemaker    Vital signs:   Visit Vitals  /77 (BP Location: Right arm, Patient Position: Sitting)   Pulse 82   Ht 5' 10" (1.778 m)   Wt 79.4 kg (175 lb)   BMI 25.11 kg/m²      Vitals:    07/03/25 1341   PainSc:   5     Pain Disability Index (PDI): 33       Interventional Pain History  06/19/2025:  Left L4 +L5 TFESI    ROS: Back pain + left leg and knee pain         MRI lumbar spine 2025:  FINDINGS:  There are 5 non-rib-bearing lumbar type vertebral bodies.  There is mild degenerative retrolisthesis of L4 on L5 measuring 4 mm without additional spondylolisthesis or pars interarticularis fracture.  There is slight segmental kyphosis with apex at T12-L1 due to a mild chronic superior endplate compression fracture deformity of the L1 vertebral body.  There is approximately 25% anterior vertebral body height loss at L1.  The T11 " through S2 vertebral bodies are otherwise normal in morphology with no additional chronic or acute/subacute fracture.  There is no acute marrow signal abnormality or osseous lesion.  There is Modic type 2 degenerative endplate marrow change at L4-L5, most advanced to the right of midline.  The visualized distal thoracic spinal cord and conus are normal with the conus terminating at the mid L1 vertebral body level.  No acute paravertebral soft tissue abnormality is identified.  Moderate fatty atrophy of the lower lumbar posterior paravertebral musculature is noted.  A partially visualized simple appearing right posterior interpolar renal cyst measures 3 cm in greatest dimension with additional smaller cyst at the anterolateral right interpolar kidney.  Additional changes of the spine on a level by level basis are as follows:     T11-T12: Intervertebral disc desiccation and annular bulging without disc protrusion.  No spinal canal or neural foramen stenosis.     T12-L1: Moderate intervertebral disc desiccation and mild disc space narrowing.  A broad-based central disc protrusion measures 4 mm in AP depth and effaces the ventral thecal sac with abutment of the conus which is slightly displaced posteriorly.  There is mild spinal canal stenosis and no neural foramen stenosis.     L1-L2: Annular bulging without disc protrusion.  Mild right degenerative facet arthrosis and hypertrophy is also present.  No spinal canal, lateral recess, or neural foramen stenosis.     L2-L3: Intervertebral disc desiccation and mild circumferential disc bulging without protrusion.  Thin posterior peripheral annular fissure at the central and paracentral zones.  Moderate and symmetric bilateral degenerative facet arthrosis with associated mild bilateral facet joint hypertrophy and thin bilateral facet effusions.  No spinal canal, lateral recess, or neural foramen stenosis.     L3-L4: Intervertebral disc desiccation and mild-to-moderate disc  space narrowing which is most advanced posteriorly.  Moderate circumferential disc bulging without protrusion.  Advanced bilateral degenerative facet arthrosis and associated moderate to severe symmetric bilateral facet joint hypertrophy.  There are thin bilateral facet joint effusions and moderate ligamentum flavum thickening along with mild posterior epidural lipomatosis.  These changes cause severe spinal canal stenosis with narrowing of the midline AP thecal sac to 5 mm.  There is prominent crowding of the descending nerves and severe bilateral lateral recess stenosis.  There is no neural foramen stenosis.     L4-L5: Severe intervertebral disc desiccation and severe disc space narrowing which is most advanced posteriorly along with anterior endplate osteophyte formation.  The mild degenerative retrolisthesis and mild circumferential disc bulging moderately impinges upon the ventral thecal sac.  No disc protrusion.  There is also moderate bilateral degenerative facet arthrosis and hypertrophy which is symmetric with mild to moderate right greater than left ligamentum flavum thickening.  These changes cause mild to moderate spinal canal stenosis with the midline AP thecal sac measuring 8 mm.  There is partial effacement of both lateral recesses and there is also mild to moderate right neural foramen stenosis with only mild left foraminal narrowing.     L5-S1: Intervertebral disc desiccation and moderate posterior disc space narrowing.  Shallow disc bulging without protrusion.  Severe bilateral degenerative facet arthrosis and associated hypertrophy with thin bilateral facet joint effusions and mild ligamentum flavum thickening.  There is only mild spinal canal and bilateral lateral recess stenosis.  There is also mild to moderate bilateral neural foramen stenosis.  The visualized sacrum and sacroiliac joints are normal.     Impression:     Mild chronic anterior wedge compression fracture deformity of the L1  vertebral body with resulting slight segmental kyphosis at T12-L1.     4 mm degenerative retrolisthesis of L4 and no acute malalignment or acute/subacute fracture.     Diffuse lower thoracic and lumbar degenerative disc disease and degenerative facet arthrosis as detailed above with no acute pathology identified.     Lumbar spinal canal stenosis is most advanced and severe at L3-L4 along with severe bilateral lateral recess stenosis.  Additional mild to moderate spinal canal stenosis and bilateral lateral recess narrowing L4-L5.     Mild to moderate right and mild left neural foramen stenosis at L4-L5 and mild to moderate bilateral neural foramen stenosis at L5-S1.     Moderate fatty atrophy of the lower lumbar posterior paravertebral musculature.     Partially visualized right renal cysts.        Electronically signed by:Ion Miller  Date:                                            05/14/2025  Time:                                           09:06    Objective:        Physical Exam  General: Well developed; normal way A&O x 3; No anxiety/depression; NAD  Mental Status: Oriented to person, palce and time. Displays appropriate mood & affect.  Head: Norm cephalic and atraumatic  Neck:  No cervical paraspinal banding.  Full range of motion with lateral turning and cervical flexion +extension.  Eyes: normal conjunctiva, normal lids, normal pupils  ENT and mouth: normal external ear, nose, and no lesions noted on the lips.  Respiratory: Symmetrical, Unlabored. No dyspnea  CV: normal rhythm and rate. No peripheral edema.   Abdomen: Non-distended    Extremities:  Gen: No cyanosis or tenderness to palpation bilateral upper and lower extremities  Skin: Warm, pink, dry, no rashes, no lesions on the lumbar spine  Strength: 5/5 motor strength bilateral upper and lower extremities  ROM: Full ROM in bilateral knees and ankles without pain or instability.    Neuro:  Gait: no altalgic lean, normal toe and heel raise. Independent  ambulator.  Sensory: Intact to light touch bilateral  upper and lower extremities    Spine: Normal lordosis. No scoliosis  L-spine ROM: Full ROM to flexion, extension, bilateral rotation,   Straight Leg Raise:  Positive right, positive left  SI Joint: No tenderness to palpation bilaterally.  Negative VIANNEY, [place] and negative thigh thrust test on the left      Assessment:       Suresh Aragon is a 80 y.o. male presents today as a postoperative follow-up appointment for pain associated with lumbar radiculopathy with lumbar disc displacement and chronic back pain for greater than three-month after completing a left L4 +L5 transforaminal epidural steroid injection on 06/19/2025.  Patient is accompanied with his girlfriend who is also a patient of ours.  He is a good historian.  He reports 50% relief of pain over all since receiving this left L4 and L5 transforaminal epidural steroid injection a couple of weeks ago.  He has excellent pain relief to his low back however he still feels pain to the left upper buttock and the left upper lateral region.  This is worse with walking and standing and doing activities.  His pain score today is a 5/10 on the NRS.  He currently takes tramadol as needed.  Review of his MRI lumbar spine also shows pertinent findings of a prior compression fracture.  He has not followed up with his primary physician to evaluate and treat osteoporosis .  He confirmed he would do this as well.  Patient was also agreeable to complete a bilateral lower extremity nerve conduction and EMG study to better define the etiology of his pain.  He is also interested in repeating physical therapy and adding dry needling hopefully manual dry needling to his treatment plan.  He has never tried gabapentin or Lyrica in his not interested in that at this time.  He is agreeable to come back in 3 weeks to go over the results of his EMG and nerve conduction study and in 6 weeks to go over the effectiveness of his  physical therapy treatments.    Plan of care:   Order placed for EMG and nerve conduction study bilateral lower extremities with Dr. Berry   Patient to follow back up with me in 3 weeks to go over the results and plan of care   Order placed for physical therapy 6-8 weeks.  Request sent to staff to schedule patient back with me in 6 weeks to evaluate the effectiveness.    Request was given to patient to follow-up with his primary physician regarding history of compression fractures and secondary prevention efforts to treat this and avoid future fractures.  He verified understanding.    Encounter Diagnoses   Name Primary?    Lumbar radiculopathy Yes    Lumbar disc displacement without myelopathy     Chronic back pain greater than 3 months duration          Plan:       Suresh was seen today for post-op evaluation.    Diagnoses and all orders for this visit:    Lumbar radiculopathy    Lumbar disc displacement without myelopathy    Chronic back pain greater than 3 months duration           Past Medical History:   Diagnosis Date    A-fib     CAD (coronary artery disease)     GERD (gastroesophageal reflux disease)     HLD (hyperlipidemia)     HTN (hypertension)        Past Surgical History:   Procedure Laterality Date    ANGIOGRAM, CORONARY, WITH LEFT HEART CATHETERIZATION N/A 12/02/2022    Procedure: Angiogram, Coronary, with Left Heart Cath;  Surgeon: Candido Martinez MD;  Location: [facility] CATH LAB;  Service: Cardiology;  Laterality: N/A;    APPENDECTOMY  1951    None    CORONARY ARTERY BYPASS GRAFT      CORONARY ARTERY BYPASS GRAFT (CABG) N/A 12/05/2022    Procedure: CORONARY ARTERY BYPASS GRAFT (CABG);  Surgeon: Vini Ricks MD;  Location: [facility] OR;  Service: Cardiothoracic;  Laterality: N/A;  Case #3    EYE SURGERY  1995    Rt eye    INJECTION, SPINE, LUMBOSACRAL, TRANSFORAMINAL APPROACH Left 6/16/2025    Procedure: INJECTION, SPINE, LUMBOSACRAL, TRANSFORAMINAL APPROACH / TFESI Left L4 & L5;  Surgeon: Sammi Rees  MD NAIMA;  Location: [facility] OR;  Service: Pain Management;  Laterality: Left;  TFESI Lt L4 & L5    INSERT / REPLACE / REMOVE PACEMAKER      INSERTION OF PACEMAKER N/A 12/08/2022    Procedure: INSERTION, CARDIAC PACEMAKER;  Surgeon: Vini Ricks MD;  Location: [facility] OR;  Service: Cardiology;  Laterality: N/A;  INSERTION OF PERM PACEMAKER //    REP - PAT    LEFT HEART CATHETERIZATION Left 07/21/2023    Procedure: Left heart cath;  Surgeon: Candido Martinez MD;  Location: [facility] CATH LAB;  Service: Cardiology;  Laterality: Left;  LHC VIA RT GROIN    LEFT HEART CATHETERIZATION Left 09/26/2023    Procedure: Left heart cath;  Surgeon: Candido Martinez MD;  Location: [facility] CATH LAB;  Service: Cardiology;  Laterality: Left;  LHC VIA RT GROIN NATIVE RCA INTERVENTION    LEFT HEART CATHETERIZATION Left 08/20/2024    Procedure: Left heart cath;  Surgeon: Candido Martinez MD;  Location: [facility] CATH LAB;  Service: Cardiology;  Laterality: Left;  LHC VIA RT GROIN    PROSTATE SURGERY      STENT, DRUG ELUTING, MULTI VESSEL, CORONARY  09/26/2023    Procedure: Stent, Drug Eluting, Multi Vessel, Coronary;  Surgeon: Candido Martinez MD;  Location: [facility] CATH LAB;  Service: Cardiology;;    TONSILLECTOMY         Family History   Problem Relation Name Age of Onset    Asthma Mother Jenny Aragon     Diabetes Mother Jenny Aragon        Social History     Socioeconomic History    Marital status:    Tobacco Use    Smoking status: Never    Smokeless tobacco: Never   Vaping Use    Vaping status: Never Used   Substance and Sexual Activity    Alcohol use: Never    Drug use: Never    Sexual activity: Not Currently     Partners: Female     Social Drivers of Health     Financial Resource Strain: Low Risk  (12/5/2022)    Overall Financial Resource Strain (CARDIA)     Difficulty of Paying Living Expenses: Not hard at all   Food Insecurity: No Food Insecurity (12/5/2022)    Hunger Vital Sign     Worried About Running Out of Food in the Last Year:  Never true     Ran Out of Food in the Last Year: Never true   Transportation Needs: No Transportation Needs (12/5/2022)    PRAPARE - Transportation     Lack of Transportation (Medical): No     Lack of Transportation (Non-Medical): No   Physical Activity: Sufficiently Active (3/20/2024)    [facility]    Exercise Vital Sign     Days of Exercise per Week: 5 days     Minutes of Exercise per Session: 50 min   Housing Stability: Unknown (12/5/2022)    Housing Stability Vital Sign     Unable to Pay for Housing in the Last Year: No       Current Medications[1]    Review of patient's allergies indicates:   Allergen Reactions    Levofloxacin     Lisinopril      Other Reaction(s): Angioedema    Diphenhydramine      chills                  [1]   Current Outpatient Medications   Medication Sig Dispense Refill    apixaban (ELIQUIS) 5 mg Tab Take 1 tablet (5 mg total) by mouth 2 (two) times daily. 60 tablet 11    desvenlafaxine succinate (PRISTIQ) 50 MG Tb24 Take 50 mg by mouth once daily.      eszopiclone (LUNESTA) 3 mg Tab Take 3 mg by mouth.      ezetimibe (ZETIA) 10 mg tablet Take 10 mg by mouth.      famotidine (PEPCID) 40 MG tablet Take 40 mg by mouth every evening.      nitroGLYCERIN (NITROSTAT) 0.4 MG SL tablet Place 0.4 mg under the tongue every 5 (five) minutes as needed for Chest pain.      omeprazole (PRILOSEC) 40 MG capsule Take 40 mg by mouth.      tadalafiL (CIALIS) 5 MG tablet Take 5 mg by mouth daily as needed.      traMADoL (ULTRAM) 50 mg tablet Take 1 tablet (50 mg total) by mouth 3 (three) times daily as needed for Pain. 20 each 0    traMADoL (ULTRAM) 50 mg tablet Take 1 tablet (50 mg total) by mouth 2 (two) times daily as needed for Pain. 50 each 0    valsartan (DIOVAN) 80 MG tablet Take 80 mg by mouth.      zaleplon (SONATA) 5 MG Cap Take 5 mg by mouth nightly as needed.      atorvastatin (LIPITOR) 40 MG tablet Take 1  tablet (40 mg total) by mouth once daily. 90 tablet 3    clopidogreL (PLAVIX) 75 mg tablet Take 1 tablet (75 mg total) by mouth once daily. 30 tablet 11    metoprolol succinate (TOPROL-XL) 25 MG 24 hr tablet Take 1 tablet (25 mg total) by mouth once daily. 30 tablet 11    sertraline (ZOLOFT) 50 MG tablet Take by mouth.       No current facility-administered medications for this visit.     Facility-Administered Medications Ordered in Other Visits   Medication Dose Route Frequency Provider Last Rate Last Admin    0.9%  NaCl infusion   Intravenous Once Candido Martinez MD

## 2025-07-03 ENCOUNTER — OFFICE VISIT (OUTPATIENT)
Facility: CLINIC | Age: 80
End: 2025-07-03
Payer: MEDICARE

## 2025-07-03 VITALS
HEIGHT: 70 IN | BODY MASS INDEX: 25.05 KG/M2 | WEIGHT: 175 LBS | SYSTOLIC BLOOD PRESSURE: 129 MMHG | DIASTOLIC BLOOD PRESSURE: 77 MMHG | HEART RATE: 82 BPM

## 2025-07-03 DIAGNOSIS — M51.26 LUMBAR DISC DISPLACEMENT WITHOUT MYELOPATHY: ICD-10-CM

## 2025-07-03 DIAGNOSIS — M54.16 LUMBAR RADICULOPATHY: Primary | ICD-10-CM

## 2025-07-03 DIAGNOSIS — G89.29 CHRONIC BACK PAIN GREATER THAN 3 MONTHS DURATION: ICD-10-CM

## 2025-07-03 DIAGNOSIS — M54.9 CHRONIC BACK PAIN GREATER THAN 3 MONTHS DURATION: ICD-10-CM

## 2025-07-03 RX ORDER — EZETIMIBE 10 MG/1
10 TABLET ORAL
COMMUNITY
Start: 2025-06-27

## 2025-07-03 RX ORDER — ESZOPICLONE 3 MG/1
3 TABLET, FILM COATED ORAL
COMMUNITY
Start: 2025-06-18 | End: 2026-06-18

## 2025-07-03 RX ORDER — VALSARTAN 80 MG/1
80 TABLET ORAL
COMMUNITY
Start: 2025-06-27

## 2025-07-24 ENCOUNTER — OFFICE VISIT (OUTPATIENT)
Facility: CLINIC | Age: 80
End: 2025-07-24
Payer: MEDICARE

## 2025-07-24 VITALS
HEART RATE: 68 BPM | BODY MASS INDEX: 25.06 KG/M2 | SYSTOLIC BLOOD PRESSURE: 132 MMHG | HEIGHT: 70 IN | WEIGHT: 175.06 LBS | OXYGEN SATURATION: 96 % | DIASTOLIC BLOOD PRESSURE: 79 MMHG

## 2025-07-24 DIAGNOSIS — G89.29 CHRONIC BACK PAIN GREATER THAN 3 MONTHS DURATION: Primary | ICD-10-CM

## 2025-07-24 DIAGNOSIS — M54.16 LUMBAR RADICULOPATHY: ICD-10-CM

## 2025-07-24 DIAGNOSIS — M54.9 CHRONIC BACK PAIN GREATER THAN 3 MONTHS DURATION: Primary | ICD-10-CM

## 2025-07-24 DIAGNOSIS — M51.26 LUMBAR DISC DISPLACEMENT WITHOUT MYELOPATHY: ICD-10-CM

## 2025-07-24 PROCEDURE — 1159F MED LIST DOCD IN RCRD: CPT | Mod: CPTII,,, | Performed by: NURSE PRACTITIONER

## 2025-07-24 PROCEDURE — 1101F PT FALLS ASSESS-DOCD LE1/YR: CPT | Mod: CPTII,,, | Performed by: NURSE PRACTITIONER

## 2025-07-24 PROCEDURE — 3078F DIAST BP <80 MM HG: CPT | Mod: CPTII,,, | Performed by: NURSE PRACTITIONER

## 2025-07-24 PROCEDURE — 99214 OFFICE O/P EST MOD 30 MIN: CPT | Mod: ,,, | Performed by: NURSE PRACTITIONER

## 2025-07-24 PROCEDURE — 3288F FALL RISK ASSESSMENT DOCD: CPT | Mod: CPTII,,, | Performed by: NURSE PRACTITIONER

## 2025-07-24 PROCEDURE — 1160F RVW MEDS BY RX/DR IN RCRD: CPT | Mod: CPTII,,, | Performed by: NURSE PRACTITIONER

## 2025-07-24 PROCEDURE — 1125F AMNT PAIN NOTED PAIN PRSNT: CPT | Mod: CPTII,,, | Performed by: NURSE PRACTITIONER

## 2025-07-24 PROCEDURE — 3075F SYST BP GE 130 - 139MM HG: CPT | Mod: CPTII,,, | Performed by: NURSE PRACTITIONER

## 2025-07-24 NOTE — PROGRESS NOTES
[facility]    Subjective:     Chief Complaint: Leg Pain (Left leg Pain is above the knee.  Pain Scale: 4/10.  Patient presents today to review EMG and nerve conduction study results.  Patient is currently in PT and has completed 2 sessions with mild benefit.   Patient takes OTC Aleve for pain.)    Referred by: No ref. provider found     History of Present Illness: Suresh Aragon is a 80 y.o. male presents today to go over the results of his bilateral lower extremity EMG and nerve conduction study on 07/09/2025.  Results showed all motor and sensory responses were normal.  There was also no electrodiagnostic .  Patient is accompanied by his girlfriend who is also a patient of Dr. Cindy kelly.  Unfortunately he just started physical therapy so he does not have a lot of sessions that has been completed to really give an accurate description of his pain relief.  He plans on doing more the physical therapy exercises and also talk with the physical therapist to see what his her opinion in his diagnostically as well.  He does have some issues that could be a pain generator in the L 4 and L5 region in his lumbar spine.  Additionally he reports a proximally 1 month of pain relief when he received his left L4 and L5 transforaminal epidural steroid injection.  During the visit he had negative findings with his VIANNEY and thigh thrust on the left however when I completed that test on the right it irritated his left SI joint th region .  His EMG and nerve conduction study showed negative findings.  Patient plans on trying to get 1-2 months of physical therapy until his follow-up appointment in September to see if he can get some improvement.  He is not interested in anything else at this time and will follow up with me in September to re-evaluate this pain.  Patient also confirmed he reached out to his primary physician about his history of a compression fracture to make sure he did not have osteoporosis.  He did  this in his working with his primary physician to avoid secondary problems.  He is contemplating taking Fosamax in his also taking vitamin-D and calcium per his primary.    History of Present Illness during last office visit with me on 07/03/2025 included the following:   : Suresh Aragon is a 80 y.o. male presents today as a postoperative follow-up appointment for pain associated with lumbar radiculopathy with lumbar disc displacement and chronic back pain for greater than three-month after completing a left L4 +L5 transforaminal epidural steroid injection on 06/19/2025.  Patient is accompanied with his girlfriend who is also a patient of ours.  He is a good historian.  He reports 50% relief of pain over all since receiving this left L4 and L5 transforaminal epidural steroid injection a couple of weeks ago.  He has excellent pain relief to his low back however he still feels pain to the left upper buttock and the left upper lateral region.  This is worse with walking and standing and doing activities.  His pain score today is a 5/10 on the NRS.  He currently takes tramadol as needed.  Review of his MRI lumbar spine also shows pertinent findings of a prior compression fracture.  He has not followed up with his primary physician to evaluate and treat osteoporosis .  He confirmed he would do this as well.  Patient was also agreeable to complete a bilateral lower extremity nerve conduction and EMG study to better define the etiology of his pain.  He is also interested in repeating physical therapy and adding dry needling hopefully manual dry needling to his treatment plan.  He has never tried gabapentin or Lyrica in his not interested in that at this time.  He is agreeable to come back in 3 weeks to go over the results of his EMG and nerve conduction study and in 6 weeks to go over the effectiveness of his physical therapy treatments. evidence of neuropathy.    Vital signs:   Visit Vitals  /79 (BP Location: Right  "arm, Patient Position: Sitting)   Pulse 68   Ht 5' 10" (1.778 m)   Wt 79.4 kg (175 lb 0.7 oz)   SpO2 96%   BMI 25.12 kg/m²      Vitals:    07/24/25 1348   PainSc:   4   PainLoc: Leg     Pain Disability Index (PDI): 31       Interventional Pain History  06/19/2025: Left L4 +L5 TFESI (pain relief for about a one-month)    ROS back + left leg and knee pain    Bilateral lower extremity EMG/NCV 2025:   Normal study with no electrodiagnostic evidence of neuropathy and no motor or sensory issues.         MRI lumbar spine 2025:  FINDINGS:  There are 5 non-rib-bearing lumbar type vertebral bodies.  There is mild degenerative retrolisthesis of L4 on L5 measuring 4 mm without additional spondylolisthesis or pars interarticularis fracture.  There is slight segmental kyphosis with apex at T12-L1 due to a mild chronic superior endplate compression fracture deformity of the L1 vertebral body.  There is approximately 25% anterior vertebral body height loss at L1.  The T11 through S2 vertebral bodies are otherwise normal in morphology with no additional chronic or acute/subacute fracture.  There is no acute marrow signal abnormality or osseous lesion.  There is Modic type 2 degenerative endplate marrow change at L4-L5, most advanced to the right of midline.  The visualized distal thoracic spinal cord and conus are normal with the conus terminating at the mid L1 vertebral body level.  No acute paravertebral soft tissue abnormality is identified.  Moderate fatty atrophy of the lower lumbar posterior paravertebral musculature is noted.  A partially visualized simple appearing right posterior interpolar renal cyst measures 3 cm in greatest dimension with additional smaller cyst at the anterolateral right interpolar kidney.  Additional changes of the spine on a level by level basis are as follows:     T11-T12: Intervertebral disc desiccation and annular bulging without disc protrusion.  No spinal canal or neural foramen stenosis.   "   T12-L1: Moderate intervertebral disc desiccation and mild disc space narrowing.  A broad-based central disc protrusion measures 4 mm in AP depth and effaces the ventral thecal sac with abutment of the conus which is slightly displaced posteriorly.  There is mild spinal canal stenosis and no neural foramen stenosis.     L1-L2: Annular bulging without disc protrusion.  Mild right degenerative facet arthrosis and hypertrophy is also present.  No spinal canal, lateral recess, or neural foramen stenosis.     L2-L3: Intervertebral disc desiccation and mild circumferential disc bulging without protrusion.  Thin posterior peripheral annular fissure at the central and paracentral zones.  Moderate and symmetric bilateral degenerative facet arthrosis with associated mild bilateral facet joint hypertrophy and thin bilateral facet effusions.  No spinal canal, lateral recess, or neural foramen stenosis.     L3-L4: Intervertebral disc desiccation and mild-to-moderate disc space narrowing which is most advanced posteriorly.  Moderate circumferential disc bulging without protrusion.  Advanced bilateral degenerative facet arthrosis and associated moderate to severe symmetric bilateral facet joint hypertrophy.  There are thin bilateral facet joint effusions and moderate ligamentum flavum thickening along with mild posterior epidural lipomatosis.  These changes cause severe spinal canal stenosis with narrowing of the midline AP thecal sac to 5 mm.  There is prominent crowding of the descending nerves and severe bilateral lateral recess stenosis.  There is no neural foramen stenosis.     L4-L5: Severe intervertebral disc desiccation and severe disc space narrowing which is most advanced posteriorly along with anterior endplate osteophyte formation.  The mild degenerative retrolisthesis and mild circumferential disc bulging moderately impinges upon the ventral thecal sac.  No disc protrusion.  There is also moderate bilateral  degenerative facet arthrosis and hypertrophy which is symmetric with mild to moderate right greater than left ligamentum flavum thickening.  These changes cause mild to moderate spinal canal stenosis with the midline AP thecal sac measuring 8 mm.  There is partial effacement of both lateral recesses and there is also mild to moderate right neural foramen stenosis with only mild left foraminal narrowing.     L5-S1: Intervertebral disc desiccation and moderate posterior disc space narrowing.  Shallow disc bulging without protrusion.  Severe bilateral degenerative facet arthrosis and associated hypertrophy with thin bilateral facet joint effusions and mild ligamentum flavum thickening.  There is only mild spinal canal and bilateral lateral recess stenosis.  There is also mild to moderate bilateral neural foramen stenosis.  The visualized sacrum and sacroiliac joints are normal.     Impression:     Mild chronic anterior wedge compression fracture deformity of the L1 vertebral body with resulting slight segmental kyphosis at T12-L1.     4 mm degenerative retrolisthesis of L4 and no acute malalignment or acute/subacute fracture.     Diffuse lower thoracic and lumbar degenerative disc disease and degenerative facet arthrosis as detailed above with no acute pathology identified.     Lumbar spinal canal stenosis is most advanced and severe at L3-L4 along with severe bilateral lateral recess stenosis.  Additional mild to moderate spinal canal stenosis and bilateral lateral recess narrowing L4-L5.     Mild to moderate right and mild left neural foramen stenosis at L4-L5 and mild to moderate bilateral neural foramen stenosis at L5-S1.     Moderate fatty atrophy of the lower lumbar posterior paravertebral musculature.     Partially visualized right renal cysts.        Electronically signed by:Ion Miller  Date:                                            05/14/2025  Time:                                            09:06    Objective:        Physical Exam  General: Well developed; normal way A&O x 3; No anxiety/depression; NAD  Mental Status: Oriented to person, palce and time. Displays appropriate mood & affect.  Head: Norm cephalic and atraumatic  Neck:  No cervical paraspinal banding.  Full range of motion with lateral turning and cervical flexion +extension.  Eyes: normal conjunctiva, normal lids, normal pupils  ENT and mouth: normal external ear, nose, and no lesions noted on the lips.  Respiratory: Symmetrical, Unlabored. No dyspnea  CV: normal rhythm and rate. No peripheral edema.   Abdomen: Non-distended     Extremities:  Gen: No cyanosis or tenderness to palpation bilateral upper and lower extremities  Skin: Warm, pink, dry, no rashes, no lesions on the lumbar spine  Strength: 5/5 motor strength bilateral upper and lower extremities  ROM: Full ROM in bilateral knees and ankles without pain or instability.     Neuro:  Gait: no altalgic lean, normal toe and heel raise. Independent ambulator.  Sensory: Intact to light touch bilateral  upper and lower extremities     Spine: Normal lordosis. No scoliosis  L-spine ROM: Full ROM to flexion, extension, bilateral rotation,   Straight Leg Raise:  Positive right, positive right (caused pain to his left SI joint only)  SI Joint: No tenderness to palpation bilaterally.  Negative VIANNEY, [place] and negative thigh thrust test on the left      Assessment:     Suresh Aragon is a 80 y.o. male presents today to go over the results of his bilateral lower extremity EMG and nerve conduction study on 07/09/2025.  Results showed all motor and sensory responses were normal.  There was also no electrodiagnostic .  Patient is accompanied by his girlfriend who is also a patient of Dr. Cindy kelly.  Unfortunately he just started physical therapy so he does not have a lot of sessions that has been completed to really give an accurate description of his pain relief.  He plans on doing more the  physical therapy exercises and also talk with the physical therapist to see what his her opinion in his diagnostically as well.  He does have some issues that could be a pain generator in the L 4 and L5 region in his lumbar spine.  Additionally he reports a proximally 1 month of pain relief when he received his left L4 and L5 transforaminal epidural steroid injection.  During the visit he had negative findings with his VIANNEY and thigh thrust on the left however when I completed that test on the right it irritated his left SI joint th region .  His EMG and nerve conduction study showed negative findings.  Patient plans on trying to get 1-2 months of physical therapy until his follow-up appointment in September to see if he can get some improvement.  He is not interested in anything else at this time and will follow up with me in September to re-evaluate this pain.  Patient also confirmed he reached out to his primary physician about his history of a compression fracture to make sure he did not have osteoporosis.  He did this in his working with his primary physician to avoid secondary problems.  He is contemplating taking Fosamax in his also taking vitamin-D and calcium per his primary.      Plan of care   Patient to follow-up with me in September to re-evaluate his back pain and left lateral upper leg pain  Future considerations would be to repeat this same epidural steroid injection on the left L4 and L5 as he received a couple of months ago.    Encounter Diagnoses   Name Primary?    Chronic back pain greater than 3 months duration Yes    Lumbar disc displacement without myelopathy     Lumbar radiculopathy          Plan:       Suresh was seen today for leg pain.    Diagnoses and all orders for this visit:    Chronic back pain greater than 3 months duration    Lumbar disc displacement without myelopathy    Lumbar radiculopathy           Past Medical History:   Diagnosis Date    A-fib     CAD (coronary artery  disease)     GERD (gastroesophageal reflux disease)     HLD (hyperlipidemia)     HTN (hypertension)        Past Surgical History:   Procedure Laterality Date    ANGIOGRAM, CORONARY, WITH LEFT HEART CATHETERIZATION N/A 12/02/2022    Procedure: Angiogram, Coronary, with Left Heart Cath;  Surgeon: Candido Martinez MD;  Location: [facility] CATH LAB;  Service: Cardiology;  Laterality: N/A;    APPENDECTOMY  1951    None    CORONARY ARTERY BYPASS GRAFT      CORONARY ARTERY BYPASS GRAFT (CABG) N/A 12/05/2022    Procedure: CORONARY ARTERY BYPASS GRAFT (CABG);  Surgeon: Vini Ricks MD;  Location: [facility];  Service: Cardiothoracic;  Laterality: N/A;  Case #3    EYE SURGERY  1995    Rt eye    INJECTION, SPINE, LUMBOSACRAL, TRANSFORAMINAL APPROACH Left 6/16/2025    Procedure: INJECTION, SPINE, LUMBOSACRAL, TRANSFORAMINAL APPROACH / TFESI Left L4 & L5;  Surgeon: Sammi Rees MD;  Location: [facility] OR;  Service: Pain Management;  Laterality: Left;  TFESI Lt L4 & L5    INSERT / REPLACE / REMOVE PACEMAKER      INSERTION OF PACEMAKER N/A 12/08/2022    Procedure: INSERTION, CARDIAC PACEMAKER;  Surgeon: Vini Ricks MD;  Location: [facility] OR;  Service: Cardiology;  Laterality: N/A;  INSERTION OF PERM PACEMAKER //    REP - PAT    LEFT HEART CATHETERIZATION Left 07/21/2023    Procedure: Left heart cath;  Surgeon: Candido Martinez MD;  Location: [facility] CATH LAB;  Service: Cardiology;  Laterality: Left;  LHC VIA RT GROIN    LEFT HEART CATHETERIZATION Left 09/26/2023    Procedure: Left heart cath;  Surgeon: Candido Martinez MD;  Location: [facility] CATH LAB;  Service: Cardiology;  Laterality: Left;  LHC VIA RT GROIN NATIVE RCA INTERVENTION    LEFT HEART CATHETERIZATION Left 08/20/2024    Procedure: Left heart cath;  Surgeon: Candido Martinez MD;  Location: [facility] CATH LAB;  Service: Cardiology;  Laterality: Left;  LHC VIA RT GROIN    PROSTATE SURGERY      STENT, DRUG ELUTING, MULTI VESSEL, CORONARY  09/26/2023    Procedure: Stent, Drug  Eluting, Multi Vessel, Coronary;  Surgeon: Candido Martinez MD;  Location: [facility] CATH LAB;  Service: Cardiology;;    TONSILLECTOMY         Family History   Problem Relation Name Age of Onset    Asthma Mother Jenny Aragon     Diabetes Mother Jenny Aragon        Social History     Socioeconomic History    Marital status:    Tobacco Use    Smoking status: Never    Smokeless tobacco: Never   Vaping Use    Vaping status: Never Used   Substance and Sexual Activity    Alcohol use: Never    Drug use: Never    Sexual activity: Not Currently     Partners: Female     Social Drivers of Health     Financial Resource Strain: Low Risk  (12/5/2022)    Overall Financial Resource Strain (CARDIA)     Difficulty of Paying Living Expenses: Not hard at all   Food Insecurity: No Food Insecurity (12/5/2022)    Hunger Vital Sign     Worried About Running Out of Food in the Last Year: Never true     Ran Out of Food in the Last Year: Never true   Transportation Needs: No Transportation Needs (12/5/2022)    PRAPARE - Transportation     Lack of Transportation (Medical): No     Lack of Transportation (Non-Medical): No   Physical Activity: Inactive (7/7/2025)    Received from [facility]    Exercise Vital Sign     Days of Exercise per Week: 0 days     Minutes of Exercise per Session: 0 min   Housing Stability: Unknown (12/5/2022)    Housing Stability Vital Sign     Unable to Pay for Housing in the Last Year: No       Current Medications[1]    Review of patient's allergies indicates:   Allergen Reactions    Levofloxacin     Lisinopril      Other Reaction(s): Angioedema    Diphenhydramine      chills                  [1]   Current Outpatient Medications   Medication Sig Dispense Refill    apixaban (ELIQUIS) 5 mg Tab Take 1 tablet (5 mg total) by mouth 2 (two) times daily. 60 tablet 11    desvenlafaxine succinate (PRISTIQ) 50 MG Tb24 Take 50 mg by mouth once daily.       eszopiclone (LUNESTA) 3 mg Tab Take 3 mg by mouth.      ezetimibe (ZETIA) 10 mg tablet Take 10 mg by mouth.      famotidine (PEPCID) 40 MG tablet Take 40 mg by mouth every evening.      omeprazole (PRILOSEC) 40 MG capsule Take 40 mg by mouth.      tadalafiL (CIALIS) 5 MG tablet Take 5 mg by mouth daily as needed.      traMADoL (ULTRAM) 50 mg tablet Take 1 tablet (50 mg total) by mouth 2 (two) times daily as needed for Pain. 50 each 0    valsartan (DIOVAN) 80 MG tablet Take 80 mg by mouth.      zaleplon (SONATA) 5 MG Cap Take 5 mg by mouth nightly as needed.      atorvastatin (LIPITOR) 40 MG tablet Take 1 tablet (40 mg total) by mouth once daily. 90 tablet 3    clopidogreL (PLAVIX) 75 mg tablet Take 1 tablet (75 mg total) by mouth once daily. 30 tablet 11    metoprolol succinate (TOPROL-XL) 25 MG 24 hr tablet Take 1 tablet (25 mg total) by mouth once daily. 30 tablet 11    nitroGLYCERIN (NITROSTAT) 0.4 MG SL tablet Place 0.4 mg under the tongue every 5 (five) minutes as needed for Chest pain.      sertraline (ZOLOFT) 50 MG tablet Take by mouth.      traMADoL (ULTRAM) 50 mg tablet Take 1 tablet (50 mg total) by mouth 3 (three) times daily as needed for Pain. 20 each 0     No current facility-administered medications for this visit.     Facility-Administered Medications Ordered in Other Visits   Medication Dose Route Frequency Provider Last Rate Last Admin    0.9%  NaCl infusion   Intravenous Once Candido Martinez MD

## 2025-08-26 ENCOUNTER — OFFICE VISIT (OUTPATIENT)
Facility: CLINIC | Age: 80
End: 2025-08-26
Payer: MEDICARE

## 2025-08-26 VITALS
OXYGEN SATURATION: 97 % | WEIGHT: 175 LBS | SYSTOLIC BLOOD PRESSURE: 127 MMHG | HEART RATE: 60 BPM | HEIGHT: 70 IN | DIASTOLIC BLOOD PRESSURE: 72 MMHG | BODY MASS INDEX: 25.05 KG/M2

## 2025-08-26 DIAGNOSIS — M54.16 LUMBAR RADICULOPATHY: ICD-10-CM

## 2025-08-26 DIAGNOSIS — M51.26 LUMBAR DISC DISPLACEMENT WITHOUT MYELOPATHY: ICD-10-CM

## 2025-08-26 DIAGNOSIS — M54.9 CHRONIC BACK PAIN GREATER THAN 3 MONTHS DURATION: ICD-10-CM

## 2025-08-26 DIAGNOSIS — G89.29 CHRONIC BACK PAIN GREATER THAN 3 MONTHS DURATION: ICD-10-CM

## 2025-08-26 DIAGNOSIS — M53.3 SACROILIAC JOINT PAIN: Primary | ICD-10-CM

## 2025-08-26 PROCEDURE — 99215 OFFICE O/P EST HI 40 MIN: CPT | Mod: ,,, | Performed by: NURSE PRACTITIONER

## 2025-08-26 PROCEDURE — 1159F MED LIST DOCD IN RCRD: CPT | Mod: CPTII,,, | Performed by: NURSE PRACTITIONER

## 2025-08-26 PROCEDURE — 3288F FALL RISK ASSESSMENT DOCD: CPT | Mod: CPTII,,, | Performed by: NURSE PRACTITIONER

## 2025-08-26 PROCEDURE — 1125F AMNT PAIN NOTED PAIN PRSNT: CPT | Mod: CPTII,,, | Performed by: NURSE PRACTITIONER

## 2025-08-26 PROCEDURE — 1101F PT FALLS ASSESS-DOCD LE1/YR: CPT | Mod: CPTII,,, | Performed by: NURSE PRACTITIONER

## 2025-08-26 PROCEDURE — 3074F SYST BP LT 130 MM HG: CPT | Mod: CPTII,,, | Performed by: NURSE PRACTITIONER

## 2025-08-26 PROCEDURE — 1160F RVW MEDS BY RX/DR IN RCRD: CPT | Mod: CPTII,,, | Performed by: NURSE PRACTITIONER

## 2025-08-26 PROCEDURE — 3078F DIAST BP <80 MM HG: CPT | Mod: CPTII,,, | Performed by: NURSE PRACTITIONER

## 2025-09-02 ENCOUNTER — TELEPHONE (OUTPATIENT)
Facility: CLINIC | Age: 80
End: 2025-09-02
Payer: MEDICARE

## (undated) DEVICE — BANDAGE ADHESIVE FABRIC 2X4

## (undated) DEVICE — CATH EMERGE MR 15 X 2.00

## (undated) DEVICE — GOWN SMARTSLEEVE XXL/XLONG

## (undated) DEVICE — Device

## (undated) DEVICE — PAD DEFIB CADENCE ADULT R2

## (undated) DEVICE — NDL FLTR 5MCRN BLNT TIP 18GX1

## (undated) DEVICE — DRAIN CHEST WATER SEAL

## (undated) DEVICE — CATH IMPULSE FL4 5FR 100CM

## (undated) DEVICE — DRAPE MEDIUM SHEET 40X70IN

## (undated) DEVICE — ELECTRODE PATIENT RETURN DISP

## (undated) DEVICE — DRESSING TRANS 4X4 TEGADERM

## (undated) DEVICE — SET SMARTSITE EXT SMALLBORE NF

## (undated) DEVICE — DRESSING TEGADERM 4.4X5IN

## (undated) DEVICE — BAG MEDI-PLAST DECANTER C-FLOW

## (undated) DEVICE — CATH ALL PUR URTHL 20FR

## (undated) DEVICE — CONTRAST ISOVUE M 200 20ML VIL

## (undated) DEVICE — SOL PLASMALYTE PH 7.4 1000ML

## (undated) DEVICE — SUT 2 30IN SILK BLK BRAIDE

## (undated) DEVICE — KIT CATHGARD DBL LUMN 9FRX11.5

## (undated) DEVICE — SET ANGIO ACIST CVI ANGIOTOUCH

## (undated) DEVICE — SENSOR LOW LEVEL OXYGEN

## (undated) DEVICE — KIT C.A.T.S. FAST START 4/CASE

## (undated) DEVICE — SUT CTD VICRYL CT-1 27

## (undated) DEVICE — PAD HEARTSTART DEFIB ADULT

## (undated) DEVICE — CATH NC EMERGE MR 2.5X20MM

## (undated) DEVICE — GLOVE SIGNATURE MICRO LTX 6.5

## (undated) DEVICE — GLOVE PROTEXIS HYDROGEL SZ6.5

## (undated) DEVICE — MARKER ANASTOMARK COR FLX

## (undated) DEVICE — SUT PROLENE 7-0 BV-1 30 BLU

## (undated) DEVICE — KIT SURGICAL TURNOVER

## (undated) DEVICE — BLANKET HYPER ADULT 24X60IN

## (undated) DEVICE — BLADE SCALP OPHTL BEVEL STR

## (undated) DEVICE — SUT PROLENE 7-0 BV175-6

## (undated) DEVICE — INSERT INTRACK CLAMP ULT 66MM

## (undated) DEVICE — NDL ASPIRATOR AIR 16G

## (undated) DEVICE — HOLDER STRIP-T SELF ADH 2X10IN

## (undated) DEVICE — SHEATH INTRODUCER 5FR 10CM

## (undated) DEVICE — NDL HYPO REG 25G X 1 1/2

## (undated) DEVICE — CATH EMERGE MR 20 X 1.50

## (undated) DEVICE — CANNULA NON-VENT 24FR 14.5IN

## (undated) DEVICE — SOL .9NACL PF 100 ML

## (undated) DEVICE — ADAPTER DUAL NSL LUER M-M 7FT

## (undated) DEVICE — HEMOSTAT SURGICEL FIBRLR 2X4IN

## (undated) DEVICE — CATH EMERGE MR 20 X 2.00

## (undated) DEVICE — CARTRIDGE HEPARIN 2 CHNNL ACT

## (undated) DEVICE — SOL NACL IRR 3000ML

## (undated) DEVICE — DRAPE SLUSH WARMER WITH DISC

## (undated) DEVICE — CATH LASER POINT ELCA 9 X 80

## (undated) DEVICE — CATH IMPULSE 5F 100CM FR4

## (undated) DEVICE — SEE MEDLINE ITEM 159606

## (undated) DEVICE — WIRE INTRAMYOCARDIAL TEMP

## (undated) DEVICE — GUIDE LAUNCHER 6FR 100CM IMA

## (undated) DEVICE — CATH IMPULSE PIGTAIL 5FR 110CM

## (undated) DEVICE — CONNECTOR Y STRL 3/8X3/8X3/8IN

## (undated) DEVICE — CANNULA MC2 OVAL NVENT 32/40FR

## (undated) DEVICE — ANGIOTOUCH KIT

## (undated) DEVICE — GLOVE PROTEXIS BLUE LATEX 7

## (undated) DEVICE — CATH IMPULSE 5FR PIGTAIL 125CM

## (undated) DEVICE — PUNCH AORTIC ROT TIP 4.0MM

## (undated) DEVICE — SOL NACL IRR 1000ML BTL

## (undated) DEVICE — SUT VICRYL 1 OB 36 CTX

## (undated) DEVICE — CLIP LIGATING HEMOCLP SMALL

## (undated) DEVICE — APPLIER LIGACLIP SM 9.38IN

## (undated) DEVICE — CARTRIDGE HEPARIN DOSE

## (undated) DEVICE — SUT 6 18IN STEEL MONO CCS

## (undated) DEVICE — CATH EMPULSE ANGLED 5FR PIGTAI

## (undated) DEVICE — CATH THOR STND RGHT ANG 28F

## (undated) DEVICE — GLOVE PROTEXIS PI SYN SURG 7.5

## (undated) DEVICE — SUT 2/0 30IN SILK BLK BRAI

## (undated) DEVICE — CANNULA NASAL ADULT

## (undated) DEVICE — SUT PROLENE 5-0 36IN C-1

## (undated) DEVICE — SUT VICRYL 2-0 36 CT-1

## (undated) DEVICE — SHEATH INTRODUCER 6FR 11CM

## (undated) DEVICE — GUIDEWIRE RUNTHROUGH NS 180CM

## (undated) DEVICE — GUIDEWIRE INQWIRE SE 3MM JTIP

## (undated) DEVICE — SOL LAC RINGERS 1000ML INJ

## (undated) DEVICE — DRAPE STERI INCISE 23X23IN

## (undated) DEVICE — KIT MINI STK MAX COAX 5FR 10CM

## (undated) DEVICE — GUIDE LAUNCHER 6FR EBU 4.0

## (undated) DEVICE — NDL SYR 10ML 18X1.5 LL BLUNT

## (undated) DEVICE — SOL ELECTROLYTE PH 7.4 500ML

## (undated) DEVICE — NDL QUINCKE S/SU 22GA 5IN

## (undated) DEVICE — SOL NORMAL USPCA 0.9%

## (undated) DEVICE — DRAPE UTILITY W/ TAPE 20X30IN

## (undated) DEVICE — SUT MONOCRYL PLUS UD 3-0 27

## (undated) DEVICE — SYR 3ML LL 18GA 1.5IN

## (undated) DEVICE — CABLE PACING ALLGTR CLIP 12FT

## (undated) DEVICE — CATH THORACIC 28FR ST

## (undated) DEVICE — STOPCOCK 4-WAY

## (undated) DEVICE — COVER C-ARM STRAP BAND 44X80IN

## (undated) DEVICE — CARTRIDGE SILV 4CHAN 2.0-3.5MG

## (undated) DEVICE — GLOVE PROTEXIS LTX MICRO  7

## (undated) DEVICE — GLOVE PROTEXIS NEOPRN SZ8

## (undated) DEVICE — SYS VIRTUOSAPH PLUS EVM

## (undated) DEVICE — APPLICATOR ENDOSCP 32CM5MM2TIP

## (undated) DEVICE — CATH EAGLE EYE PLATINUM

## (undated) DEVICE — APPLIER CLIP LIAGCLIP 9.375IN

## (undated) DEVICE — VALVE CONTROL COPILOT

## (undated) DEVICE — CLIP LIGATING MEDIUM

## (undated) DEVICE — SUT 2-0 VICRYL / CT-1

## (undated) DEVICE — CATH PERFORMA 038IN 5FR 100CM

## (undated) DEVICE — DRESSING TELFA + BARR 4X6IN

## (undated) DEVICE — DEVICE INDEFLATOR BASIX

## (undated) DEVICE — CHLORAPREP 10.5 ML APPLICATOR

## (undated) DEVICE — APPLICATOR SURG 2 SPRY 1 PLUNG

## (undated) DEVICE — SHEATH INTRODUCER 5F 10CM

## (undated) DEVICE — NDL HYPO 22GX1 1/2 SYR 10ML LL

## (undated) DEVICE — CANNULA ADULT NASAL 7FT

## (undated) DEVICE — CATH EMERGE MR 15 X 1.50

## (undated) DEVICE — SUT ETHBND XTRA 1 OS-8 30IN

## (undated) DEVICE — GLOVE COTTON KNITTED CUFF

## (undated) DEVICE — SUT 3-0 MONOCRYL PLUS PS-2

## (undated) DEVICE — DRESSING TELFA + RECT 6X10IN

## (undated) DEVICE — TRAY CATH FOL SIL TEMP 10 16FR

## (undated) DEVICE — CATH IMPULSE MP 5FR 145CM

## (undated) DEVICE — DRESSING ANTIMIC ISLAND 4X10IN

## (undated) DEVICE — WATER STRL IRR USP UROMTC 1000